# Patient Record
Sex: FEMALE | Race: WHITE | NOT HISPANIC OR LATINO | Employment: FULL TIME | URBAN - METROPOLITAN AREA
[De-identification: names, ages, dates, MRNs, and addresses within clinical notes are randomized per-mention and may not be internally consistent; named-entity substitution may affect disease eponyms.]

---

## 2018-05-19 ENCOUNTER — HOSPITAL ENCOUNTER (EMERGENCY)
Facility: HOSPITAL | Age: 48
Discharge: HOME | End: 2018-05-19
Attending: EMERGENCY MEDICINE | Admitting: EMERGENCY MEDICINE

## 2018-05-19 ENCOUNTER — APPOINTMENT (OUTPATIENT)
Dept: GENERAL RADIOLOGY | Facility: HOSPITAL | Age: 48
End: 2018-05-19

## 2018-05-19 VITALS
HEIGHT: 61 IN | BODY MASS INDEX: 46.26 KG/M2 | TEMPERATURE: 97.7 F | SYSTOLIC BLOOD PRESSURE: 140 MMHG | HEART RATE: 66 BPM | WEIGHT: 245 LBS | RESPIRATION RATE: 18 BRPM | DIASTOLIC BLOOD PRESSURE: 76 MMHG | OXYGEN SATURATION: 98 %

## 2018-05-19 DIAGNOSIS — S93.602A SPRAIN OF LEFT FOOT, INITIAL ENCOUNTER: Primary | ICD-10-CM

## 2018-05-19 PROCEDURE — 99283 EMERGENCY DEPT VISIT LOW MDM: CPT

## 2018-05-19 PROCEDURE — 73630 X-RAY EXAM OF FOOT: CPT

## 2018-05-19 RX ORDER — IRON POLYSACCH/IRON HEME POLYP 28 MG
28 TABLET ORAL DAILY
COMMUNITY
Start: 2018-01-04 | End: 2019-09-17

## 2018-05-19 RX ORDER — FENOFIBRATE 145 MG/1
145 TABLET, COATED ORAL DAILY
COMMUNITY
Start: 2017-12-23 | End: 2019-09-17

## 2018-05-19 RX ORDER — LANOLIN ALCOHOL/MO/W.PET/CERES
1000 CREAM (GRAM) TOPICAL DAILY
Status: ON HOLD | COMMUNITY
Start: 2018-02-04 | End: 2019-11-15

## 2018-05-19 RX ORDER — CETIRIZINE HYDROCHLORIDE 10 MG/1
10 TABLET ORAL DAILY
COMMUNITY

## 2018-05-19 RX ORDER — OMEPRAZOLE 20 MG/1
20 CAPSULE, DELAYED RELEASE ORAL DAILY
COMMUNITY
End: 2019-06-20 | Stop reason: DRUGHIGH

## 2018-05-19 NOTE — ED NOTES
Pt was in Physical therapy for deconditioning. Pt thinks she has plantar fasciitis, but has not been diagnosed. Pt was in physical therapy yesterday after stretching she turned and heard a pop and felt a snap in her left foot. Now she can barely stand on her left foot and it hurts. Pt states it feels like something is crawling up her left foot and leg.      Rafaela Javier RN  05/19/18 [id number]

## 2018-05-19 NOTE — ED PROVIDER NOTES
"EMERGENCY DEPARTMENT ENCOUNTER    Room Number:  48/48  Date seen:  5/19/2018  Time seen: 11:33 AM  PCP: Jodee Wayne MD  Historian: Patient, Family      HPI:  Chief Complaint: Foot Pain   Context: Armin Viramontes is a 48 y.o. female with h/o chronic left foot pain. Pt reports that she is currently in physical therapy for deconditioning. Pt reports that yesterday, while pt was at physical therapy turning her body around to face her physical therapist, pt felt a \"pop\" in her left foot. Since this incident, pt has had pain of the left foot worsening from baseline. Pt states that her pain worsens with movement of the left foot and improves with resting the left foot. Pt states that she has applied ice and elevated the LLE without significant sx relief. Pt denies chest pain, dyspnea, and left calf pain. There are no other complaints at this time.       Location: Left foot   Radiation: None  Severity: Moderate   Duration: Worsening from baseline since yesterday   Onset quality: Abrupt   Associated Symptoms: Pain with ROM of the left foot   Previous treatment(s): Ice/elevating the LLE        PAST MEDICAL HISTORY  Active Ambulatory Problems     Diagnosis Date Noted   • Seasonal allergies    • Hyperlipidemia    • GERD (gastroesophageal reflux disease)      Resolved Ambulatory Problems     Diagnosis Date Noted   • No Resolved Ambulatory Problems     Past Medical History:   Diagnosis Date   • GERD (gastroesophageal reflux disease)    • Hair loss    • HPV in female 2012   • Hyperlipidemia    • Seasonal allergies        PAST SURGICAL HISTORY  Past Surgical History:   Procedure Laterality Date   • ESSURE TUBAL LIGATION     • TONSILLECTOMY         FAMILY HISTORY  Family History   Problem Relation Age of Onset   • Hypertension Mother    • Hyperlipidemia Mother    • Breast cancer Mother 62        DCIS   • Diabetes Father    • Hyperlipidemia Father    • Heart attack Father    • Stroke Father    • Endometriosis Maternal Grandmother "    • Osteoporosis Maternal Grandfather        SOCIAL HISTORY  Social History     Social History   • Marital status: Single     Spouse name: N/A   • Number of children: N/A   • Years of education: N/A     Occupational History   • Director      Social History Main Topics   • Smoking status: Never Smoker   • Smokeless tobacco: Not on file   • Alcohol use Yes      Comment: soc   • Drug use: No   • Sexual activity: Yes     Partners: Male     Birth control/ protection: Surgical     Other Topics Concern   • Not on file     Social History Narrative   • No narrative on file       ALLERGIES  Erythromycin and Vit c-cholecalciferol-joe hip [cholecalciferol-vitamin c]          REVIEW OF SYSTEMS  Review of Systems   Constitutional: Negative.    Eyes: Negative for visual disturbance.   Respiratory: Negative for shortness of breath.    Cardiovascular: Negative for chest pain.   Gastrointestinal: Negative for abdominal pain, nausea and vomiting.   Musculoskeletal: Negative for neck pain.        Left foot pain   Skin: Negative.    Neurological: Negative for syncope, weakness, numbness and headaches.   Psychiatric/Behavioral: Negative.    All other systems reviewed and are negative.            PHYSICAL EXAM  ED Triage Vitals   Temp Heart Rate Resp BP SpO2   05/19/18 1056 05/19/18 1056 05/19/18 1056 05/19/18 1124 05/19/18 1056   97.7 °F (36.5 °C) 66 18 123/67 98 %      Temp src Heart Rate Source Patient Position BP Location FiO2 (%)   05/19/18 1056 05/19/18 1056 05/19/18 1124 05/19/18 1124 --   Tympanic Monitor Sitting Left arm        Physical Exam   Constitutional: She is oriented to person, place, and time and well-developed, well-nourished, and in no distress.   Eyes: EOM are normal.   Neck: Normal range of motion.   Cardiovascular: Normal rate and regular rhythm.    Pulses:       Dorsalis pedis pulses are 2+ on the right side, and 2+ on the left side.        Posterior tibial pulses are 2+ on the right side, and 2+ on the left  side.   Pulmonary/Chest: Effort normal and breath sounds normal. No respiratory distress.   Musculoskeletal: She exhibits tenderness (of the left heel).   No left calf tenderness present. Left Achilles' tendon is nontender.    Neurological: She is alert and oriented to person, place, and time. She has normal sensation and normal strength.   Skin: Skin is warm and dry.   Psychiatric: Affect normal.   Nursing note and vitals reviewed.          RADIOLOGY  XR Foot 3+ View Left     No significant bone or joint abnormality is seen. There is a  very small plantar spur of the calcaneus.           I ordered the above noted radiological studies. Interpreted by radiologist.           PROCEDURES  Procedures            PROGRESS AND CONSULTS  ED Course as of May 19 1329   Sat May 19, 2018   1237 12:37 PM  Patient with foot pain. Achilles nontender and xrays normal.    [SL]      ED Course User Index  [SL] Cordell Whitley MD     11:50 AM:  Left foot X-Ray ordered for further evaluation.     12:30 PM:  Rechecked pt. Informed pt that her left foot X-Ray is negative acute. Pt will be provided with crutches for ambulatory assistance. Pt is requesting Dr. Omalley, orthopedist, for f/u -> pt will be provided with f/u referral information. RTER warnings given. Pt agrees with plan for discharge.         MEDICAL DECISION MAKING      MDM  Number of Diagnoses or Management Options  Sprain of left foot, initial encounter:      Amount and/or Complexity of Data Reviewed  Tests in the radiology section of CPT®: ordered and reviewed (Left foot X-Ray:  No significant bone or joint abnormality is seen. There is a  very small plantar spur of the calcaneus.)    Patient Progress  Patient progress: stable                 DIAGNOSIS  Final diagnoses:   Sprain of left foot, initial encounter               DISPOSITION  Pt discharged.      DISCHARGE    Patient discharged in stable condition.    Reviewed implications of results, diagnosis, meds,  responsibility to follow up, warning signs and symptoms of possible worsening, potential complications and reasons to return to ER.    Patient/Family voiced understanding of above instructions.    Discussed plan for discharge, as there is no emergent indication for admission. Pt/family is agreeable and understands need for follow up and repeat testing. Pt is aware that discharge does not mean that nothing is wrong but it indicates no emergency is present that requires admission and they must continue care with follow-up as given below or physician of their choice.     FOLLOW-UP  Yvonne Omalley MD  [address] [facility] [address]  [place] [id number]  [phone]    Schedule an appointment as soon as possible for a visit in 2 days      Raz Shore MD  [address] [facility] [address]  [facility] [address]  [phone]    Schedule an appointment as soon as possible for a visit in 2 days                  Latest Documented Vital Signs:  As of 1:08 PM  BP- 140/76 HR- 66 Temp- 97.7 °F (36.5 °C) (Tympanic) O2 sat- 98%      --  Documentation assistance provided by ladonna Goldberg for Dr. Angi MD.  Information recorded by the ladonna was done at my direction and has been verified and validated by me.              Kay Goldberg  05/19/18 5204       Cordell Whitley MD  05/19/18 2830

## 2018-11-12 ENCOUNTER — TELEPHONE (OUTPATIENT)
Dept: OBSTETRICS AND GYNECOLOGY | Age: 48
End: 2018-11-12

## 2018-11-12 RX ORDER — FLUCONAZOLE 150 MG/1
150 TABLET ORAL ONCE
Qty: 1 TABLET | Refills: 0 | Status: SHIPPED | OUTPATIENT
Start: 2018-11-12 | End: 2018-11-12

## 2018-11-12 NOTE — TELEPHONE ENCOUNTER
Dr Maldonado pt has tried otc monistat for yeast and is not helping, still exp itch, irritation. Could we send in a Rx to pharm on file, pt states nka. Please advise

## 2019-01-25 ENCOUNTER — PROCEDURE VISIT (OUTPATIENT)
Dept: OBSTETRICS AND GYNECOLOGY | Age: 49
End: 2019-01-25

## 2019-01-25 ENCOUNTER — APPOINTMENT (OUTPATIENT)
Dept: WOMENS IMAGING | Facility: HOSPITAL | Age: 49
End: 2019-01-25

## 2019-01-25 ENCOUNTER — OFFICE VISIT (OUTPATIENT)
Dept: OBSTETRICS AND GYNECOLOGY | Age: 49
End: 2019-01-25

## 2019-01-25 VITALS
SYSTOLIC BLOOD PRESSURE: 124 MMHG | WEIGHT: 256 LBS | HEIGHT: 61 IN | BODY MASS INDEX: 48.33 KG/M2 | DIASTOLIC BLOOD PRESSURE: 76 MMHG

## 2019-01-25 DIAGNOSIS — Z01.419 ENCOUNTER FOR GYNECOLOGICAL EXAMINATION WITHOUT ABNORMAL FINDING: ICD-10-CM

## 2019-01-25 DIAGNOSIS — Z12.4 ROUTINE CERVICAL SMEAR: ICD-10-CM

## 2019-01-25 DIAGNOSIS — Z12.31 VISIT FOR SCREENING MAMMOGRAM: Primary | ICD-10-CM

## 2019-01-25 DIAGNOSIS — Z11.51 SPECIAL SCREENING EXAMINATION FOR HUMAN PAPILLOMAVIRUS (HPV): ICD-10-CM

## 2019-01-25 PROCEDURE — 77067 SCR MAMMO BI INCL CAD: CPT | Performed by: OBSTETRICS & GYNECOLOGY

## 2019-01-25 PROCEDURE — 77067 SCR MAMMO BI INCL CAD: CPT | Performed by: RADIOLOGY

## 2019-01-25 PROCEDURE — 99396 PREV VISIT EST AGE 40-64: CPT | Performed by: OBSTETRICS & GYNECOLOGY

## 2019-01-25 NOTE — PROGRESS NOTES
Subjective     Chief Complaint   Patient presents with   • Gynecologic Exam     AC       History of Present Illness    Armin Viramontes is a 48 y.o.  who presents for annual exam.  The patient has not been here for a few years.  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    She has had a difficult time with trying to exercise.  She has plantar fasciitis and has tried physical therapy.  She also had a workup for shortness of breath with just talking.  Cardiac and pulmonary workup for negative.  She is interested in possible bariatric treatment since her weight prevents her from being able to exercise comfortably and exacerbates the plantar fasciitis    Her menses are regular every 28-30 days, lasting 3 days, dysmenorrhea mild, occurring first 1-2 days of flow   Obstetric History:  OB History      Para Term  AB Living    0 0 0 0 0 0    SAB TAB Ectopic Molar Multiple Live Births    0 0 0   0           Menstrual History:     Patient's last menstrual period was 2019.         Current contraception: essure  History of abnormal Pap smear: yes - HPV  Received Gardasil immunization: no  Perform regular self breast exam: no  Family history of uterine or ovarian cancer: yes - MGM cervical? 50 pt  to ask her mother.  Family History of colon cancer: no  Family history of breast cancer: yes - MOM 61    Mammogram: done today.  Colonoscopy: recommended.  DEXA: not indicated.    Exercise: not active  Calcium/Vitamin D: inadequate intake    The following portions of the patient's history were reviewed and updated as appropriate: allergies, current medications, past family history, past medical history, past social history, past surgical history and problem list.    Review of Systems    Review of Systems   Constitutional: Negative for fatigue.   Respiratory: Negative for shortness of breath.    Gastrointestinal: Negative for abdominal pain.   Genitourinary: Negative for dysuria.   Neurological: Negative for  "headaches.   Psychiatric/Behavioral: Negative for dysphoric mood.         Objective   Physical Exam    /76   Ht 154.9 cm (61\")   Wt 116 kg (256 lb)   LMP 01/07/2019   BMI 48.37 kg/m²     General:   alert, appears stated age and cooperative   Neck: no adenopathy and thyroid normal to palpation   Heart: regular rate and rhythm   Lungs: clear to auscultation bilaterally   Abdomen: soft, non-tender, without masses or organomegaly   Breast: inspection negative, no nipple discharge or bleeding, no masses or nodularity palpable   Vulva: normal, Bartholin's, Urethra, [place]'s normal   Vagina: normal mucosa, normal discharge   Cervix: no cervical motion tenderness and no lesions   Uterus: mobile, non-tender, normal shape and consistency   Adnexa: no mass, fullness, tenderness   Rectal: normal rectal, no masses     Assessment/Plan   Armin was seen today for gynecologic exam.    Diagnoses and all orders for this visit:    BMI 45.0-49.9, adult (CMS/[facility])  -     Ambulatory Referral to Bariatric Surgery    Encounter for gynecological examination without abnormal finding  -     Ambulatory Referral For Screening Colonoscopy  -     IGP, Aptima HPV, Rfx 16 / 18,45    Routine cervical smear  -     IGP, Aptima HPV, Rfx 16 / 18,45    Special screening examination for human papillomavirus (HPV)  -     IGP, Aptima HPV, Rfx 16 / 18,45      I think referral to bariatric surgery is a good idea for significant weight loss.    I will also refer the patient to Dr. Perez.  She has had some issues with diarrhea and will need to scroll screening colonoscopy since she is over the age of 47.  Pap smear was sent today.  Patient has a history of HPV.  Patient will check with her mother as to what type of GYN cancer runs in the family.  We discussed if it is ovarian cancer she would be candidate for genetic testing.  All questions answered.  Breast self exam technique reviewed and patient encouraged to perform self-exam monthly.  Discussed " healthy lifestyle modifications.  Recommended 30 minutes of aerobic exercise five times per week.  Discussed calcium needs to prevent osteoporosis.

## 2019-01-29 ENCOUNTER — TELEPHONE (OUTPATIENT)
Dept: OBSTETRICS AND GYNECOLOGY | Age: 49
End: 2019-01-29

## 2019-01-29 DIAGNOSIS — R92.2 INCONCLUSIVE MAMMOGRAM: Primary | ICD-10-CM

## 2019-01-29 PROBLEM — Z80.41 FAMILY HISTORY OF OVARIAN CANCER: Status: ACTIVE | Noted: 2019-01-29

## 2019-01-29 LAB
CYTOLOGIST CVX/VAG CYTO: NORMAL
CYTOLOGY CVX/VAG DOC THIN PREP: NORMAL
DX ICD CODE: NORMAL
HIV 1 & 2 AB SER-IMP: NORMAL
HPV I/H RISK 4 DNA CVX QL PROBE+SIG AMP: NEGATIVE
OTHER STN SPEC: NORMAL
PATH REPORT.FINAL DX SPEC: NORMAL
STAT OF ADQ CVX/VAG CYTO-IMP: NORMAL

## 2019-02-05 ENCOUNTER — DOCUMENTATION (OUTPATIENT)
Dept: OBSTETRICS AND GYNECOLOGY | Age: 49
End: 2019-02-05

## 2019-02-05 ENCOUNTER — APPOINTMENT (OUTPATIENT)
Dept: WOMENS IMAGING | Facility: HOSPITAL | Age: 49
End: 2019-02-05

## 2019-02-05 PROCEDURE — 76641 ULTRASOUND BREAST COMPLETE: CPT | Performed by: RADIOLOGY

## 2019-02-08 DIAGNOSIS — R59.9 LYMPH NODE ENLARGEMENT: Primary | ICD-10-CM

## 2019-02-11 ENCOUNTER — APPOINTMENT (OUTPATIENT)
Dept: WOMENS IMAGING | Facility: HOSPITAL | Age: 49
End: 2019-02-11

## 2019-02-11 PROCEDURE — 76942 ECHO GUIDE FOR BIOPSY: CPT | Performed by: RADIOLOGY

## 2019-02-11 PROCEDURE — 38505 NEEDLE BIOPSY LYMPH NODES: CPT | Performed by: RADIOLOGY

## 2019-02-14 DIAGNOSIS — R59.0 LYMPHADENOPATHY, AXILLARY: Primary | ICD-10-CM

## 2019-02-19 ENCOUNTER — TELEPHONE (OUTPATIENT)
Dept: OBSTETRICS AND GYNECOLOGY | Age: 49
End: 2019-02-19

## 2019-02-19 NOTE — TELEPHONE ENCOUNTER
----- Message from Curtis Rai MD sent at 2/19/2019  7:44 AM EST -----  Please make sure the patient knows to schedule a 3 and 6-month recheck [facility]  I have also ordered an oncology consult.  The office should be calling her to set that up.

## 2019-02-20 ENCOUNTER — TELEPHONE (OUTPATIENT)
Dept: OBSTETRICS AND GYNECOLOGY | Age: 49
End: 2019-02-20

## 2019-02-27 ENCOUNTER — TELEPHONE (OUTPATIENT)
Dept: OBSTETRICS AND GYNECOLOGY | Age: 49
End: 2019-02-27

## 2019-02-28 ENCOUNTER — APPOINTMENT (OUTPATIENT)
Dept: LAB | Facility: HOSPITAL | Age: 49
End: 2019-02-28

## 2019-02-28 ENCOUNTER — APPOINTMENT (OUTPATIENT)
Dept: ONCOLOGY | Facility: CLINIC | Age: 49
End: 2019-02-28

## 2019-03-18 RX ORDER — FLUCONAZOLE 150 MG/1
150 TABLET ORAL DAILY
Qty: 1 TABLET | Refills: 0 | Status: SHIPPED | OUTPATIENT
Start: 2019-03-18 | End: 2019-03-22 | Stop reason: SDUPTHER

## 2019-03-18 NOTE — TELEPHONE ENCOUNTER
Dr Rai pt requesting a prescription for yeast be sent to her verified pharmacy on file as pt just finished an antibiotic for both a toe fungus & a upper respiratory infection. Pt reports vaginal itching, burning & irritation.

## 2019-03-22 RX ORDER — FLUCONAZOLE 150 MG/1
150 TABLET ORAL DAILY
Qty: 1 TABLET | Refills: 0 | Status: SHIPPED | OUTPATIENT
Start: 2019-03-22 | End: 2019-10-31

## 2019-03-22 NOTE — TELEPHONE ENCOUNTER
Dr Maldonado pt was given 1 Diflucan 2-3 days ago and had not noticed much improvement, still exp itch, irritation but has been on abx. Pt requesting we send in 1 more Diflucan to her pharm on file, allergic to erythromycin. Please advise

## 2019-05-16 ENCOUNTER — TELEPHONE (OUTPATIENT)
Dept: OBSTETRICS AND GYNECOLOGY | Age: 49
End: 2019-05-16

## 2019-05-16 NOTE — TELEPHONE ENCOUNTER
Dr Rai pt needs a second letter stating that she tried to lose weight on her own. Pt states the Bariatric center needs two letters. Please fax to the [facility], [facility] [phone]. Please Advise

## 2019-05-16 NOTE — TELEPHONE ENCOUNTER
Please produce letter that states the patient has been trying to lose weight on her own and I will sign.

## 2019-05-29 ENCOUNTER — TELEPHONE (OUTPATIENT)
Dept: OBSTETRICS AND GYNECOLOGY | Age: 49
End: 2019-05-29

## 2019-06-07 ENCOUNTER — APPOINTMENT (OUTPATIENT)
Dept: ONCOLOGY | Facility: CLINIC | Age: 49
End: 2019-06-07

## 2019-06-10 ENCOUNTER — TELEPHONE (OUTPATIENT)
Dept: OBSTETRICS AND GYNECOLOGY | Age: 49
End: 2019-06-10

## 2019-06-10 NOTE — TELEPHONE ENCOUNTER
Dr Rai pt states Bariatric surgery needs another note on file. Pt states 01/25/2019 is the first note. Pt just needs another note stating that Dr Rai and pt talked about her weight, that's not from 01/2019. Please Advise

## 2019-06-18 ENCOUNTER — APPOINTMENT (OUTPATIENT)
Dept: WOMENS IMAGING | Facility: HOSPITAL | Age: 49
End: 2019-06-18

## 2019-06-18 PROCEDURE — 76641 ULTRASOUND BREAST COMPLETE: CPT | Performed by: RADIOLOGY

## 2019-06-19 NOTE — PROGRESS NOTES
Subjective     REASON FOR CONSULTATION: Lymphadenopathy, anemia  Provide an opinion on any further workup or treatment                             REQUESTING PHYSICIAN: Jodee Wayne MD    RECORDS OBTAINED:  Records of the patients history including those obtained from the referring provider were reviewed and summarized in detail.    HISTORY OF PRESENT ILLNESS:  The patient is a 49 y.o. year old female who is here for an opinion about the above issue.    History of Present Illness ;  The patient is a 49-year-old female followed with a history of hyperlipidemia, GE reflux and seasonal allergies.  She also follows with her gynecologist routinely and been seen January 25 with concerns about her weight and a referral to bariatric surgery was pursued as well as screening colonoscopy.  She has a history of HPV and evidently a family history of ovarian malignancy.    The patient had undergone routine mammography January 28 revealing scattered areas of fibroglandular density and new large action lymph nodes in both axillae right greater than left.  Further ultrasound was performed February 7 demonstrating abnormal axillary lymph nodes correlating with mammographic findings both axillae.  Biopsy was performed February 18, 2019- Pathology reveals benign lymphoid tissue.  Flow cytometry also revealed no significant lymphoid immunophenotypic abnormalities with a histologic features and findings of flow cytometry and immunohistochemistry consistent with benign tissue.Additional testing in February include H&H of 10.4 and 33.5 with white count 13,800, platelet count 3 74,000 with a normal differential.    We are asked to see the patient for her anemia as well as her lymphadenopathy.  She provides a history that includes a number of symptoms including night sweats, excessive fatigue, periodic rash, shortness of breath with modest activity, periodic ankle swelling, frequency of urination and a general reduction of stamina when  usually she is quite active and energetic.    Past Medical History:   Diagnosis Date   • Anemia    • GERD (gastroesophageal reflux disease)    • Hair loss    • History of obesity    • HPV in female 2012   • Hyperlipidemia    • Lymphadenopathy 02/2019    Right axilla   • JAZMYN (obstructive sleep apnea)    • Patient exposure to body fluids    • Seasonal allergies         Past Surgical History:   Procedure Laterality Date   • ESSURE TUBAL LIGATION     • TONSILLECTOMY          Current Outpatient Medications on File Prior to Visit   Medication Sig Dispense Refill   • Biotin 10 MG tablet Take  by mouth.     • CBD (cannabidiol) oral oil Take  by mouth.     • cetirizine (zyrTEC) 10 MG tablet Take 10 mg by mouth Daily.     • Cholecalciferol (VITAMIN D3) 5000 units capsule capsule Take 5,000 Units by mouth Daily.     • fenofibrate (TRICOR) 145 MG tablet Take 145 mg by mouth Daily.     • fluconazole (DIFLUCAN) 150 MG tablet Take 1 tablet by mouth Daily. 1 tablet 0   • Multiple Vitamins-Minerals (MULTIVITAMIN ADULT PO) Take  by mouth.     • omeprazole (priLOSEC) 40 MG capsule      • Polysacch Fe Cmp-Fe Heme Poly (FEOSOL BIFERA) 28 MG tablet Take 28 mg by mouth Daily.     • Probiotic Product (PROBIOTIC-10 PO) Take  by mouth.     • terbinafine (lamiSIL) 250 MG tablet Take 250 mg by mouth Daily.     • TURMERIC PO Take  by mouth.     • vitamin B-12 (CYANOCOBALAMIN) 1000 MCG tablet Take 1,000 mcg by mouth Daily.     • [DISCONTINUED] omeprazole (priLOSEC) 20 MG capsule Take 20 mg by mouth Daily.       No current facility-administered medications on file prior to visit.         ALLERGIES:    Allergies   Allergen Reactions   • Erythromycin    • Vit C-Cholecalciferol-Rochelle Hip [Cholecalciferol-Vitamin C]         Social History     Socioeconomic History   • Marital status: Single     Spouse name: Not on file   • Number of children: Not on file   • Years of education: Not on file   • Highest education level: Not on file   Occupational  "History   • Occupation: Director     Employer: [employer]   Tobacco Use   • Smoking status: Never Smoker   • Smokeless tobacco: Never Used   Substance and Sexual Activity   • Alcohol use: Yes     Frequency: Monthly or less     Comment: social   • Drug use: No   • Sexual activity: Not Currently     Partners: Male     Birth control/protection: Surgical        Family History   Problem Relation Age of Onset   • Hypertension Mother    • Hyperlipidemia Mother    • Breast cancer Mother 62        DCIS   • Thyroid disease Mother    • Diabetes Father    • Hyperlipidemia Father    • Heart attack Father    • Stroke Father    • Heart disease Father         CHF   • Obesity Father    • Endometriosis Maternal Grandmother    • Ovarian cancer Maternal Grandmother 49        gyn ? ovarian   • Osteoporosis Maternal Grandfather         Review of Systems   Constitutional: Positive for activity change, chills, diaphoresis, fatigue and unexpected weight change.   Respiratory: Positive for chest tightness and shortness of breath.    Cardiovascular: Positive for palpitations and leg swelling.   Musculoskeletal: Positive for arthralgias.   Neurological: Positive for dizziness.        Objective     Vitals:    06/20/19 1320   BP: 164/92   Pulse: 69   Resp: 18   Temp: 97.6 °F (36.4 °C)   TempSrc: Oral   SpO2: 99%   Weight: 120 kg (263 lb 9.6 oz)   Height: 154.9 cm (60.98\")  Comment: new patient height   PainSc: 0-No pain     Current Status 6/20/2019   ECOG score 0       Physical Exam   Constitutional: She is oriented to person, place, and time. She appears well-developed and well-nourished.   Morbidly obese  female   HENT:   Head: Normocephalic and atraumatic.   Nose: Nose normal.   Eyes: EOM are normal. Pupils are equal, round, and reactive to light.   Neck: Normal range of motion. Neck supple.   No lymphadenopathy noted in cervical, infraclavicular, supraclavicular, axillary or groin regions.   Cardiovascular: Normal rate, regular " rhythm, normal heart sounds and intact distal pulses.   Pulmonary/Chest: Effort normal and breath sounds normal.   Abdominal: Soft. Bowel sounds are normal.   Musculoskeletal: Normal range of motion.   Lymphadenopathy:     She has no cervical adenopathy.   Neurological: She is alert and oriented to person, place, and time.   Skin: Skin is warm and dry.   Psychiatric: She has a normal mood and affect. Her behavior is normal. Thought content normal.         RECENT LABS:  Hematology WBC   Date Value Ref Range Status   06/20/2019 12.24 (H) 3.40 - 10.80 10*3/mm3 Final     RBC   Date Value Ref Range Status   06/20/2019 4.56 3.77 - 5.28 10*6/mm3 Final     Hemoglobin   Date Value Ref Range Status   06/20/2019 11.2 (L) 12.0 - 15.9 g/dL Final     Hematocrit   Date Value Ref Range Status   06/20/2019 35.6 34.0 - 46.6 % Final     Platelets   Date Value Ref Range Status   06/20/2019 187 140 - 450 10*3/mm3 Final          Assessment/Plan        49-year-old female with a history of hyperlipidemia, GE reflux, seasonal allergies and morbid obesity recently accessing potential bariatric surgery.  She had undergone routine mammography in late January showing new large lymphadenopathy in both axillae, confirmed on ultrasound and eventual needle biopsy February 18, 2019 showing benign lymphoid tissue.  Approximately at the same point the patient was developing significant anemia with February 18 assessment including H&H of 10.4 and 33.5 MCV not 77.2, MCH of 24.0, normal platelet count normal platelet count, and mild leukocytosis with normal differential.  The patient states that she was placed on oral iron but does not take it routinely and, indeed, her hemoglobin hematocrit are mildly improved.  Her symptoms however described as above are significant with excessive fatigue upon minimal activity, night sweats, temperature intolerance and shortness of breath again with minimal activity.     We have discussed an assessment to include not  only her degree of anemia but also asked whether it is potentially associated with further lymphadenopathy at this point.  Finally she also has symptoms that could also include perimenopause.  As a result plan additional laboratory studies with uric acid, sed rate, LDH, LH, FSH, estradiol, MICHELLE, PE and free light chains, iron profile, ferritin, flow cytometry peripheral blood and CT of chest abdomen pelvis.  The studies to be done next several weeks the patient seen back approximately 3 to 4 weeks from now reviewing her status once again.  She may need a node removal rather than a repeat needle biopsy considering diagnostic yield.  She is aware of this is a possibility.  Thanks for allowing us to see this patient consultation please let me know if any question concerning this case.

## 2019-06-20 ENCOUNTER — CONSULT (OUTPATIENT)
Dept: ONCOLOGY | Facility: CLINIC | Age: 49
End: 2019-06-20

## 2019-06-20 ENCOUNTER — TELEPHONE (OUTPATIENT)
Dept: ONCOLOGY | Facility: HOSPITAL | Age: 49
End: 2019-06-20

## 2019-06-20 ENCOUNTER — LAB (OUTPATIENT)
Dept: LAB | Facility: HOSPITAL | Age: 49
End: 2019-06-20

## 2019-06-20 VITALS
RESPIRATION RATE: 18 BRPM | HEIGHT: 61 IN | BODY MASS INDEX: 49.77 KG/M2 | WEIGHT: 263.6 LBS | TEMPERATURE: 97.6 F | HEART RATE: 69 BPM | OXYGEN SATURATION: 99 % | SYSTOLIC BLOOD PRESSURE: 164 MMHG | DIASTOLIC BLOOD PRESSURE: 92 MMHG

## 2019-06-20 DIAGNOSIS — R59.1 LYMPHADENOPATHY: Primary | ICD-10-CM

## 2019-06-20 DIAGNOSIS — D64.9 ANEMIA, UNSPECIFIED TYPE: ICD-10-CM

## 2019-06-20 DIAGNOSIS — R59.1 LYMPHADENOPATHY: ICD-10-CM

## 2019-06-20 DIAGNOSIS — R59.0 AXILLARY LYMPHADENOPATHY: Primary | ICD-10-CM

## 2019-06-20 LAB
BASOPHILS # BLD AUTO: 0.08 10*3/MM3 (ref 0–0.2)
BASOPHILS NFR BLD AUTO: 0.7 % (ref 0–1.5)
DEPRECATED RDW RBC AUTO: 45.3 FL (ref 37–54)
EOSINOPHIL # BLD AUTO: 0.61 10*3/MM3 (ref 0–0.4)
EOSINOPHIL NFR BLD AUTO: 5 % (ref 0.3–6.2)
ERYTHROCYTE [DISTWIDTH] IN BLOOD BY AUTOMATED COUNT: 15.9 % (ref 12.3–15.4)
ERYTHROCYTE [SEDIMENTATION RATE] IN BLOOD: 45 MM/HR (ref 0–20)
ESTRADIOL SERPL HS-MCNC: 41.2 PG/ML
FERRITIN SERPL-MCNC: 40.9 NG/ML (ref 11–207)
FSH SERPL-ACNC: 12.4 MIU/ML
HCT VFR BLD AUTO: 35.6 % (ref 34–46.6)
HGB BLD-MCNC: 11.2 G/DL (ref 12–15.9)
IMM GRANULOCYTES # BLD AUTO: 0.14 10*3/MM3 (ref 0–0.05)
IMM GRANULOCYTES NFR BLD AUTO: 1.1 % (ref 0–0.5)
IRON 24H UR-MRATE: 27 MCG/DL (ref 37–145)
IRON SATN MFR SERPL: 8 % (ref 14–48)
LDH SERPL-CCNC: 180 U/L (ref 99–259)
LH SERPL-ACNC: 9.27 MIU/ML
LYMPHOCYTES # BLD AUTO: 3.61 10*3/MM3 (ref 0.7–3.1)
LYMPHOCYTES NFR BLD AUTO: 29.5 % (ref 19.6–45.3)
MCH RBC QN AUTO: 24.6 PG (ref 26.6–33)
MCHC RBC AUTO-ENTMCNC: 31.5 G/DL (ref 31.5–35.7)
MCV RBC AUTO: 78.1 FL (ref 79–97)
MONOCYTES # BLD AUTO: 0.52 10*3/MM3 (ref 0.1–0.9)
MONOCYTES NFR BLD AUTO: 4.2 % (ref 5–12)
NEUTROPHILS # BLD AUTO: 7.28 10*3/MM3 (ref 1.7–7)
NEUTROPHILS NFR BLD AUTO: 59.5 % (ref 42.7–76)
NRBC BLD AUTO-RTO: 0.2 /100 WBC (ref 0–0.2)
PLATELET # BLD AUTO: 187 10*3/MM3 (ref 140–450)
PMV BLD AUTO: 10.4 FL (ref 6–12)
RBC # BLD AUTO: 4.56 10*6/MM3 (ref 3.77–5.28)
TIBC SERPL-MCNC: 340 MCG/DL (ref 249–505)
TRANSFERRIN SERPL-MCNC: 243 MG/DL (ref 200–360)
URATE SERPL-MCNC: 6.5 MG/DL (ref 2.8–7.4)
WBC NRBC COR # BLD: 12.24 10*3/MM3 (ref 3.4–10.8)

## 2019-06-20 PROCEDURE — 85025 COMPLETE CBC W/AUTO DIFF WBC: CPT | Performed by: INTERNAL MEDICINE

## 2019-06-20 PROCEDURE — 99244 OFF/OP CNSLTJ NEW/EST MOD 40: CPT | Performed by: INTERNAL MEDICINE

## 2019-06-20 PROCEDURE — 83615 LACTATE (LD) (LDH) ENZYME: CPT | Performed by: INTERNAL MEDICINE

## 2019-06-20 PROCEDURE — 36415 COLL VENOUS BLD VENIPUNCTURE: CPT | Performed by: INTERNAL MEDICINE

## 2019-06-20 PROCEDURE — 83001 ASSAY OF GONADOTROPIN (FSH): CPT | Performed by: INTERNAL MEDICINE

## 2019-06-20 PROCEDURE — 83540 ASSAY OF IRON: CPT | Performed by: INTERNAL MEDICINE

## 2019-06-20 PROCEDURE — 82728 ASSAY OF FERRITIN: CPT | Performed by: INTERNAL MEDICINE

## 2019-06-20 PROCEDURE — 83002 ASSAY OF GONADOTROPIN (LH): CPT | Performed by: INTERNAL MEDICINE

## 2019-06-20 PROCEDURE — 84550 ASSAY OF BLOOD/URIC ACID: CPT | Performed by: INTERNAL MEDICINE

## 2019-06-20 PROCEDURE — 84466 ASSAY OF TRANSFERRIN: CPT | Performed by: INTERNAL MEDICINE

## 2019-06-20 PROCEDURE — 85652 RBC SED RATE AUTOMATED: CPT | Performed by: INTERNAL MEDICINE

## 2019-06-20 PROCEDURE — 82670 ASSAY OF TOTAL ESTRADIOL: CPT | Performed by: INTERNAL MEDICINE

## 2019-06-20 RX ORDER — TERBINAFINE HYDROCHLORIDE 250 MG/1
250 TABLET ORAL DAILY
COMMUNITY
End: 2019-07-11

## 2019-06-20 RX ORDER — OMEPRAZOLE 40 MG/1
40 CAPSULE, DELAYED RELEASE ORAL DAILY
COMMUNITY
Start: 2019-04-14 | End: 2020-09-24 | Stop reason: SDUPTHER

## 2019-06-20 NOTE — TELEPHONE ENCOUNTER
----- Message from Sahara Bowman sent at 6/20/2019  3:56 PM EDT -----  [phone]   Has another question for Dr. Perera she forgot to ask him about the pulmonologist appt. She already has.    Attempted to call pt. No answer. Left     Called and spoke with pt. She states she meant to ask Dr. Perera if she should f/u with pulm, or wait until testing comes back that he ordered. Send Trever a message.

## 2019-06-21 LAB
ALBUMIN SERPL-MCNC: 3.4 G/DL (ref 2.9–4.4)
ALBUMIN/GLOB SERPL: 1 {RATIO} (ref 0.7–1.7)
ALPHA1 GLOB FLD ELPH-MCNC: 0.3 G/DL (ref 0–0.4)
ALPHA2 GLOB SERPL ELPH-MCNC: 1.1 G/DL (ref 0.4–1)
B-GLOBULIN SERPL ELPH-MCNC: 1.3 G/DL (ref 0.7–1.3)
GAMMA GLOB SERPL ELPH-MCNC: 1 G/DL (ref 0.4–1.8)
GLOBULIN SER CALC-MCNC: 3.6 G/DL (ref 2.2–3.9)
IGA SERPL-MCNC: 168 MG/DL (ref 87–352)
IGG SERPL-MCNC: 932 MG/DL (ref 700–1600)
IGM SERPL-MCNC: 70 MG/DL (ref 26–217)
INTERPRETATION SERPL IEP-IMP: ABNORMAL
KAPPA LC SERPL-MCNC: 15.9 MG/L (ref 3.3–19.4)
KAPPA LC/LAMBDA SER: 1.14 {RATIO} (ref 0.26–1.65)
LAMBDA LC FREE SERPL-MCNC: 14 MG/L (ref 5.7–26.3)
Lab: ABNORMAL
M-SPIKE: ABNORMAL G/DL
PROT SERPL-MCNC: 7 G/DL (ref 6–8.5)
REF LAB TEST METHOD: NORMAL

## 2019-07-01 ENCOUNTER — TELEPHONE (OUTPATIENT)
Dept: ONCOLOGY | Facility: HOSPITAL | Age: 49
End: 2019-07-01

## 2019-07-01 NOTE — TELEPHONE ENCOUNTER
This message received after her pulm appt.     ----- Message from Cristina Pollock RN sent at 7/1/2019  7:58 AM EDT -----      ----- Message -----  From: Raz Perera MD  Sent: 6/23/2019   9:26 PM  To: Cristina Pollock RN    She should proceed with the appointment with the pulmonologist.ThanksYOLANDA  ----- Message -----  From: Cristina Pollock RN  Sent: 6/20/2019   4:13 PM  To: Raz Perera MD    Pt forgot to ask you, she has an appt with pulmonologist on 6/25, should she continue with that appt or wait until she has more answers from the testing you are doing?    Thanks Cristina

## 2019-07-03 ENCOUNTER — TELEPHONE (OUTPATIENT)
Dept: OBSTETRICS AND GYNECOLOGY | Age: 49
End: 2019-07-03

## 2019-07-03 NOTE — TELEPHONE ENCOUNTER
----- Message from Curtis Rai MD sent at 7/3/2019  9:45 AM EDT -----  Please notify that bilateral breast ultrasound looks good.  The lymph nodes are stable in size.  There are more prominent lymph nodes on the right.  Recommendation is for mammogram in 6 months with bilateral repeat ultrasound of the breast and axilla.

## 2019-07-05 ENCOUNTER — HOSPITAL ENCOUNTER (OUTPATIENT)
Dept: PET IMAGING | Facility: HOSPITAL | Age: 49
Discharge: HOME | End: 2019-07-05
Admitting: INTERNAL MEDICINE

## 2019-07-05 DIAGNOSIS — R59.1 LYMPHADENOPATHY: ICD-10-CM

## 2019-07-05 LAB — CREAT BLDA-MCNC: 0.7 MG/DL (ref 0.6–1.3)

## 2019-07-05 PROCEDURE — 0 DIATRIZOATE MEGLUMINE & SODIUM PER 1 ML: Performed by: INTERNAL MEDICINE

## 2019-07-05 PROCEDURE — 71260 CT THORAX DX C+: CPT

## 2019-07-05 PROCEDURE — 25010000002 IOPAMIDOL 61 % SOLUTION: Performed by: INTERNAL MEDICINE

## 2019-07-05 PROCEDURE — 82565 ASSAY OF CREATININE: CPT

## 2019-07-05 PROCEDURE — 74177 CT ABD & PELVIS W/CONTRAST: CPT

## 2019-07-05 RX ADMIN — IOPAMIDOL 85 ML: 612 INJECTION, SOLUTION INTRAVENOUS at 14:33

## 2019-07-05 RX ADMIN — DIATRIZOATE MEGLUMINE AND DIATRIZOATE SODIUM 30 ML: 660; 100 LIQUID ORAL; RECTAL at 13:45

## 2019-07-05 NOTE — PROGRESS NOTES
Subjective No change physically though the patient indicates she is undergoing bariatric assessment for possible gastric surgery, also describes recent follow-up with women's [facility] and unchanged actually adenopathy on ultrasound.    REASON FOR CONSULTATION: Lymphadenopathy, anemia          REQUESTING PHYSICIAN: Raz Floyd MD, Curtis Rai MD    History of Present Illness ;  The patient is a 49-year-old female followed with a history of hyperlipidemia, GE reflux and seasonal allergies.  She also follows with her gynecologist routinely and been seen January 25 with concerns about her weight and a referral to bariatric surgery was pursued as well as screening colonoscopy.  She has a history of HPV and evidently a family history of ovarian malignancy.    The patient had undergone routine mammography January 28 revealing scattered areas of fibroglandular density and new large action lymph nodes in both axillae right greater than left.  Further ultrasound was performed February 7 demonstrating abnormal axillary lymph nodes correlating with mammographic findings both axillae.  Biopsy was performed February 18, 2019- Pathology reveals benign lymphoid tissue.  Flow cytometry also revealed no significant lymphoid immunophenotypic abnormalities with a histologic features and findings of flow cytometry and immunohistochemistry consistent with benign tissue.Additional testing in February include H&H of 10.4 and 33.5 with white count 13,800, platelet count 3 74,000 with a normal differential.    We are asked to see the patient for her anemia as well as her lymphadenopathy.  She provides a history that includes a number of symptoms including night sweats, excessive fatigue, periodic rash, shortness of breath with modest activity, periodic ankle swelling, frequency of urination and a general reduction of stamina when usually she is quite active and energetic.     The patient subsequent studies included a normal ferritin  though evidence of reduced iron saturation, normal LDH, electrophoresis without evidence of monoclonality, normal uric acid, mildly elevated sed rate to 45, FSH of 12.4, LH of 9.27,Estradiol of 41.2, normal LDH, negative evidence for flow cytometry for monoclonal population and CT studies of the chest and abdomen revealing shotty axillary nodes as well as to additionally enlarged right axillary nodes and a borderline enlarged left axillary node.  The largest left axillary is 2.0 x 0.9.  There is no mediastinal or hilar adenopathy or abdominal findings.  As the patient's reexamined and interviewed July 11 she feels about the same still with a degree of fatigue.  She had been reevaluated by women's [facility] with repeat axillary ultrasound showing no change for the findings and we have discussed the above findings which again support bilateral axillary adenopathy but no other findings.  She is additionally iron deficient and intolerant to oral iron with nausea and constipation.  She indicates she is actually been iron deficient for many years attempting oral iron in her teens.  We have also discussed potential endoscopy and she indicates that she is going to be seen bariatric surgery in the near future to manage her weight.    Past Medical History:   Diagnosis Date   • Anemia    • Arthritis    • GERD (gastroesophageal reflux disease)    • Hair loss    • History of obesity    • HPV in female 2012   • Hyperlipidemia    • Infectious mononucleosis    • Lymphadenopathy 02/2019    Right axilla   • JAZMYN (obstructive sleep apnea)    • Patient exposure to body fluids    • Seasonal allergies         Past Surgical History:   Procedure Laterality Date   • ESSURE TUBAL LIGATION     • TONSILLECTOMY          Current Outpatient Medications on File Prior to Visit   Medication Sig Dispense Refill   • Biotin 10 MG tablet Take  by mouth.     • CBD (cannabidiol) oral oil Take  by mouth.     • cetirizine (zyrTEC) 10 MG tablet Take 10 mg by  mouth Daily.     • Cholecalciferol (VITAMIN D3) 5000 units capsule capsule Take 5,000 Units by mouth Daily.     • fenofibrate (TRICOR) 145 MG tablet Take 145 mg by mouth Daily.     • fluconazole (DIFLUCAN) 150 MG tablet Take 1 tablet by mouth Daily. 1 tablet 0   • Multiple Vitamins-Minerals (MULTIVITAMIN ADULT PO) Take  by mouth.     • omeprazole (priLOSEC) 40 MG capsule      • Polysacch Fe Cmp-Fe Heme Poly (FEOSOL BIFERA) 28 MG tablet Take 28 mg by mouth Daily.     • Probiotic Product (PROBIOTIC-10 PO) Take  by mouth.     • TURMERIC PO Take  by mouth.     • vitamin B-12 (CYANOCOBALAMIN) 1000 MCG tablet Take 1,000 mcg by mouth Daily.     • [DISCONTINUED] terbinafine (lamiSIL) 250 MG tablet Take 250 mg by mouth Daily.       No current facility-administered medications on file prior to visit.         ALLERGIES:    Allergies   Allergen Reactions   • Erythromycin    • Vit C-Cholecalciferol-Rochelle Hip [Cholecalciferol-Vitamin C]         Social History     Socioeconomic History   • Marital status: Single     Spouse name: Not on file   • Number of children: Not on file   • Years of education: College   • Highest education level: Not on file   Occupational History   • Occupation: Director     Employer: [employer]   Tobacco Use   • Smoking status: Never Smoker   • Smokeless tobacco: Never Used   Substance and Sexual Activity   • Alcohol use: Yes     Frequency: Monthly or less     Comment: social   • Drug use: No   • Sexual activity: Not Currently     Partners: Male     Birth control/protection: Surgical        Family History   Problem Relation Age of Onset   • Hypertension Mother    • Hyperlipidemia Mother    • Breast cancer Mother 62        DCIS   • Thyroid disease Mother    • Diabetes Father    • Hyperlipidemia Father    • Heart attack Father    • Stroke Father    • Heart disease Father         CHF   • Obesity Father    • Endometriosis Maternal Grandmother    • Ovarian cancer Maternal Grandmother 49        gyn ? ovarian  "  • Osteoporosis Maternal Grandfather         Review of Systems   Constitutional: Positive for activity change, chills, diaphoresis and unexpected weight change. Negative for fatigue.   Respiratory: Positive for shortness of breath. Negative for chest tightness.    Cardiovascular: Positive for palpitations and leg swelling.   Gastrointestinal: Positive for nausea. Negative for abdominal pain, constipation, diarrhea and vomiting.   Musculoskeletal: Positive for arthralgias.   Neurological: Negative for dizziness and weakness.        Objective     Vitals:    07/11/19 0806   BP: 159/82   Pulse: 77   Resp: 18   Temp: 97.6 °F (36.4 °C)   TempSrc: Oral   SpO2: 99%   Weight: 119 kg (261 lb 8 oz)   Height: 154.9 cm (60.98\")   PainSc: 0-No pain     Current Status 7/11/2019   ECOG score 0       Physical Exam   Constitutional: She is oriented to person, place, and time. She appears well-developed and well-nourished.   Morbidly obese  female   HENT:   Head: Normocephalic and atraumatic.   Nose: Nose normal.   Eyes: EOM are normal. Pupils are equal, round, and reactive to light.   Neck: Normal range of motion. Neck supple.   No lymphadenopathy noted in cervical, infraclavicular, supraclavicular, axillary or groin regions.   Cardiovascular: Normal rate, regular rhythm, normal heart sounds and intact distal pulses.   Pulmonary/Chest: Effort normal and breath sounds normal.   Abdominal: Soft. Bowel sounds are normal.   Musculoskeletal: Normal range of motion.   Lymphadenopathy:     She has no cervical adenopathy.   Neurological: She is alert and oriented to person, place, and time.   Skin: Skin is warm and dry.   Psychiatric: She has a normal mood and affect. Her behavior is normal. Thought content normal.         RECENT LABS:  Hematology WBC   Date Value Ref Range Status   07/11/2019 12.92 (H) 3.40 - 10.80 10*3/mm3 Final     RBC   Date Value Ref Range Status   07/11/2019 4.48 3.77 - 5.28 10*6/mm3 Final     Hemoglobin "   Date Value Ref Range Status   07/11/2019 11.0 (L) 12.0 - 15.9 g/dL Final     Hematocrit   Date Value Ref Range Status   07/11/2019 35.0 34.0 - 46.6 % Final     Platelets   Date Value Ref Range Status   07/11/2019 317 140 - 450 10*3/mm3 Final        CT CHEST, ABDOMEN, AND PELVIS WITH IV CONTRAST 7/5/19    FINDINGS:  CHEST: In addition to shotty nodes at both axilla, there are a few  mildly enlarged nodes. Two of the largest right axillary nodes both  measure 1.7 x 1.3 cm. The largest left axillary node is best seen on the  coronal reconstruction series measuring 2.0 x 0.9 cm. There is no  mediastinal or hilar lymphadenopathy. An AP window node measures 1.4 x  0.6 cm. There are no pulmonary airspace opacities and there are no  pleural or pericardial effusions.     ABDOMEN/PELVIS: Splenic size is normal and there is no lymphadenopathy.  There are fairly typical shotty nodes at the terrell hepatis and  mesentery. There is mild fatty infiltration of the liver. The  gallbladder appears unremarkable and there is no biliary dilatation. The  pancreas, adrenals, and kidneys appear unremarkable. No acute bowel  abnormality is seen. The appendix appears normal. There is very mild  sigmoid diverticulosis and no evidence for diverticulitis. The uterus  and adnexa appear unremarkable other than metallic wires within the  fallopian tubes.     IMPRESSION:  1. In addition to shotty nodes at the axilla, there are 2 enlarged right  axillary nodes and there is a borderline enlarged left axillary node.  There is no mediastinal or hilar lymphadenopathy.  2. Splenic size is normal and there is no lymphadenopathy within the  abdomen or pelvis.  3. Very mild sigmoid diverticulosis without evidence for diverticulitis.      Assessment/Plan        49-year-old female with a history of hyperlipidemia, GE reflux, seasonal allergies and morbid obesity recently accessing potential bariatric surgery.  She had undergone routine mammography in late  January showing new large lymphadenopathy in both axillae, confirmed on ultrasound and eventual needle biopsy February 18, 2019 showing benign lymphoid tissue.  Approximately at the same point the patient was developing significant anemia with February 18 assessment including H&H of 10.4 and 33.5 MCV not 77.2, MCH of 24.0, normal platelet count normal platelet count, and mild leukocytosis with normal differential.  The patient states that she was placed on oral iron but does not take it routinely and, indeed, her hemoglobin hematocrit are mildly improved.  Her symptoms however described as above are significant with excessive fatigue upon minimal activity, night sweats, temperature intolerance and shortness of breath again with minimal activity.     We have discussed an assessment to include not only her degree of anemia but also asked whether it is potentially associated with further lymphadenopathy at this point.  Finally she also has symptoms that could also include perimenopause.  As result we proceeded with studies finding her premenopausal and with bilateral axillary adenopathy including 2 enlarged right axillary nodes and a borderline enlarged left axillary node but no other significant findings except for mild sigmoid diverticulosis.   As the patient is reviewed July 11 her iron deficiency is significant enough to suggest additional iron though she is intolerant to oral therapy.  We therefore discussed an IV iron infusion in the next several weeks as she also pursues a bariatric assessment for weight management.  We would additionally follow bilateral axillary ultrasound perhaps in the next 6 months and potential endoscopy.    Plan:  *Return in the next 1 to 2 weeks for IV Injectafer  *Return 4 weeks later for repeat laboratory studies including iron status  *Follow-up with primary care as already scheduled as she approaches bariatric assessment and possible gastric surgery with Dr. Stafford  *Return 6 weeks  for repeat assessment and possible further IV iron as needed, discussions that point for endoscopy and follow-up bilateral axillary ultrasound.  At present removal of action lymph nodes does not appear necessary though if they enlarge further we will proceed to do so.

## 2019-07-11 ENCOUNTER — LAB (OUTPATIENT)
Dept: LAB | Facility: HOSPITAL | Age: 49
End: 2019-07-11

## 2019-07-11 ENCOUNTER — OFFICE VISIT (OUTPATIENT)
Dept: ONCOLOGY | Facility: CLINIC | Age: 49
End: 2019-07-11

## 2019-07-11 VITALS
HEIGHT: 61 IN | SYSTOLIC BLOOD PRESSURE: 159 MMHG | DIASTOLIC BLOOD PRESSURE: 82 MMHG | TEMPERATURE: 97.6 F | OXYGEN SATURATION: 99 % | WEIGHT: 261.5 LBS | HEART RATE: 77 BPM | BODY MASS INDEX: 49.37 KG/M2 | RESPIRATION RATE: 18 BRPM

## 2019-07-11 DIAGNOSIS — R59.0 AXILLARY LYMPHADENOPATHY: Primary | ICD-10-CM

## 2019-07-11 DIAGNOSIS — R59.1 LYMPHADENOPATHY: ICD-10-CM

## 2019-07-11 DIAGNOSIS — D50.0 IRON DEFICIENCY ANEMIA DUE TO CHRONIC BLOOD LOSS: Primary | ICD-10-CM

## 2019-07-11 PROBLEM — D64.9 ANEMIA: Status: RESOLVED | Noted: 2019-06-20 | Resolved: 2019-07-11

## 2019-07-11 LAB
BASOPHILS # BLD AUTO: 0.06 10*3/MM3 (ref 0–0.2)
BASOPHILS NFR BLD AUTO: 0.5 % (ref 0–1.5)
DEPRECATED RDW RBC AUTO: 45.1 FL (ref 37–54)
EOSINOPHIL # BLD AUTO: 0.61 10*3/MM3 (ref 0–0.4)
EOSINOPHIL NFR BLD AUTO: 4.7 % (ref 0.3–6.2)
ERYTHROCYTE [DISTWIDTH] IN BLOOD BY AUTOMATED COUNT: 15.9 % (ref 12.3–15.4)
HCT VFR BLD AUTO: 35 % (ref 34–46.6)
HGB BLD-MCNC: 11 G/DL (ref 12–15.9)
IMM GRANULOCYTES # BLD AUTO: 0.16 10*3/MM3 (ref 0–0.05)
IMM GRANULOCYTES NFR BLD AUTO: 1.2 % (ref 0–0.5)
LYMPHOCYTES # BLD AUTO: 4.04 10*3/MM3 (ref 0.7–3.1)
LYMPHOCYTES NFR BLD AUTO: 31.3 % (ref 19.6–45.3)
MCH RBC QN AUTO: 24.6 PG (ref 26.6–33)
MCHC RBC AUTO-ENTMCNC: 31.4 G/DL (ref 31.5–35.7)
MCV RBC AUTO: 78.1 FL (ref 79–97)
MONOCYTES # BLD AUTO: 0.64 10*3/MM3 (ref 0.1–0.9)
MONOCYTES NFR BLD AUTO: 5 % (ref 5–12)
NEUTROPHILS # BLD AUTO: 7.41 10*3/MM3 (ref 1.7–7)
NEUTROPHILS NFR BLD AUTO: 57.3 % (ref 42.7–76)
NRBC BLD AUTO-RTO: 0 /100 WBC (ref 0–0.2)
PLATELET # BLD AUTO: 317 10*3/MM3 (ref 140–450)
PMV BLD AUTO: 10.1 FL (ref 6–12)
RBC # BLD AUTO: 4.48 10*6/MM3 (ref 3.77–5.28)
WBC NRBC COR # BLD: 12.92 10*3/MM3 (ref 3.4–10.8)

## 2019-07-11 PROCEDURE — 36415 COLL VENOUS BLD VENIPUNCTURE: CPT | Performed by: INTERNAL MEDICINE

## 2019-07-11 PROCEDURE — 99215 OFFICE O/P EST HI 40 MIN: CPT | Performed by: INTERNAL MEDICINE

## 2019-07-11 PROCEDURE — 85025 COMPLETE CBC W/AUTO DIFF WBC: CPT | Performed by: INTERNAL MEDICINE

## 2019-07-11 RX ORDER — SODIUM CHLORIDE 9 MG/ML
250 INJECTION, SOLUTION INTRAVENOUS ONCE
Status: CANCELLED | OUTPATIENT
Start: 2019-07-19 | End: 2019-07-19

## 2019-07-11 RX ORDER — METHYLPREDNISOLONE SODIUM SUCCINATE 125 MG/2ML
125 INJECTION, POWDER, LYOPHILIZED, FOR SOLUTION INTRAMUSCULAR; INTRAVENOUS AS NEEDED
Status: CANCELLED | OUTPATIENT
Start: 2019-07-19

## 2019-07-11 RX ORDER — DIPHENHYDRAMINE HYDROCHLORIDE 50 MG/ML
50 INJECTION INTRAMUSCULAR; INTRAVENOUS AS NEEDED
Status: CANCELLED | OUTPATIENT
Start: 2019-07-19

## 2019-07-18 ENCOUNTER — TELEPHONE (OUTPATIENT)
Dept: OBSTETRICS AND GYNECOLOGY | Age: 49
End: 2019-07-18

## 2019-07-18 NOTE — TELEPHONE ENCOUNTER
Dr Rai pt is calling to see if another progress note can be sent to the Bariatric center. Pt states the last one sent was a duplicate. Pt asked if the appt visit before 01/29/2019 could be sent to the same fax number she provided to Opal and to shyla Haywood in Bariatrics. Please Advise

## 2019-07-19 ENCOUNTER — INFUSION (OUTPATIENT)
Dept: ONCOLOGY | Facility: HOSPITAL | Age: 49
End: 2019-07-19

## 2019-07-19 ENCOUNTER — DOCUMENTATION (OUTPATIENT)
Dept: ONCOLOGY | Facility: CLINIC | Age: 49
End: 2019-07-19

## 2019-07-19 VITALS
SYSTOLIC BLOOD PRESSURE: 123 MMHG | HEART RATE: 77 BPM | WEIGHT: 260 LBS | RESPIRATION RATE: 18 BRPM | BODY MASS INDEX: 49.15 KG/M2 | DIASTOLIC BLOOD PRESSURE: 73 MMHG

## 2019-07-19 DIAGNOSIS — D50.0 IRON DEFICIENCY ANEMIA DUE TO CHRONIC BLOOD LOSS: Primary | ICD-10-CM

## 2019-07-19 PROCEDURE — 96374 THER/PROPH/DIAG INJ IV PUSH: CPT | Performed by: INTERNAL MEDICINE

## 2019-07-19 PROCEDURE — 25010000002 FERRIC CARBOXYMALTOSE 750 MG/15ML SOLUTION 15 ML VIAL: Performed by: INTERNAL MEDICINE

## 2019-07-19 RX ORDER — SODIUM CHLORIDE 9 MG/ML
250 INJECTION, SOLUTION INTRAVENOUS ONCE
Status: COMPLETED | OUTPATIENT
Start: 2019-07-19 | End: 2019-07-19

## 2019-07-19 RX ORDER — DIPHENHYDRAMINE HYDROCHLORIDE 50 MG/ML
50 INJECTION INTRAMUSCULAR; INTRAVENOUS AS NEEDED
OUTPATIENT
Start: 2019-07-19

## 2019-07-19 RX ORDER — METHYLPREDNISOLONE SODIUM SUCCINATE 125 MG/2ML
125 INJECTION, POWDER, LYOPHILIZED, FOR SOLUTION INTRAMUSCULAR; INTRAVENOUS AS NEEDED
OUTPATIENT
Start: 2019-07-19

## 2019-07-19 RX ORDER — SODIUM CHLORIDE 9 MG/ML
250 INJECTION, SOLUTION INTRAVENOUS ONCE
OUTPATIENT
Start: 2019-07-19 | End: 2019-07-19

## 2019-07-19 RX ADMIN — FERRIC CARBOXYMALTOSE INJECTION 750 MG: 50 INJECTION, SOLUTION INTRAVENOUS at 15:58

## 2019-07-19 RX ADMIN — SODIUM CHLORIDE 250 ML: 9 INJECTION, SOLUTION INTRAVENOUS at 15:58

## 2019-07-19 NOTE — PROGRESS NOTES
I was notified yesterday by Glenny that pt is on the schedule for 7/19/19 for Injectafer. Pt has a deductible that needs to be met and Glenny was asking about the copay card for her. The copay card will cover up to $500 per injection. Pt will have some responsibility after the card pays and Glenny explained this to her. Pt in the office today and I have obtained her signature to enroll her. I have enrolled her into the program and all information was sent to Glenny Yousif for processing.

## 2019-08-16 ENCOUNTER — LAB (OUTPATIENT)
Dept: LAB | Facility: HOSPITAL | Age: 49
End: 2019-08-16

## 2019-08-16 DIAGNOSIS — D50.0 IRON DEFICIENCY ANEMIA DUE TO CHRONIC BLOOD LOSS: ICD-10-CM

## 2019-08-16 LAB
BASOPHILS # BLD AUTO: 0.06 10*3/MM3 (ref 0–0.2)
BASOPHILS NFR BLD AUTO: 0.5 % (ref 0–1.5)
DEPRECATED RDW RBC AUTO: 54.2 FL (ref 37–54)
EOSINOPHIL # BLD AUTO: 0.43 10*3/MM3 (ref 0–0.4)
EOSINOPHIL NFR BLD AUTO: 3.4 % (ref 0.3–6.2)
ERYTHROCYTE [DISTWIDTH] IN BLOOD BY AUTOMATED COUNT: 17.3 % (ref 12.3–15.4)
FERRITIN SERPL-MCNC: 352 NG/ML (ref 11–207)
HCT VFR BLD AUTO: 38.7 % (ref 34–46.6)
HGB BLD-MCNC: 11.7 G/DL (ref 12–15.9)
IMM GRANULOCYTES # BLD AUTO: 0.06 10*3/MM3 (ref 0–0.05)
IMM GRANULOCYTES NFR BLD AUTO: 0.5 % (ref 0–0.5)
IRON 24H UR-MRATE: 39 MCG/DL (ref 37–145)
IRON SATN MFR SERPL: 15 % (ref 14–48)
LYMPHOCYTES # BLD AUTO: 3.12 10*3/MM3 (ref 0.7–3.1)
LYMPHOCYTES NFR BLD AUTO: 24.6 % (ref 19.6–45.3)
MCH RBC QN AUTO: 25.9 PG (ref 26.6–33)
MCHC RBC AUTO-ENTMCNC: 30.2 G/DL (ref 31.5–35.7)
MCV RBC AUTO: 85.6 FL (ref 79–97)
MONOCYTES # BLD AUTO: 0.64 10*3/MM3 (ref 0.1–0.9)
MONOCYTES NFR BLD AUTO: 5 % (ref 5–12)
NEUTROPHILS # BLD AUTO: 8.37 10*3/MM3 (ref 1.7–7)
NEUTROPHILS NFR BLD AUTO: 66 % (ref 42.7–76)
NRBC BLD AUTO-RTO: 0 /100 WBC (ref 0–0.2)
PLATELET # BLD AUTO: 325 10*3/MM3 (ref 140–450)
PMV BLD AUTO: 9.3 FL (ref 6–12)
RBC # BLD AUTO: 4.52 10*6/MM3 (ref 3.77–5.28)
TIBC SERPL-MCNC: 269 MCG/DL (ref 249–505)
TRANSFERRIN SERPL-MCNC: 192 MG/DL (ref 200–360)
WBC NRBC COR # BLD: 12.68 10*3/MM3 (ref 3.4–10.8)

## 2019-08-16 PROCEDURE — 85025 COMPLETE CBC W/AUTO DIFF WBC: CPT

## 2019-08-16 PROCEDURE — 84466 ASSAY OF TRANSFERRIN: CPT

## 2019-08-16 PROCEDURE — 36415 COLL VENOUS BLD VENIPUNCTURE: CPT

## 2019-08-16 PROCEDURE — 83540 ASSAY OF IRON: CPT

## 2019-08-16 PROCEDURE — 82728 ASSAY OF FERRITIN: CPT

## 2019-08-22 ENCOUNTER — APPOINTMENT (OUTPATIENT)
Dept: ONCOLOGY | Facility: CLINIC | Age: 49
End: 2019-08-22

## 2019-08-22 ENCOUNTER — APPOINTMENT (OUTPATIENT)
Dept: LAB | Facility: HOSPITAL | Age: 49
End: 2019-08-22

## 2019-09-13 DIAGNOSIS — D50.0 IRON DEFICIENCY ANEMIA DUE TO CHRONIC BLOOD LOSS: Primary | ICD-10-CM

## 2019-09-16 DIAGNOSIS — G47.33 OSA (OBSTRUCTIVE SLEEP APNEA): ICD-10-CM

## 2019-09-16 DIAGNOSIS — K21.9 GASTROESOPHAGEAL REFLUX DISEASE, ESOPHAGITIS PRESENCE NOT SPECIFIED: ICD-10-CM

## 2019-09-16 DIAGNOSIS — M25.50 MULTIPLE JOINT PAIN: ICD-10-CM

## 2019-09-16 DIAGNOSIS — R73.03 PREDIABETES: ICD-10-CM

## 2019-09-16 DIAGNOSIS — E78.5 HYPERLIPIDEMIA, UNSPECIFIED HYPERLIPIDEMIA TYPE: ICD-10-CM

## 2019-09-16 PROBLEM — R53.82 CHRONIC FATIGUE: Status: ACTIVE | Noted: 2019-09-16

## 2019-09-16 PROBLEM — G47.00 INSOMNIA: Status: ACTIVE | Noted: 2019-09-16

## 2019-09-17 ENCOUNTER — HOSPITAL ENCOUNTER (OUTPATIENT)
Dept: GENERAL RADIOLOGY | Facility: HOSPITAL | Age: 49
Discharge: HOME | End: 2019-09-17
Admitting: NURSE PRACTITIONER

## 2019-09-17 ENCOUNTER — LAB (OUTPATIENT)
Dept: LAB | Facility: HOSPITAL | Age: 49
End: 2019-09-17

## 2019-09-17 ENCOUNTER — CONSULT (OUTPATIENT)
Dept: BARIATRICS/WEIGHT MGMT | Facility: CLINIC | Age: 49
End: 2019-09-17

## 2019-09-17 VITALS
WEIGHT: 261 LBS | HEIGHT: 61 IN | TEMPERATURE: 97.6 F | BODY MASS INDEX: 49.28 KG/M2 | SYSTOLIC BLOOD PRESSURE: 121 MMHG | RESPIRATION RATE: 18 BRPM | DIASTOLIC BLOOD PRESSURE: 80 MMHG | HEART RATE: 70 BPM

## 2019-09-17 DIAGNOSIS — G47.33 OSA (OBSTRUCTIVE SLEEP APNEA): ICD-10-CM

## 2019-09-17 DIAGNOSIS — K21.9 GASTROESOPHAGEAL REFLUX DISEASE, ESOPHAGITIS PRESENCE NOT SPECIFIED: ICD-10-CM

## 2019-09-17 DIAGNOSIS — M25.50 MULTIPLE JOINT PAIN: ICD-10-CM

## 2019-09-17 DIAGNOSIS — R73.03 PREDIABETES: ICD-10-CM

## 2019-09-17 DIAGNOSIS — R53.82 CHRONIC FATIGUE: ICD-10-CM

## 2019-09-17 DIAGNOSIS — E78.5 HYPERLIPIDEMIA, UNSPECIFIED HYPERLIPIDEMIA TYPE: ICD-10-CM

## 2019-09-17 DIAGNOSIS — D50.0 IRON DEFICIENCY ANEMIA DUE TO CHRONIC BLOOD LOSS: ICD-10-CM

## 2019-09-17 LAB
ALBUMIN SERPL-MCNC: 4.1 G/DL (ref 3.5–5.2)
ALBUMIN/GLOB SERPL: 1.3 G/DL
ALP SERPL-CCNC: 93 U/L (ref 39–117)
ALT SERPL W P-5'-P-CCNC: 10 U/L (ref 1–33)
ANION GAP SERPL CALCULATED.3IONS-SCNC: 12.3 MMOL/L (ref 5–15)
AST SERPL-CCNC: 10 U/L (ref 1–32)
BASOPHILS # BLD AUTO: 0.06 10*3/MM3 (ref 0–0.2)
BASOPHILS NFR BLD AUTO: 0.6 % (ref 0–1.5)
BILIRUB SERPL-MCNC: <0.2 MG/DL (ref 0.2–1.2)
BUN BLD-MCNC: 12 MG/DL (ref 6–20)
BUN/CREAT SERPL: 16.9 (ref 7–25)
CALCIUM SPEC-SCNC: 9.3 MG/DL (ref 8.6–10.5)
CHLORIDE SERPL-SCNC: 99 MMOL/L (ref 98–107)
CHOLEST SERPL-MCNC: 211 MG/DL (ref 0–200)
CO2 SERPL-SCNC: 26.7 MMOL/L (ref 22–29)
CREAT BLD-MCNC: 0.71 MG/DL (ref 0.57–1)
DEPRECATED RDW RBC AUTO: 55.6 FL (ref 37–54)
EOSINOPHIL # BLD AUTO: 0.42 10*3/MM3 (ref 0–0.4)
EOSINOPHIL NFR BLD AUTO: 4 % (ref 0.3–6.2)
ERYTHROCYTE [DISTWIDTH] IN BLOOD BY AUTOMATED COUNT: 17.2 % (ref 12.3–15.4)
FERRITIN SERPL-MCNC: 228 NG/ML (ref 13–150)
GFR SERPL CREATININE-BSD FRML MDRD: 88 ML/MIN/1.73
GLOBULIN UR ELPH-MCNC: 3.1 GM/DL
GLUCOSE BLD-MCNC: 101 MG/DL (ref 65–99)
HBA1C MFR BLD: 5.5 % (ref 4.8–5.6)
HCT VFR BLD AUTO: 39.6 % (ref 34–46.6)
HDLC SERPL-MCNC: 38 MG/DL (ref 40–60)
HGB BLD-MCNC: 11.9 G/DL (ref 12–15.9)
IMM GRANULOCYTES # BLD AUTO: 0.04 10*3/MM3 (ref 0–0.05)
IMM GRANULOCYTES NFR BLD AUTO: 0.4 % (ref 0–0.5)
IRON 24H UR-MRATE: 44 MCG/DL (ref 37–145)
IRON SATN MFR SERPL: 15 % (ref 20–50)
LDLC SERPL CALC-MCNC: 145 MG/DL (ref 0–100)
LDLC/HDLC SERPL: 3.81 {RATIO}
LYMPHOCYTES # BLD AUTO: 3.07 10*3/MM3 (ref 0.7–3.1)
LYMPHOCYTES NFR BLD AUTO: 29.4 % (ref 19.6–45.3)
MCH RBC QN AUTO: 26.2 PG (ref 26.6–33)
MCHC RBC AUTO-ENTMCNC: 30.1 G/DL (ref 31.5–35.7)
MCV RBC AUTO: 87.2 FL (ref 79–97)
MONOCYTES # BLD AUTO: 0.67 10*3/MM3 (ref 0.1–0.9)
MONOCYTES NFR BLD AUTO: 6.4 % (ref 5–12)
NEUTROPHILS # BLD AUTO: 6.17 10*3/MM3 (ref 1.7–7)
NEUTROPHILS NFR BLD AUTO: 59.2 % (ref 42.7–76)
NRBC BLD AUTO-RTO: 0 /100 WBC (ref 0–0.2)
PLATELET # BLD AUTO: 343 10*3/MM3 (ref 140–450)
PMV BLD AUTO: 10.1 FL (ref 6–12)
POTASSIUM BLD-SCNC: 4 MMOL/L (ref 3.5–5.2)
PROT SERPL-MCNC: 7.2 G/DL (ref 6–8.5)
RBC # BLD AUTO: 4.54 10*6/MM3 (ref 3.77–5.28)
SODIUM BLD-SCNC: 138 MMOL/L (ref 136–145)
TIBC SERPL-MCNC: 285 MCG/DL (ref 298–536)
TRANSFERRIN SERPL-MCNC: 191 MG/DL (ref 200–360)
TRIGL SERPL-MCNC: 142 MG/DL (ref 0–150)
TSH SERPL DL<=0.05 MIU/L-ACNC: 4.3 UIU/ML (ref 0.27–4.2)
VLDLC SERPL-MCNC: 28.4 MG/DL (ref 5–40)
WBC NRBC COR # BLD: 10.43 10*3/MM3 (ref 3.4–10.8)

## 2019-09-17 PROCEDURE — 36415 COLL VENOUS BLD VENIPUNCTURE: CPT

## 2019-09-17 PROCEDURE — 84443 ASSAY THYROID STIM HORMONE: CPT

## 2019-09-17 PROCEDURE — 83540 ASSAY OF IRON: CPT

## 2019-09-17 PROCEDURE — 83036 HEMOGLOBIN GLYCOSYLATED A1C: CPT

## 2019-09-17 PROCEDURE — 80053 COMPREHEN METABOLIC PANEL: CPT

## 2019-09-17 PROCEDURE — 85025 COMPLETE CBC W/AUTO DIFF WBC: CPT

## 2019-09-17 PROCEDURE — 80061 LIPID PANEL: CPT

## 2019-09-17 PROCEDURE — 71046 X-RAY EXAM CHEST 2 VIEWS: CPT

## 2019-09-17 PROCEDURE — 82728 ASSAY OF FERRITIN: CPT

## 2019-09-17 PROCEDURE — 99205 OFFICE O/P NEW HI 60 MIN: CPT | Performed by: NURSE PRACTITIONER

## 2019-09-17 PROCEDURE — 84466 ASSAY OF TRANSFERRIN: CPT

## 2019-09-17 PROCEDURE — 94690 O2 UPTK REST INDIRECT: CPT | Performed by: NURSE PRACTITIONER

## 2019-09-17 NOTE — PROGRESS NOTES
[facility]  [address] [address]  [phone]  [address] [id number] [phone]  Dept: [phone]  9/17/2019      Armin Viramontes.  [id number]  [id number]  1970  female      Chief Complaint of weight gain; unable to maintain weight loss    History of Present Illness:   Armin is a 49 y.o. female who presents today for evaluation, education and consultation regarding weight loss surgery. The patient is interested in the sleeve gastrectomy.      Diet History:Armin has been overweight for at least 25 years, has been 35 pounds or more overweight for at least 25 years, has been 100 pounds or more overweight for 10 or more years and started dieting at age 23.  The most weight Armin lost was 35 pounds on exercise and reduce calorie diet and maintained the weight loss for 2 years. Armin describes her eating habits as snacking without portion control, drinking coffee and soda, eating foods with a low nutrient density or out of convenience. Armin Viramontes has tried Weight Watchers, Fasting, reduced calorie and exercising among others with success of losing up to 35 pounds, but in each instance regained the weight.      See dietician documentation for complete history.    Bariatric Surgery Evaluation: The patient is being seen for an initial visit for bariatric surgery evaluation.     Bariatric Co-morbidities:  sleep apnea, dyslipidemia, back pain, knee pain, GERD and prediabetes    Patient Active Problem List   Diagnosis   • Seasonal allergies   • Hyperlipidemia   • GERD (gastroesophageal reflux disease)   • BMI 45.0-49.9, adult (CMS/[facility])   • HPV in female   • Family history of ovarian cancer   • Lymphadenopathy   • Iron deficiency anemia due to chronic blood loss   • Insomnia   • Chronic fatigue   • JAZMYN (obstructive sleep apnea)   • Multiple joint pain   • Prediabetes       Past Medical History:   Diagnosis  Date   • Anemia    • Arthritis    • GERD (gastroesophageal reflux disease)    • Hair loss    • History of obesity    • HPV in female 2012   • Hyperlipidemia    • Infectious mononucleosis    • Lymphadenopathy 02/2019    Right axilla   • JAZMYN (obstructive sleep apnea)    • Patient exposure to body fluids    • Seasonal allergies        Past Surgical History:   Procedure Laterality Date   • ESSURE TUBAL LIGATION     • TONSILLECTOMY         Allergies   Allergen Reactions   • Erythromycin    • Onion Photosensitivity   • Vit C-Cholecalciferol-Rochelle Hip [Cholecalciferol-Vitamin C]          Current Outpatient Medications:   •  CBD (cannabidiol) oral oil, Take  by mouth., Disp: , Rfl:   •  cetirizine (zyrTEC) 10 MG tablet, Take 10 mg by mouth Daily., Disp: , Rfl:   •  Cholecalciferol (VITAMIN D3) 5000 units capsule capsule, Take 5,000 Units by mouth Daily., Disp: , Rfl:   •  fluconazole (DIFLUCAN) 150 MG tablet, Take 1 tablet by mouth Daily., Disp: 1 tablet, Rfl: 0  •  Multiple Vitamins-Minerals (MULTIVITAMIN ADULT PO), Take  by mouth., Disp: , Rfl:   •  omeprazole (priLOSEC) 40 MG capsule, , Disp: , Rfl:   •  Probiotic Product (PROBIOTIC-10 PO), Take  by mouth., Disp: , Rfl:   •  TURMERIC PO, Take  by mouth., Disp: , Rfl:   •  vitamin B-12 (CYANOCOBALAMIN) 1000 MCG tablet, Take 1,000 mcg by mouth Daily., Disp: , Rfl:     Social History     Socioeconomic History   • Marital status: Single     Spouse name: Not on file   • Number of children: Not on file   • Years of education: College   • Highest education level: Not on file   Occupational History   • Occupation: Director     Employer: [employer]   Tobacco Use   • Smoking status: Never Smoker   • Smokeless tobacco: Never Used   Substance and Sexual Activity   • Alcohol use: Yes     Frequency: Monthly or less     Comment: social   • Drug use: No   • Sexual activity: Defer     Partners: Male     Birth control/protection: Surgical       Family History   Problem Relation Age of  Onset   • Hypertension Mother    • Hyperlipidemia Mother    • Breast cancer Mother 62        DCIS   • Thyroid disease Mother    • Diabetes Father    • Hyperlipidemia Father    • Heart attack Father    • Stroke Father    • Heart disease Father         CHF   • Obesity Father    • Endometriosis Maternal Grandmother    • Ovarian cancer Maternal Grandmother 49        gyn ? ovarian   • Osteoporosis Maternal Grandfather          Review of Systems:  Review of Systems   Constitutional: Positive for fatigue.   HENT: Negative.    Respiratory: Negative.    Cardiovascular: Negative.    Gastrointestinal: Negative.    Endocrine: Negative.    Genitourinary: Negative.    Musculoskeletal: Negative for back pain.   Skin: Negative.    Neurological: Negative.    Psychiatric/Behavioral: Negative.        Physical Exam:  Vital Signs:  Weight: 118 kg (261 lb)   Body mass index is 49.28 kg/m².  Temp: 97.6 °F (36.4 °C)   Heart Rate: 70   BP: 121/80     Physical Exam   Constitutional: She is oriented to person, place, and time. She appears well-developed and well-nourished.   HENT:   Head: Normocephalic and atraumatic.   Neck: Normal range of motion.   Cardiovascular: Normal rate, regular rhythm and normal heart sounds.   Pulmonary/Chest: Effort normal and breath sounds normal. No respiratory distress. She has no wheezes.   Abdominal: Soft. Bowel sounds are normal. She exhibits no distension. There is no tenderness.   Musculoskeletal: She exhibits no edema or deformity.   Neurological: She is alert and oriented to person, place, and time.   Skin: Skin is warm and dry.   Psychiatric: She has a normal mood and affect. Her behavior is normal.   Nursing note and vitals reviewed.         Assessment:         Armin Viramontes is a 49 y.o. year old female with medically complicated severe obesity. Weight: 118 kg (261 lb), Body mass index is 49.28 kg/m². and weight related problems including sleep apnea, dyslipidemia, back pain, knee pain, GERD and  prediabetes.    I explained in detail the procedures that we are performing.  All of those procedures can be performed laparoscopically but there is a chance to convert to open if any technical challenges or complications do occur.  Bariatric surgery is not cosmetic surgery but rather a tool to help a patient make a life-long commitment lifestyle changes including diet, exercise, behavior changes, and taking supplemental vitamins and minerals.    Due to the patient's BMI and co-morbidities they are at a high risk for surgery and will obtain the following:  The patient has been advised that a letter of medical support and a history and physical must be obtained from her primary care physician. A psychological evaluation will be arranged for this patient. CBC, CMP, FLP, TSH and HgbA1C will be drawn. Armin Viramontes will obtain a pre-operative CXR and EKG. Armin Viramontes will obtain clearance from a cardiologist prior to surgery.     Armin Viramontes will be set up for a pre-operative diagnostic esophagogastroduodenoscopy with biopsy for evaluation. The risks and benefits of the procedure were discussed with the patient in detail and all questions were answered.  Possibility of perforation, bleeding, aspiration, anoxic brain injury, respiratory and/or cardiac arrest and death were discussed.   She received handouts regarding, all questions were answered and informed consent was obtained.     The risks, benefits, alternatives, and potential complications of all of the procedures were explained in detail including, but not limited to death, anesthesia and medication adverse effect/DVT, pulmonary embolism, trocar site/incisional hernia, wound infection, abdominal infection, bleeding, failure to lose weight or gain weight and change in body image, metabolic complications with calcium, thiamine, vitamin B12, folate, iron, and anemia.    The patient was advised to start a high protein, low fat and low carbohydrate diet. The  patient was given individualized information by our dietician along with general group information and handouts.     The patient had the Basal Metabolic Rate test performed in our office today then I reviewed the results with them including changes to help increase metabolism and a recommended daily caloric intake range- see scanned results.     The patient was given information regarding the JOSE DE JESUS educational video. JOS EDE JESUS is an internet based educational video which explains the surgical procedure and answers basic questions regarding the procedure. The patient was provided with instructions and a password to watch the video.    The patient was encouraged to start routine exercise including but not limited to 150 minutes per week. The patient received a resistance band along with a handout of exercises.     The consultation plan was reviewed with the patient.    The patient understands the surgical procedures and the different surgical options that are available.  She understands the lifestyle changes that would be required after surgery and has agreed to participate in a pre-operative and postoperative weight management program.  She also expressed understanding of possible risks, had several questions answered and desires to proceed.    I think she is a good candidate for this surgery, and is interested in a sleeve gastrectomy.    Encounter Diagnoses   Name Primary?   • BMI 45.0-49.9, adult (CMS/[facility]) Yes   • Gastroesophageal reflux disease, esophagitis presence not specified    • JAZMYN (obstructive sleep apnea)    • Hyperlipidemia, unspecified hyperlipidemia type    • Multiple joint pain    • Chronic fatigue    • Prediabetes        Plan:    Patient will have evaluations and follow up with bariatric dieticians and a psychologist before undergoing a multidisciplinary review of her candidacy.  We also discussed the weight loss requirement and rationale, and other program requirements.      Michelle Sommer,  APRN  9/17/2019

## 2019-09-17 NOTE — PROGRESS NOTES
"Bariatric Nutrition Counseling Interview    Patient Name:  Armin Viramontes  YOB: 1970  Age:  49 y.o.  Sex:  female  MRN: [id number]  Date:  09/17/19    Procedure Considering:  Sleeve    Last Documented Height:    Ht Readings from Last 1 Encounters:   09/17/19 155 cm (61.02\")     Last Documented Weight:   Wt Readings from Last 1 Encounters:   09/17/19 118 kg (261 lb)      Body mass index is 49.28 kg/m².    Highest Weight:  260  Goal Weight: 150    History:  Past Medical History:   Diagnosis Date   • Anemia    • Arthritis    • GERD (gastroesophageal reflux disease)    • Hair loss    • History of obesity    • HPV in female 2012   • Hyperlipidemia    • Infectious mononucleosis    • Lymphadenopathy 02/2019    Right axilla   • JAZMYN (obstructive sleep apnea)    • Patient exposure to body fluids    • Seasonal allergies      Past Surgical History:   Procedure Laterality Date   • ESSURE TUBAL LIGATION     • TONSILLECTOMY       Family History   Problem Relation Age of Onset   • Hypertension Mother    • Hyperlipidemia Mother    • Breast cancer Mother 62        DCIS   • Thyroid disease Mother    • Diabetes Father    • Hyperlipidemia Father    • Heart attack Father    • Stroke Father    • Heart disease Father         CHF   • Obesity Father    • Endometriosis Maternal Grandmother    • Ovarian cancer Maternal Grandmother 49        gyn ? ovarian   • Osteoporosis Maternal Grandfather      Social History     Socioeconomic History   • Marital status: Single     Spouse name: Not on file   • Number of children: Not on file   • Years of education: College   • Highest education level: Not on file   Occupational History   • Occupation: Director     Employer: [employer]   Tobacco Use   • Smoking status: Never Smoker   • Smokeless tobacco: Never Used   Substance and Sexual Activity   • Alcohol use: Yes     Frequency: Monthly or less     Comment: social   • Drug use: No   • Sexual activity: Defer     Partners: Male     Birth " control/protection: Surgical     Additional Health Issues to Consider:  hyperhydrosis per patient; vitamin C allergy noted, patient states that it just causes sneezing and that she doesn't think it has been a problem lately, informed her about the vitamin C in supplements and meal replacement shakes and she doesn't think it will be an issue.    Weight History:  overweight since college but plantar fasciitis caused her to decrease activity and gain weight    Previous Weight Loss Efforts:  Weight Watchers, meal planning, exercise, calorie counting  Most Successful Weight Loss Effort:  lost 35lb meal planning and exercise    Eating Habits: Eat out of boredom, Snacking on high calorie foods  Eat three meals on most days?  Yes  Worst eating habit?  Snacking on high calorie foods    How often do you eat fast food? 3-4 times weekly    Do you exercise regularly? (at least 3 times each week)  No - plantar fasciitis    Occupation:  [employer], non-sedentary    Personal Goal After Procedure:  To work out again and potentially run   Personal Support:  friends    Assessment:  Program materials for successful weight loss before/after bariatric surgery were provided, reviewed, and discussed. The significance of taking in at least 70g of protein and 64 ounces of fluid was emphasized. The importance of routine exercise was discussed. Nutrition materials provided included a reduced calorie meal plan, protein sources, snack options, and diet guidelines post-surgery. Discussed personal habits and lifestyle behaviors that may influence diet efforts. She demonstrated a good comprehension of diet requirements and a commitment to work on personal challenges.  Patient was also provided a list of short-term goals to work towards prior to surgery. She appears to be an appropriate candidate for bariatric surgery.    Electronically signed by:  Dorys Whitley RD  09/17/19 2:02 PM

## 2019-09-18 ENCOUNTER — APPOINTMENT (OUTPATIENT)
Dept: LAB | Facility: HOSPITAL | Age: 49
End: 2019-09-18

## 2019-09-18 ENCOUNTER — OFFICE VISIT (OUTPATIENT)
Dept: ONCOLOGY | Facility: CLINIC | Age: 49
End: 2019-09-18

## 2019-09-18 VITALS
HEART RATE: 72 BPM | OXYGEN SATURATION: 98 % | DIASTOLIC BLOOD PRESSURE: 75 MMHG | RESPIRATION RATE: 18 BRPM | WEIGHT: 260.3 LBS | SYSTOLIC BLOOD PRESSURE: 116 MMHG | HEIGHT: 61 IN | TEMPERATURE: 97.5 F | BODY MASS INDEX: 49.14 KG/M2

## 2019-09-18 DIAGNOSIS — D50.0 IRON DEFICIENCY ANEMIA DUE TO CHRONIC BLOOD LOSS: Primary | ICD-10-CM

## 2019-09-18 PROCEDURE — G0463 HOSPITAL OUTPT CLINIC VISIT: HCPCS | Performed by: INTERNAL MEDICINE

## 2019-09-18 PROCEDURE — 99213 OFFICE O/P EST LOW 20 MIN: CPT | Performed by: INTERNAL MEDICINE

## 2019-09-18 RX ORDER — FLUTICASONE PROPIONATE 50 MCG
1 SPRAY, SUSPENSION (ML) NASAL DAILY
Status: ON HOLD | COMMUNITY
Start: 2019-08-21 | End: 2020-08-27

## 2019-09-18 NOTE — PROGRESS NOTES
Subjective Patient feels about the same, quite concerned about her deconditioning, no significant improvement despite iron repletion has not noted any additional adenopathy.    REASON FOR CONSULTATION: Lymphadenopathy, anemia          REQUESTING PHYSICIAN: Raz Floyd MD, Curtis Rai MD    History of Present Illness ;  The patient is a 49-year-old female followed with a history of hyperlipidemia, GE reflux and seasonal allergies.  She also follows with her gynecologist routinely and been seen January 25 with concerns about her weight and a referral to bariatric surgery was pursued as well as screening colonoscopy.  She has a history of HPV and evidently a family history of ovarian malignancy.    The patient had undergone routine mammography January 28 revealing scattered areas of fibroglandular density and new large action lymph nodes in both axillae right greater than left.  Further ultrasound was performed February 7 demonstrating abnormal axillary lymph nodes correlating with mammographic findings both axillae.  Biopsy was performed February 18, 2019- Pathology reveals benign lymphoid tissue.  Flow cytometry also revealed no significant lymphoid immunophenotypic abnormalities with a histologic features and findings of flow cytometry and immunohistochemistry consistent with benign tissue.Additional testing in February include H&H of 10.4 and 33.5 with white count 13,800, platelet count 3 74,000 with a normal differential.    We are asked to see the patient for her anemia as well as her lymphadenopathy.  She provides a history that includes a number of symptoms including night sweats, excessive fatigue, periodic rash, shortness of breath with modest activity, periodic ankle swelling, frequency of urination and a general reduction of stamina when usually she is quite active and energetic.     The patient subsequent studies included a normal ferritin though evidence of reduced iron saturation, normal LDH,  electrophoresis without evidence of monoclonality, normal uric acid, mildly elevated sed rate to 45, FSH of 12.4, LH of 9.27,Estradiol of 41.2, normal LDH, negative evidence for flow cytometry for monoclonal population and CT studies of the chest and abdomen revealing shotty axillary nodes as well as to additionally enlarged right axillary nodes and a borderline enlarged left axillary node.  The largest left axillary is 2.0 x 0.9.  There is no mediastinal or hilar adenopathy or abdominal findings.  As the patient's reexamined and interviewed July 11 she feels about the same still with a degree of fatigue.  She had been reevaluated by women[facility] with repeat axillary ultrasound showing no change for the findings and we have discussed the above findings which again support bilateral axillary adenopathy but no other findings.  She is additionally iron deficient and intolerant to oral iron with nausea and constipation.  She indicates she is actually been iron deficient for many years attempting oral iron in her teens.  We have also discussed potential endoscopy and she indicates that she is going to be seen bariatric surgery in the near future to manage her weight.  Patient is next seen September 18, 2019.  She is been reviewed by bariatric surgery and is proceeding to the processes to be considered for procedures.  She has no additional fever or chills and states that her energy and stamina have not improved substantially despite being on replete assessment September 17 with ferritin of 228, TIBC 285, iron 44 and 15% saturation.  She has not noted any other findings including no additional adenopathy.    Past Medical History:   Diagnosis Date   • Anemia    • Arthritis    • GERD (gastroesophageal reflux disease)    • Hair loss    • History of obesity    • HPV in female 2012   • Hyperlipidemia    • Infectious mononucleosis    • Lymphadenopathy 02/2019    Right axilla   • JAZMYN (obstructive sleep apnea)    • Patient  exposure to body fluids    • Seasonal allergies         Past Surgical History:   Procedure Laterality Date   • ESSURE TUBAL LIGATION     • TONSILLECTOMY          Current Outpatient Medications on File Prior to Visit   Medication Sig Dispense Refill   • CBD (cannabidiol) oral oil Take  by mouth.     • cetirizine (zyrTEC) 10 MG tablet Take 10 mg by mouth Daily.     • Cholecalciferol (VITAMIN D3) 5000 units capsule capsule Take 5,000 Units by mouth Daily.     • fluticasone (FLONASE) 50 MCG/ACT nasal spray      • omeprazole (priLOSEC) 40 MG capsule      • Probiotic Product (PROBIOTIC-10 PO) Take  by mouth.     • TURMERIC PO Take  by mouth.     • Unable to find 1 each 1 (One) Time. Med Name: Blood stasis-herbal supplement. Daily     • vitamin B-12 (CYANOCOBALAMIN) 1000 MCG tablet Take 1,000 mcg by mouth Daily.     • fluconazole (DIFLUCAN) 150 MG tablet Take 1 tablet by mouth Daily. 1 tablet 0   • Multiple Vitamins-Minerals (MULTIVITAMIN ADULT PO) Take  by mouth.       No current facility-administered medications on file prior to visit.         ALLERGIES:    Allergies   Allergen Reactions   • Erythromycin    • Onion Photosensitivity   • Vit C-Cholecalciferol-Rochelle Hip [Cholecalciferol-Vitamin C]         Social History     Socioeconomic History   • Marital status: Single     Spouse name: Not on file   • Number of children: Not on file   • Years of education: College   • Highest education level: Not on file   Occupational History   • Occupation: Director     Employer: [employer]   Tobacco Use   • Smoking status: Never Smoker   • Smokeless tobacco: Never Used   Substance and Sexual Activity   • Alcohol use: Yes     Frequency: Monthly or less     Comment: social   • Drug use: No   • Sexual activity: Defer     Partners: Male     Birth control/protection: Surgical        Family History   Problem Relation Age of Onset   • Hypertension Mother    • Hyperlipidemia Mother    • Breast cancer Mother 62        DCIS   • Thyroid  "disease Mother    • Diabetes Father    • Hyperlipidemia Father    • Heart attack Father    • Stroke Father    • Heart disease Father         CHF   • Obesity Father    • Endometriosis Maternal Grandmother    • Ovarian cancer Maternal Grandmother 49        gyn ? ovarian   • Osteoporosis Maternal Grandfather         Review of Systems   Constitutional: Positive for activity change (09/18/19-Unchanged), chills (09/18/19-Unchanged), diaphoresis (09/18/19-Unchanged) and unexpected weight change (09/18/19-Unchanged). Negative for appetite change, fatigue and fever.   HENT: Negative for hearing loss, sore throat and trouble swallowing.    Respiratory: Positive for shortness of breath (09/18/19-Unchanged). Negative for cough, chest tightness and wheezing.    Cardiovascular: Positive for palpitations (09/18/19-Unchanged) and leg swelling (09/18/19-Unchanged Bilat knee). Negative for chest pain.   Gastrointestinal: Positive for nausea (09/18/19-Improved). Negative for abdominal distention, abdominal pain, constipation, diarrhea and vomiting.   Genitourinary: Negative for dysuria, frequency, hematuria and urgency.   Musculoskeletal: Positive for arthralgias (09/18/19-Unchanged) and neck pain (09/18/19-Glands swollen both sides of neck.). Negative for joint swelling.        No muscle weakness.   Skin: Negative for rash and wound.   Neurological: Negative for dizziness, seizures, syncope, speech difficulty, weakness, numbness and headaches.   Hematological: Negative for adenopathy. Does not bruise/bleed easily.   Psychiatric/Behavioral: Negative for behavioral problems, confusion and suicidal ideas.        Objective     Vitals:    09/18/19 1508   BP: 116/75   Pulse: 72   Resp: 18   Temp: 97.5 °F (36.4 °C)   TempSrc: Oral   SpO2: 98%   Weight: 118 kg (260 lb 4.8 oz)   Height: 155 cm (61.02\")   PainSc:   6   PainLoc: Generalized  Comment: Neck,bilat foot,and lt elbow pain     Current Status 9/18/2019   ECOG score 0       Physical " Exam   Constitutional: She is oriented to person, place, and time. She appears well-developed and well-nourished.   Morbidly obese  female   HENT:   Head: Normocephalic and atraumatic.   Nose: Nose normal.   Eyes: EOM are normal. Pupils are equal, round, and reactive to light.   Neck: Normal range of motion. Neck supple.   No lymphadenopathy noted in cervical, infraclavicular, supraclavicular, axillary or groin regions.   Cardiovascular: Normal rate, regular rhythm, normal heart sounds and intact distal pulses.   Pulmonary/Chest: Effort normal and breath sounds normal.   Abdominal: Soft. Bowel sounds are normal.   Musculoskeletal: Normal range of motion.   Lymphadenopathy:     She has no cervical adenopathy.   Neurological: She is alert and oriented to person, place, and time.   Skin: Skin is warm and dry.   Psychiatric: She has a normal mood and affect. Her behavior is normal. Thought content normal.         RECENT LABS:  Hematology WBC   Date Value Ref Range Status   09/17/2019 10.43 3.40 - 10.80 10*3/mm3 Final     RBC   Date Value Ref Range Status   09/17/2019 4.54 3.77 - 5.28 10*6/mm3 Final     Hemoglobin   Date Value Ref Range Status   09/17/2019 11.9 (L) 12.0 - 15.9 g/dL Final     Hematocrit   Date Value Ref Range Status   09/17/2019 39.6 34.0 - 46.6 % Final     Platelets   Date Value Ref Range Status   09/17/2019 343 140 - 450 10*3/mm3 Final      X-RAY CHEST TWO-VIEW 09/17/19  CONCLUSION: No active disease of the chest.     CT CHEST, ABDOMEN, AND PELVIS WITH IV CONTRAST 7/5/19    FINDINGS:  CHEST: In addition to shotty nodes at both axilla, there are a few  mildly enlarged nodes. Two of the largest right axillary nodes both  measure 1.7 x 1.3 cm. The largest left axillary node is best seen on the  coronal reconstruction series measuring 2.0 x 0.9 cm. There is no  mediastinal or hilar lymphadenopathy. An AP window node measures 1.4 x  0.6 cm. There are no pulmonary airspace opacities and there are  no  pleural or pericardial effusions.     ABDOMEN/PELVIS: Splenic size is normal and there is no lymphadenopathy.  There are fairly typical shotty nodes at the terrell hepatis and  mesentery. There is mild fatty infiltration of the liver. The  gallbladder appears unremarkable and there is no biliary dilatation. The  pancreas, adrenals, and kidneys appear unremarkable. No acute bowel  abnormality is seen. The appendix appears normal. There is very mild  sigmoid diverticulosis and no evidence for diverticulitis. The uterus  and adnexa appear unremarkable other than metallic wires within the  fallopian tubes.     IMPRESSION:  1. In addition to shotty nodes at the axilla, there are 2 enlarged right  axillary nodes and there is a borderline enlarged left axillary node.  There is no mediastinal or hilar lymphadenopathy.  2. Splenic size is normal and there is no lymphadenopathy within the  abdomen or pelvis.  3. Very mild sigmoid diverticulosis without evidence for diverticulitis.      Assessment/Plan        49-year-old female with a history of hyperlipidemia, GE reflux, seasonal allergies and morbid obesity recently accessing potential bariatric surgery.  She had undergone routine mammography in late January showing new large lymphadenopathy in both axillae, confirmed on ultrasound and eventual needle biopsy February 18, 2019 showing benign lymphoid tissue.  Approximately at the same point the patient was developing significant anemia with February 18 assessment including H&H of 10.4 and 33.5 MCV not 77.2, MCH of 24.0, normal platelet count normal platelet count, and mild leukocytosis with normal differential.  The patient states that she was placed on oral iron but does not take it routinely and, indeed, her hemoglobin hematocrit are mildly improved.  Her symptoms however described as above are significant with excessive fatigue upon minimal activity, night sweats, temperature intolerance and shortness of breath again  with minimal activity.     We have discussed an assessment to include not only her degree of anemia but also asked whether it is potentially associated with further lymphadenopathy at this point.  Finally she also has symptoms that could also include perimenopause.  As result we proceeded with studies finding her premenopausal and with bilateral axillary adenopathy including 2 enlarged right axillary nodes and a borderline enlarged left axillary node but no other significant findings except for mild sigmoid diverticulosis.   As the patient is reviewed July 11 her iron deficiency is significant enough to suggest additional iron though she is intolerant to oral therapy.  We therefore discussed an IV iron infusion in the next several weeks as she also pursues a bariatric assessment for weight management.  We would additionally follow bilateral axillary ultrasound perhaps in the next 6 months and potential endoscopy.  Patient seen back she was given IV Injectafer and is reassessed September 18 with a mild improvement in hemoglobin hematocrit, no current evidence of iron deficiency but still with some degree of weakness.  Her exam does not reveal any new abnormalities the follow-up of her adenopathy seems prudent and will be assessed.    Plan:                                                         *Repeat CT of chest-11 weeks  *Follow-up appoint with MD in 12 weeks        \

## 2019-09-25 ENCOUNTER — TELEPHONE (OUTPATIENT)
Dept: BARIATRICS/WEIGHT MGMT | Facility: CLINIC | Age: 49
End: 2019-09-25

## 2019-09-25 NOTE — TELEPHONE ENCOUNTER
VANM for pt to call office regarding lab results      ----- Message from MANI Reddy sent at 9/17/2019  2:32 PM EDT -----  TSH was elevated as well as cholesterol.  Please make patient aware and have her follow-up with her primary care

## 2019-10-03 ENCOUNTER — OFFICE VISIT (OUTPATIENT)
Dept: CARDIOLOGY | Facility: CLINIC | Age: 49
End: 2019-10-03

## 2019-10-03 VITALS
HEART RATE: 76 BPM | HEIGHT: 61 IN | WEIGHT: 259 LBS | BODY MASS INDEX: 48.9 KG/M2 | SYSTOLIC BLOOD PRESSURE: 126 MMHG | DIASTOLIC BLOOD PRESSURE: 81 MMHG

## 2019-10-03 DIAGNOSIS — R06.02 SOB (SHORTNESS OF BREATH): Primary | ICD-10-CM

## 2019-10-03 DIAGNOSIS — E78.5 HYPERLIPIDEMIA, UNSPECIFIED HYPERLIPIDEMIA TYPE: ICD-10-CM

## 2019-10-03 DIAGNOSIS — M79.89 LEG SWELLING: ICD-10-CM

## 2019-10-03 PROCEDURE — 99203 OFFICE O/P NEW LOW 30 MIN: CPT | Performed by: INTERNAL MEDICINE

## 2019-10-03 PROCEDURE — 93000 ELECTROCARDIOGRAM COMPLETE: CPT | Performed by: INTERNAL MEDICINE

## 2019-10-03 NOTE — PROGRESS NOTES
Subjective:        [facility]  Cardiology Consult Note    Patient Identification:  Name: Armin Viramontes  Age: 49 y.o.  Sex: female  :  1970  MRN: [id number]             CC  INCREASING SOB    ANKLE SWELLING    History of Present Illness:   49-year-old female here for the cardiac evaluation as well as establishment of the care as the patient is complaining of shortness of breath    Armin Viramontes has been complaining of the shortness of breath mild-to-moderate in intensity with mild-to-moderate usually relieved with rest associated with anxiety and fatigue      Comorbid cardiac risk factors:     Past Medical History:  Past Medical History:   Diagnosis Date   • Anemia    • Arthritis    • GERD (gastroesophageal reflux disease)    • Hair loss    • History of obesity    • HPV in female    • Hyperlipidemia    • Infectious mononucleosis    • Lymphadenopathy 2019    Right axilla   • JAZMYN (obstructive sleep apnea)    • Patient exposure to body fluids    • Seasonal allergies      Past Surgical History:  Past Surgical History:   Procedure Laterality Date   • ESSURE TUBAL LIGATION     • TONSILLECTOMY        Allergies:  Allergies   Allergen Reactions   • Erythromycin    • Onion Photosensitivity   • Vit C-Cholecalciferol-Rochelle Hip [Cholecalciferol-Vitamin C]      Home Meds:    (Not in a hospital admission)  Current Meds:   [unfilled]  Social History:   Social History     Tobacco Use   • Smoking status: Never Smoker   • Smokeless tobacco: Never Used   Substance Use Topics   • Alcohol use: Yes     Frequency: Monthly or less     Comment: social      Family History:  Family History   Problem Relation Age of Onset   • Hypertension Mother    • Hyperlipidemia Mother    • Breast cancer Mother 62        DCIS   • Thyroid disease Mother    • Diabetes Father    • Hyperlipidemia Father    • Heart attack Father    • Stroke Father    • Heart disease Father         CHF   • Obesity Father    • Endometriosis Maternal  Grandmother    • Ovarian cancer Maternal Grandmother 49        gyn ? ovarian   • Osteoporosis Maternal Grandfather         Review of Systems    Constitutional: No weakness,fatigue, fever, rigors, chills   Eyes: No vision changes, eye pain   ENT/oropharynx: No difficulty swallowing, sore throat, epistaxis, changes in hearing   Cardiovascular: No chest pain, chest tightness, palpitations, paroxysmal nocturnal dyspnea, orthopnea, diaphoresis, dizziness / syncopal episode   Respiratory:  Moderate shortness of breath, dyspnea on exertion, cough, wheezing hemoptysis   Gastrointestinal: No abdominal pain, nausea, vomiting, diarrhea, bloody stools   Genitourinary: No hematuria, dysuria   Neurological: No headache, tremors, numbness,  one-sided weakness    Musculoskeletal: No cramps, myalgias,  joint pain, joint swelling   Integument: No rash, edema           Constitutional:  Heart Rate:  [76] 76  BP: (126)/(81) 126/81    Physical Exam   General:  Appears in no acute distress  Eyes: PERTL,  HEENT:  No JVD. Thyroid not visibly enlarged. No mucosal pallor or cyanosis  Respiratory: Respirations regular and unlabored at rest. BBS with good air entry in all fields. No crackles, rubs or wheezes auscultated  Cardiovascular: S1S2 Regular rate and rhythm. No murmur, rub or gallop auscultated. No carotid bruits. DP/PT pulses    . No pretibial pitting edema  Gastrointestinal: Abdomen soft, flat, non tender. Bowel sounds present. No hepatosplenomegaly. No ascites  Musculoskeletal: PICKETT x4. No abnormal movements  Extremities: No digital clubbing or cyanosis  Skin: Color pink. Skin warm and dry to touch. No rashes  No xanthoma  Neuro: AAO x3 CN II-XII grossly intact            ECG 12 Lead  Date/Time: 10/3/2019 2:28 PM  Performed by: Leandra Yanes MD  Authorized by: Leandra Yanes MD   Comparison: not compared with previous ECG   Previous ECG: no previous ECG available  Rhythm: sinus rhythm  ST Flattening: all    Clinical  impression: abnormal EKG                Cardiographics  ECG:     Telemetry:    Echocardiogram:     Imaging  Chest X-ray:     Lab Review               @[email or web address]@              Assessment:/ Recommendations / Plan:   Patient Active Problem List   Diagnosis   • Seasonal allergies   • Hyperlipidemia   • GERD (gastroesophageal reflux disease)   • BMI 45.0-49.9, adult (CMS/HCC)   • HPV in female   • Family history of ovarian cancer   • Lymphadenopathy   • Iron deficiency anemia due to chronic blood loss   • Insomnia   • Chronic fatigue   • JAZMYN (obstructive sleep apnea)   • Multiple joint pain   • Prediabetes                    ICD-10-CM ICD-9-CM   1. SOB (shortness of breath) R06.02 786.05   2. Leg swelling M79.89 729.81   3. Hyperlipidemia, unspecified hyperlipidemia type E78.5 272.4     1. SOB (shortness of breath)  Considering the patient's symptoms as well as clinical situation and  EKG findings, along with cardiac risk factors, ischemic workup is necessary to rule out ischemic cardiomyopathy, stress induced arrhythmias, and functional capacity for diagnosis as well as prognostic consideration    Procedure, risks and options of cardiac cath explained to pt INCLUDING BUT NOT LIMITED TO MI, STROKE, DEATH, INFECTION HAEMORRHAGE, . Pt understands well and agrees with no further questions.  - Stress Test With Myocardial Perfusion One Day  - Adult Transthoracic Echo Complete W/ Cont if Necessary Per Protocol  - Case Request Cath Lab: Right Heart Cath    2. Leg swelling  Armin Viramontes has been complaining of the leg swelling, appears dependent pedal edema, significantly contributed with a venous insufficiency.    Strong recommendations of elevating the legs as well as using support hoses, along with the control of fluids and salt restriction has been explained in details      3. Hyperlipidemia, unspecified hyperlipidemia type  Continue current medication       WALKING MENDOZA, ECHO, RT HEART CAGTH    Labs/tests ordered for  am      Leandra Yanes MD  10/3/2019, 2:27 PM      EMR Dragon/Transcription:   Dictated utilizing Dragon dictation

## 2019-10-09 PROBLEM — M79.89 LEG SWELLING: Status: ACTIVE | Noted: 2019-10-09

## 2019-10-09 PROBLEM — R06.02 SOB (SHORTNESS OF BREATH): Status: ACTIVE | Noted: 2019-10-09

## 2019-10-14 ENCOUNTER — TELEPHONE (OUTPATIENT)
Dept: CARDIOLOGY | Facility: CLINIC | Age: 49
End: 2019-10-14

## 2019-10-31 ENCOUNTER — HOSPITAL ENCOUNTER (OUTPATIENT)
Dept: CARDIOLOGY | Facility: HOSPITAL | Age: 49
Discharge: HOME | End: 2019-10-31

## 2019-10-31 ENCOUNTER — HOSPITAL ENCOUNTER (OUTPATIENT)
Dept: CARDIOLOGY | Facility: HOSPITAL | Age: 49
Discharge: HOME | End: 2019-10-31
Admitting: INTERNAL MEDICINE

## 2019-10-31 VITALS
HEIGHT: 61 IN | HEART RATE: 74 BPM | SYSTOLIC BLOOD PRESSURE: 114 MMHG | BODY MASS INDEX: 48.79 KG/M2 | DIASTOLIC BLOOD PRESSURE: 68 MMHG | OXYGEN SATURATION: 97 % | WEIGHT: 258.4 LBS

## 2019-10-31 PROBLEM — R07.89 CHEST PAIN, ATYPICAL: Status: ACTIVE | Noted: 2019-10-31

## 2019-10-31 LAB
BH CV STRESS BP STAGE 1: NORMAL
BH CV STRESS BP STAGE 2: NORMAL
BH CV STRESS DURATION MIN STAGE 1: 3
BH CV STRESS DURATION MIN STAGE 2: 3
BH CV STRESS DURATION SEC STAGE 1: 30
BH CV STRESS DURATION SEC STAGE 2: 2
BH CV STRESS GRADE STAGE 1: 10
BH CV STRESS GRADE STAGE 2: 12
BH CV STRESS HR STAGE 1: 133
BH CV STRESS HR STAGE 2: 153
BH CV STRESS METS STAGE 1: 4.6
BH CV STRESS METS STAGE 2: 6.8
BH CV STRESS O2 STAGE 1: 97
BH CV STRESS O2 STAGE 2: 97
BH CV STRESS PROTOCOL 1: NORMAL
BH CV STRESS RECOVERY BP: NORMAL MMHG
BH CV STRESS RECOVERY HR: 86 BPM
BH CV STRESS RECOVERY O2: 99 %
BH CV STRESS SPEED STAGE 1: 1.7
BH CV STRESS SPEED STAGE 2: 2.4
BH CV STRESS STAGE 1: 1
BH CV STRESS STAGE 2: 2
LV EF NUC BP: 60 %
MAXIMAL PREDICTED HEART RATE: 173 BPM
PERCENT MAX PREDICTED HR: 88.44 %
STRESS BASELINE BP: NORMAL MMHG
STRESS BASELINE HR: 83 BPM
STRESS O2 SAT REST: 99 %
STRESS PERCENT HR: 104 %
STRESS POST ESTIMATED WORKLOAD: 6.8 METS
STRESS POST EXERCISE DUR MIN: 6 MIN
STRESS POST EXERCISE DUR SEC: 32 SEC
STRESS POST O2 SAT PEAK: 97 %
STRESS POST PEAK BP: NORMAL MMHG
STRESS POST PEAK HR: 153 BPM
STRESS TARGET HR: 147 BPM

## 2019-10-31 PROCEDURE — 78452 HT MUSCLE IMAGE SPECT MULT: CPT

## 2019-10-31 PROCEDURE — 93306 TTE W/DOPPLER COMPLETE: CPT

## 2019-10-31 PROCEDURE — 93016 CV STRESS TEST SUPVJ ONLY: CPT | Performed by: INTERNAL MEDICINE

## 2019-10-31 PROCEDURE — 93018 CV STRESS TEST I&R ONLY: CPT | Performed by: INTERNAL MEDICINE

## 2019-10-31 PROCEDURE — 0 TECHNETIUM SESTAMIBI: Performed by: INTERNAL MEDICINE

## 2019-10-31 PROCEDURE — 93017 CV STRESS TEST TRACING ONLY: CPT

## 2019-10-31 PROCEDURE — A9500 TC99M SESTAMIBI: HCPCS | Performed by: INTERNAL MEDICINE

## 2019-10-31 PROCEDURE — 78452 HT MUSCLE IMAGE SPECT MULT: CPT | Performed by: INTERNAL MEDICINE

## 2019-10-31 PROCEDURE — 93306 TTE W/DOPPLER COMPLETE: CPT | Performed by: INTERNAL MEDICINE

## 2019-10-31 RX ADMIN — TECHNETIUM TC 99M SESTAMIBI 1 DOSE: 1 INJECTION INTRAVENOUS at 08:26

## 2019-10-31 RX ADMIN — TECHNETIUM TC 99M SESTAMIBI 1 DOSE: 1 INJECTION INTRAVENOUS at 10:51

## 2019-11-01 LAB
BH CV ECHO MEAS - ACS: 1.8 CM
BH CV ECHO MEAS - AO MAX PG (FULL): 6 MMHG
BH CV ECHO MEAS - AO MAX PG: 12.7 MMHG
BH CV ECHO MEAS - AO MEAN PG (FULL): 3 MMHG
BH CV ECHO MEAS - AO MEAN PG: 7 MMHG
BH CV ECHO MEAS - AO ROOT AREA (BSA CORRECTED): 1.4
BH CV ECHO MEAS - AO ROOT AREA: 7.1 CM^2
BH CV ECHO MEAS - AO ROOT DIAM: 3 CM
BH CV ECHO MEAS - AO V2 MAX: 178 CM/SEC
BH CV ECHO MEAS - AO V2 MEAN: 122 CM/SEC
BH CV ECHO MEAS - AO V2 VTI: 36.9 CM
BH CV ECHO MEAS - AVA(I,A): 2.4 CM^2
BH CV ECHO MEAS - AVA(I,D): 2.4 CM^2
BH CV ECHO MEAS - AVA(V,A): 2.3 CM^2
BH CV ECHO MEAS - AVA(V,D): 2.3 CM^2
BH CV ECHO MEAS - BSA(HAYCOCK): 2.3 M^2
BH CV ECHO MEAS - BSA: 2.1 M^2
BH CV ECHO MEAS - BZI_BMI: 48.7 KILOGRAMS/M^2
BH CV ECHO MEAS - BZI_METRIC_HEIGHT: 154.9 CM
BH CV ECHO MEAS - BZI_METRIC_WEIGHT: 117 KG
BH CV ECHO MEAS - EDV(CUBED): 85.2 ML
BH CV ECHO MEAS - EDV(MOD-SP2): 87 ML
BH CV ECHO MEAS - EDV(MOD-SP4): 102 ML
BH CV ECHO MEAS - EDV(TEICH): 87.7 ML
BH CV ECHO MEAS - EF(CUBED): 81.7 %
BH CV ECHO MEAS - EF(MOD-BP): 68 %
BH CV ECHO MEAS - EF(MOD-SP2): 64.4 %
BH CV ECHO MEAS - EF(MOD-SP4): 65.7 %
BH CV ECHO MEAS - EF(TEICH): 74.5 %
BH CV ECHO MEAS - ESV(CUBED): 15.6 ML
BH CV ECHO MEAS - ESV(MOD-SP2): 31 ML
BH CV ECHO MEAS - ESV(MOD-SP4): 35 ML
BH CV ECHO MEAS - ESV(TEICH): 22.3 ML
BH CV ECHO MEAS - FS: 43.2 %
BH CV ECHO MEAS - IVS/LVPW: 0.92
BH CV ECHO MEAS - IVSD: 1.1 CM
BH CV ECHO MEAS - LA DIMENSION: 4.9 CM
BH CV ECHO MEAS - LA/AO: 1.6
BH CV ECHO MEAS - LAT PEAK E' VEL: 12.3 CM/SEC
BH CV ECHO MEAS - LV DIASTOLIC VOL/BSA (35-75): 48.5 ML/M^2
BH CV ECHO MEAS - LV MASS(C)D: 180 GRAMS
BH CV ECHO MEAS - LV MASS(C)DI: 85.5 GRAMS/M^2
BH CV ECHO MEAS - LV MAX PG: 6.7 MMHG
BH CV ECHO MEAS - LV MEAN PG: 4 MMHG
BH CV ECHO MEAS - LV SYSTOLIC VOL/BSA (12-30): 16.6 ML/M^2
BH CV ECHO MEAS - LV V1 MAX: 129 CM/SEC
BH CV ECHO MEAS - LV V1 MEAN: 93.5 CM/SEC
BH CV ECHO MEAS - LV V1 VTI: 28.4 CM
BH CV ECHO MEAS - LVIDD: 4.4 CM
BH CV ECHO MEAS - LVIDS: 2.5 CM
BH CV ECHO MEAS - LVLD AP2: 7 CM
BH CV ECHO MEAS - LVLD AP4: 8.2 CM
BH CV ECHO MEAS - LVLS AP2: 6.3 CM
BH CV ECHO MEAS - LVLS AP4: 6.2 CM
BH CV ECHO MEAS - LVOT AREA (M): 3.1 CM^2
BH CV ECHO MEAS - LVOT AREA: 3.1 CM^2
BH CV ECHO MEAS - LVOT DIAM: 2 CM
BH CV ECHO MEAS - LVPWD: 1.2 CM
BH CV ECHO MEAS - MED PEAK E' VEL: 8.6 CM/SEC
BH CV ECHO MEAS - MV A DUR: 0.19 SEC
BH CV ECHO MEAS - MV A MAX VEL: 96 CM/SEC
BH CV ECHO MEAS - MV DEC SLOPE: 219 CM/SEC^2
BH CV ECHO MEAS - MV DEC TIME: 0.18 SEC
BH CV ECHO MEAS - MV E MAX VEL: 88.3 CM/SEC
BH CV ECHO MEAS - MV E/A: 0.92
BH CV ECHO MEAS - MV MAX PG: 2.5 MMHG
BH CV ECHO MEAS - MV MEAN PG: 1 MMHG
BH CV ECHO MEAS - MV P1/2T MAX VEL: 69.5 CM/SEC
BH CV ECHO MEAS - MV P1/2T: 93 MSEC
BH CV ECHO MEAS - MV V2 MAX: 78.7 CM/SEC
BH CV ECHO MEAS - MV V2 MEAN: 52.8 CM/SEC
BH CV ECHO MEAS - MV V2 VTI: 20 CM
BH CV ECHO MEAS - MVA P1/2T LCG: 3.2 CM^2
BH CV ECHO MEAS - MVA(P1/2T): 2.4 CM^2
BH CV ECHO MEAS - MVA(VTI): 4.5 CM^2
BH CV ECHO MEAS - PA ACC TIME: 0.14 SEC
BH CV ECHO MEAS - PA MAX PG (FULL): 5.2 MMHG
BH CV ECHO MEAS - PA MAX PG: 7.5 MMHG
BH CV ECHO MEAS - PA PR(ACCEL): 16 MMHG
BH CV ECHO MEAS - PA V2 MAX: 137 CM/SEC
BH CV ECHO MEAS - PULM A REVS DUR: 0.15 SEC
BH CV ECHO MEAS - PULM A REVS VEL: 29.3 CM/SEC
BH CV ECHO MEAS - PULM DIAS VEL: 36.1 CM/SEC
BH CV ECHO MEAS - PULM S/D: 1.3
BH CV ECHO MEAS - PULM SYS VEL: 47.8 CM/SEC
BH CV ECHO MEAS - PVA(V,A): 2.7 CM^2
BH CV ECHO MEAS - PVA(V,D): 2.7 CM^2
BH CV ECHO MEAS - QP/QS: 0.87
BH CV ECHO MEAS - RAP SYSTOLE: 3 MMHG
BH CV ECHO MEAS - RV MAX PG: 2.3 MMHG
BH CV ECHO MEAS - RV MEAN PG: 1 MMHG
BH CV ECHO MEAS - RV V1 MAX: 76.1 CM/SEC
BH CV ECHO MEAS - RV V1 MEAN: 51.9 CM/SEC
BH CV ECHO MEAS - RV V1 VTI: 15.9 CM
BH CV ECHO MEAS - RVOT AREA: 4.9 CM^2
BH CV ECHO MEAS - RVOT DIAM: 2.5 CM
BH CV ECHO MEAS - RVSP: 20.6 MMHG
BH CV ECHO MEAS - SI(AO): 123.9 ML/M^2
BH CV ECHO MEAS - SI(CUBED): 33 ML/M^2
BH CV ECHO MEAS - SI(LVOT): 42.4 ML/M^2
BH CV ECHO MEAS - SI(MOD-SP2): 26.6 ML/M^2
BH CV ECHO MEAS - SI(MOD-SP4): 31.8 ML/M^2
BH CV ECHO MEAS - SI(TEICH): 31.1 ML/M^2
BH CV ECHO MEAS - SV(AO): 260.8 ML
BH CV ECHO MEAS - SV(CUBED): 69.6 ML
BH CV ECHO MEAS - SV(LVOT): 89.2 ML
BH CV ECHO MEAS - SV(MOD-SP2): 56 ML
BH CV ECHO MEAS - SV(MOD-SP4): 67 ML
BH CV ECHO MEAS - SV(RVOT): 78 ML
BH CV ECHO MEAS - SV(TEICH): 65.4 ML
BH CV ECHO MEAS - TAPSE (>1.6): 2.4 CM
BH CV ECHO MEAS - TR MAX VEL: 210 CM/SEC
BH CV ECHO MEASUREMENTS AVERAGE E/E' RATIO: 8.45
BH CV XLRA - RV BASE: 3.1 CM
BH CV XLRA - RV LENGTH: 7.3 CM
BH CV XLRA - RV MID: 2.2 CM
BH CV XLRA - TDI S': 12.6 CM/SEC
LEFT ATRIUM VOLUME INDEX: 26 ML/M2
MAXIMAL PREDICTED HEART RATE: 173 BPM
STRESS TARGET HR: 147 BPM

## 2019-11-13 ENCOUNTER — HOSPITAL ENCOUNTER (OUTPATIENT)
Dept: CARDIOLOGY | Facility: HOSPITAL | Age: 49
Discharge: HOME | End: 2019-11-13
Admitting: INTERNAL MEDICINE

## 2019-11-13 DIAGNOSIS — R07.89 CHEST PAIN, ATYPICAL: ICD-10-CM

## 2019-11-13 DIAGNOSIS — Z01.818 PRE-OP TESTING: ICD-10-CM

## 2019-11-13 DIAGNOSIS — Z01.818 PRE-OP TESTING: Primary | ICD-10-CM

## 2019-11-13 LAB
ANION GAP SERPL CALCULATED.3IONS-SCNC: 8.5 MMOL/L (ref 5–15)
APTT PPP: 32.1 SECONDS (ref 22.7–35.4)
BASOPHILS # BLD AUTO: 0.04 10*3/MM3 (ref 0–0.2)
BASOPHILS NFR BLD AUTO: 0.3 % (ref 0–1.5)
BUN BLD-MCNC: 18 MG/DL (ref 6–20)
BUN/CREAT SERPL: 21.4 (ref 7–25)
CALCIUM SPEC-SCNC: 9.6 MG/DL (ref 8.6–10.5)
CHLORIDE SERPL-SCNC: 99 MMOL/L (ref 98–107)
CO2 SERPL-SCNC: 30.5 MMOL/L (ref 22–29)
CREAT BLD-MCNC: 0.84 MG/DL (ref 0.57–1)
DEPRECATED RDW RBC AUTO: 46.5 FL (ref 37–54)
EOSINOPHIL # BLD AUTO: 0.35 10*3/MM3 (ref 0–0.4)
EOSINOPHIL NFR BLD AUTO: 2.8 % (ref 0.3–6.2)
ERYTHROCYTE [DISTWIDTH] IN BLOOD BY AUTOMATED COUNT: 14.7 % (ref 12.3–15.4)
GFR SERPL CREATININE-BSD FRML MDRD: 73 ML/MIN/1.73
GLUCOSE BLD-MCNC: 75 MG/DL (ref 65–99)
HCT VFR BLD AUTO: 41.8 % (ref 34–46.6)
HGB BLD-MCNC: 13.1 G/DL (ref 12–15.9)
IMM GRANULOCYTES # BLD AUTO: 0.06 10*3/MM3 (ref 0–0.05)
IMM GRANULOCYTES NFR BLD AUTO: 0.5 % (ref 0–0.5)
INR PPP: 0.96 (ref 0.9–1.1)
LYMPHOCYTES # BLD AUTO: 2.97 10*3/MM3 (ref 0.7–3.1)
LYMPHOCYTES NFR BLD AUTO: 23.6 % (ref 19.6–45.3)
MCH RBC QN AUTO: 26.8 PG (ref 26.6–33)
MCHC RBC AUTO-ENTMCNC: 31.3 G/DL (ref 31.5–35.7)
MCV RBC AUTO: 85.5 FL (ref 79–97)
MONOCYTES # BLD AUTO: 0.68 10*3/MM3 (ref 0.1–0.9)
MONOCYTES NFR BLD AUTO: 5.4 % (ref 5–12)
NEUTROPHILS # BLD AUTO: 8.47 10*3/MM3 (ref 1.7–7)
NEUTROPHILS NFR BLD AUTO: 67.4 % (ref 42.7–76)
NRBC BLD AUTO-RTO: 0 /100 WBC (ref 0–0.2)
PLATELET # BLD AUTO: 403 10*3/MM3 (ref 140–450)
PMV BLD AUTO: 10.1 FL (ref 6–12)
POTASSIUM BLD-SCNC: 4.6 MMOL/L (ref 3.5–5.2)
PROTHROMBIN TIME: 12.5 SECONDS (ref 11.7–14.2)
RBC # BLD AUTO: 4.89 10*6/MM3 (ref 3.77–5.28)
SODIUM BLD-SCNC: 138 MMOL/L (ref 136–145)
WBC NRBC COR # BLD: 12.57 10*3/MM3 (ref 3.4–10.8)

## 2019-11-13 PROCEDURE — 80048 BASIC METABOLIC PNL TOTAL CA: CPT | Performed by: INTERNAL MEDICINE

## 2019-11-13 PROCEDURE — 85610 PROTHROMBIN TIME: CPT | Performed by: INTERNAL MEDICINE

## 2019-11-13 PROCEDURE — 36415 COLL VENOUS BLD VENIPUNCTURE: CPT | Performed by: INTERNAL MEDICINE

## 2019-11-13 PROCEDURE — 85025 COMPLETE CBC W/AUTO DIFF WBC: CPT | Performed by: INTERNAL MEDICINE

## 2019-11-13 PROCEDURE — 85730 THROMBOPLASTIN TIME PARTIAL: CPT | Performed by: INTERNAL MEDICINE

## 2019-11-15 ENCOUNTER — HOSPITAL ENCOUNTER (OUTPATIENT)
Facility: HOSPITAL | Age: 49
Setting detail: HOSPITAL OUTPATIENT SURGERY
Discharge: HOME | End: 2019-11-15
Attending: INTERNAL MEDICINE | Admitting: INTERNAL MEDICINE

## 2019-11-15 VITALS
TEMPERATURE: 98 F | HEART RATE: 71 BPM | WEIGHT: 253.13 LBS | DIASTOLIC BLOOD PRESSURE: 77 MMHG | RESPIRATION RATE: 18 BRPM | HEIGHT: 61 IN | BODY MASS INDEX: 47.79 KG/M2 | OXYGEN SATURATION: 99 % | SYSTOLIC BLOOD PRESSURE: 133 MMHG

## 2019-11-15 DIAGNOSIS — R07.89 CHEST PAIN, ATYPICAL: ICD-10-CM

## 2019-11-15 LAB
HCT VFR BLDA CALC: 34 % (ref 38–51)
HCT VFR BLDA CALC: 35 % (ref 38–51)
HCT VFR BLDA CALC: 35 % (ref 38–51)
HGB BLDA-MCNC: 11.6 G/DL (ref 12–17)
HGB BLDA-MCNC: 11.9 G/DL (ref 12–17)
HGB BLDA-MCNC: 11.9 G/DL (ref 12–17)
SAO2 % BLDA: 73 % (ref 95–98)
SAO2 % BLDA: 73 % (ref 95–98)
SAO2 % BLDA: 96 % (ref 95–98)

## 2019-11-15 PROCEDURE — 85014 HEMATOCRIT: CPT

## 2019-11-15 PROCEDURE — C1894 INTRO/SHEATH, NON-LASER: HCPCS | Performed by: INTERNAL MEDICINE

## 2019-11-15 PROCEDURE — 93460 R&L HRT ART/VENTRICLE ANGIO: CPT | Performed by: INTERNAL MEDICINE

## 2019-11-15 PROCEDURE — 0 IOPAMIDOL PER 1 ML: Performed by: INTERNAL MEDICINE

## 2019-11-15 PROCEDURE — 25010000002 FENTANYL CITRATE (PF) 100 MCG/2ML SOLUTION: Performed by: INTERNAL MEDICINE

## 2019-11-15 PROCEDURE — C1769 GUIDE WIRE: HCPCS | Performed by: INTERNAL MEDICINE

## 2019-11-15 PROCEDURE — 25010000002 MIDAZOLAM PER 1 MG: Performed by: INTERNAL MEDICINE

## 2019-11-15 PROCEDURE — 85018 HEMOGLOBIN: CPT

## 2019-11-15 PROCEDURE — 25010000002 HEPARIN (PORCINE) PER 1000 UNITS: Performed by: INTERNAL MEDICINE

## 2019-11-15 RX ORDER — SODIUM CHLORIDE 0.9 % (FLUSH) 0.9 %
3 SYRINGE (ML) INJECTION EVERY 12 HOURS SCHEDULED
Status: DISCONTINUED | OUTPATIENT
Start: 2019-11-15 | End: 2019-11-15 | Stop reason: HOSPADM

## 2019-11-15 RX ORDER — MIDAZOLAM HYDROCHLORIDE 1 MG/ML
INJECTION INTRAMUSCULAR; INTRAVENOUS AS NEEDED
Status: DISCONTINUED | OUTPATIENT
Start: 2019-11-15 | End: 2019-11-15 | Stop reason: HOSPADM

## 2019-11-15 RX ORDER — FENTANYL CITRATE 50 UG/ML
INJECTION, SOLUTION INTRAMUSCULAR; INTRAVENOUS AS NEEDED
Status: DISCONTINUED | OUTPATIENT
Start: 2019-11-15 | End: 2019-11-15 | Stop reason: HOSPADM

## 2019-11-15 RX ORDER — LIDOCAINE HYDROCHLORIDE 10 MG/ML
0.1 INJECTION, SOLUTION EPIDURAL; INFILTRATION; INTRACAUDAL; PERINEURAL ONCE AS NEEDED
Status: DISCONTINUED | OUTPATIENT
Start: 2019-11-15 | End: 2019-11-15 | Stop reason: HOSPADM

## 2019-11-15 RX ORDER — LIDOCAINE HYDROCHLORIDE 20 MG/ML
INJECTION, SOLUTION INFILTRATION; PERINEURAL AS NEEDED
Status: DISCONTINUED | OUTPATIENT
Start: 2019-11-15 | End: 2019-11-15 | Stop reason: HOSPADM

## 2019-11-15 RX ORDER — SODIUM CHLORIDE 9 MG/ML
100 INJECTION, SOLUTION INTRAVENOUS CONTINUOUS
Status: DISCONTINUED | OUTPATIENT
Start: 2019-11-15 | End: 2019-11-15 | Stop reason: HOSPADM

## 2019-11-15 RX ORDER — SODIUM CHLORIDE 9 MG/ML
75 INJECTION, SOLUTION INTRAVENOUS CONTINUOUS
Status: DISCONTINUED | OUTPATIENT
Start: 2019-11-15 | End: 2019-11-15 | Stop reason: HOSPADM

## 2019-11-15 RX ORDER — SODIUM CHLORIDE 0.9 % (FLUSH) 0.9 %
10 SYRINGE (ML) INJECTION AS NEEDED
Status: DISCONTINUED | OUTPATIENT
Start: 2019-11-15 | End: 2019-11-15 | Stop reason: HOSPADM

## 2019-11-15 RX ADMIN — SODIUM CHLORIDE 75 ML/HR: 9 INJECTION, SOLUTION INTRAVENOUS at 08:26

## 2019-11-15 NOTE — H&P
[facility]  Cardiology Consult Note     Patient Identification:  Name: Armin Viramontes  Age: 49 y.o.  Sex: female  :  1970  MRN: [id number]              CC  INCREASING SOB     ANKLE SWELLING     History of Present Illness:   49-year-old female here for the cardiac evaluation as well as establishment of the care as the patient is complaining of shortness of breath     Armin Viramontes has been complaining of the shortness of breath mild-to-moderate in intensity with mild-to-moderate usually relieved with rest associated with anxiety and fatigue        Comorbid cardiac risk factors:      Past Medical History:  Medical History        Past Medical History:   Diagnosis Date   • Anemia     • Arthritis     • GERD (gastroesophageal reflux disease)     • Hair loss     • History of obesity     • HPV in female    • Hyperlipidemia     • Infectious mononucleosis     • Lymphadenopathy 2019     Right axilla   • JAZMYN (obstructive sleep apnea)     • Patient exposure to body fluids     • Seasonal allergies           Past Surgical History:  Surgical History   Past Surgical History:   Procedure Laterality Date   • ESSURE TUBAL LIGATION       • TONSILLECTOMY             Allergies:       Allergies   Allergen Reactions   • Erythromycin     • Onion Photosensitivity   • Vit C-Cholecalciferol-Rochelle Hip [Cholecalciferol-Vitamin C]        Home Meds:    Prescriptions Prior to Admission      (Not in a hospital admission)     Current Meds:   [unfilled]  Social History:   Social History            Tobacco Use   • Smoking status: Never Smoker   • Smokeless tobacco: Never Used   Substance Use Topics   • Alcohol use: Yes       Frequency: Monthly or less       Comment: social      Family History:        Family History   Problem Relation Age of Onset   • Hypertension Mother     • Hyperlipidemia Mother     • Breast cancer Mother 62         DCIS   • Thyroid disease Mother     • Diabetes Father     • Hyperlipidemia Father     •  Heart attack Father     • Stroke Father     • Heart disease Father           CHF   • Obesity Father     • Endometriosis Maternal Grandmother     • Ovarian cancer Maternal Grandmother 49         gyn ? ovarian   • Osteoporosis Maternal Grandfather           Review of Systems     Constitutional: No weakness,fatigue, fever, rigors, chills   Eyes: No vision changes, eye pain   ENT/oropharynx: No difficulty swallowing, sore throat, epistaxis, changes in hearing   Cardiovascular: No chest pain, chest tightness, palpitations, paroxysmal nocturnal dyspnea, orthopnea, diaphoresis, dizziness / syncopal episode   Respiratory:  Moderate shortness of breath, dyspnea on exertion, cough, wheezing hemoptysis   Gastrointestinal: No abdominal pain, nausea, vomiting, diarrhea, bloody stools   Genitourinary: No hematuria, dysuria   Neurological: No headache, tremors, numbness,  one-sided weakness    Musculoskeletal: No cramps, myalgias,  joint pain, joint swelling   Integument: No rash, edema               Constitutional:  Heart Rate:  [76] 76  BP: (126)/(81) 126/81     Physical Exam   General:  Appears in no acute distress  Eyes: PERTL,  HEENT:  No JVD. Thyroid not visibly enlarged. No mucosal pallor or cyanosis  Respiratory: Respirations regular and unlabored at rest. BBS with good air entry in all fields. No crackles, rubs or wheezes auscultated  Cardiovascular: S1S2 Regular rate and rhythm. No murmur, rub or gallop auscultated. No carotid bruits. DP/PT pulses    . No pretibial pitting edema  Gastrointestinal: Abdomen soft, flat, non tender. Bowel sounds present. No hepatosplenomegaly. No ascites  Musculoskeletal: PICKETT x4. No abnormal movements  Extremities: No digital clubbing or cyanosis  Skin: Color pink. Skin warm and dry to touch. No rashes  No xanthoma  Neuro: AAO x3 CN II-XII grossly intact                 ECG 12 Lead  Date/Time: 10/3/2019 2:28 PM  Performed by: Leandra Yanes MD  Authorized by: Leandra Yanes,  MD   Comparison: not compared with previous ECG   Previous ECG: no previous ECG available  Rhythm: sinus rhythm  ST Flattening: all    Clinical impression: abnormal EKG                     Cardiographics  ECG:      Telemetry:     Echocardiogram:      Imaging  Chest X-ray:      Lab Review               @[email or web address]@                Assessment:/ Recommendations / Plan:       Patient Active Problem List   Diagnosis   • Seasonal allergies   • Hyperlipidemia   • GERD (gastroesophageal reflux disease)   • BMI 45.0-49.9, adult (CMS/HCC)   • HPV in female   • Family history of ovarian cancer   • Lymphadenopathy   • Iron deficiency anemia due to chronic blood loss   • Insomnia   • Chronic fatigue   • JAZMYN (obstructive sleep apnea)   • Multiple joint pain   • Prediabetes                   Plan           ICD-10-CM ICD-9-CM   1. SOB (shortness of breath) R06.02 786.05   2. Leg swelling M79.89 729.81   3. Hyperlipidemia, unspecified hyperlipidemia type E78.5 272.4      1. SOB (shortness of breath)  Considering the patient's symptoms as well as clinical situation and  EKG findings, along with cardiac risk factors, ischemic workup is necessary to rule out ischemic cardiomyopathy, stress induced arrhythmias, and functional capacity for diagnosis as well as prognostic consideration     Procedure, risks and options of cardiac cath explained to pt INCLUDING BUT NOT LIMITED TO MI, STROKE, DEATH, INFECTION HAEMORRHAGE, . Pt understands well and agrees with no further questions.  - Stress Test With Myocardial Perfusion One Day  - Adult Transthoracic Echo Complete W/ Cont if Necessary Per Protocol  - Case Request Cath Lab: Right Heart Cath     2. Leg swelling  Armin Viramontes has been complaining of the leg swelling, appears dependent pedal edema, significantly contributed with a venous insufficiency.     Strong recommendations of elevating the legs as well as using support hoses, along with the control of fluids and salt restriction has  been explained in details        3. Hyperlipidemia, unspecified hyperlipidemia type  Continue current medication        WALKING MENDOZA, ECHO, RT HEART CAGTH     Labs/tests ordered for am        Leandra Yanes MD  10/3/2019, 2:27 PM        EMR Dragon/Transcription:   Dictated utilizing Dragon dictation                Office Visit on 10/3/2019          Detailed Report

## 2019-11-15 NOTE — DISCHARGE INSTRUCTIONS
[facility]  [address] [place] [address]    Coronary Angiogram (Radial/Ulnar Approach) After Care    Refer to this sheet in the next few weeks. These instructions provide you with information on caring for yourself after your procedure. Your caregiver may also give you more specific instructions. Your treatment has been planned according to current medical practices, but problems sometimes occur. Call your caregiver if you have any problems or questions after your procedure.    Home Care Instructions:  · You may shower the day after the procedure. Remove the bandage (dressing) and gently wash the site with plain soap and water. Gently pat the site dry. You may apply a band aid daily for 2 days if desired.    · Do not apply powder or lotion to the site.  · Do not submerge the affected site in water for 3 to 5 days or until the site is completely healed.   · Do not lift, push or pull anything over 10 pounds for 2 days after your procedure.  · Inspect the site at least twice daily. You may notice some bruising at the site and it may be tender for 1 to 2 weeks.     · Increase your fluid intake for the next 2 days.    · Keep arm elevated for 24 hours. For the remainder of the day, keep your arm in “Pledge of Allegiance” position when up and about.     · You may drive 24 hours after the procedure unless otherwise instructed by your caregiver.  · Do not operate machinery or power tools for 24 hours.  · A responsible adult should be with you for the first 24 hours after you arrive home. Do not make any important legal decisions or sign legal papers for 24 hours.  Do not drink alcohol for 24 hours.    · Metformin or any medications containing Metformin should not be taken for 48 hours after your procedure.      Call Your Doctor if:   · You have unusual pain at the radial/ulnar (wrist) site.  · You have redness, warmth, swelling, or pain at the radial/ulnar (wrist) site.  · You have drainage (other  than a small amount of blood on the dressing).  · You have chills or a fever > 101.  · Your arm becomes pale or dark, cool, tingly, or numb.  · You have heavy bleeding from the site, hold pressure on the site for 20 minutes.  If the bleeding stops, apply a fresh bandage and call your cardiologist.  However, if you continue to have bleeding, call 911.

## 2019-11-15 NOTE — CONSULTS
Staff has reviewed chart and patient does not have a qualifying diagnosis for Phase II Cardiac Rehab at this time.  Staff available if further consultation is needed.

## 2019-11-18 ENCOUNTER — TELEPHONE (OUTPATIENT)
Dept: ONCOLOGY | Facility: CLINIC | Age: 49
End: 2019-11-18

## 2019-11-22 ENCOUNTER — OFFICE VISIT (OUTPATIENT)
Dept: CARDIOLOGY | Facility: CLINIC | Age: 49
End: 2019-11-22

## 2019-11-22 VITALS
HEIGHT: 61 IN | SYSTOLIC BLOOD PRESSURE: 136 MMHG | WEIGHT: 259 LBS | HEART RATE: 69 BPM | DIASTOLIC BLOOD PRESSURE: 79 MMHG | BODY MASS INDEX: 48.9 KG/M2

## 2019-11-22 DIAGNOSIS — R00.2 PALPITATIONS: Primary | ICD-10-CM

## 2019-11-22 DIAGNOSIS — Z98.890 STATUS POST CARDIAC CATHETERIZATION: ICD-10-CM

## 2019-11-22 DIAGNOSIS — E78.5 HYPERLIPIDEMIA, UNSPECIFIED HYPERLIPIDEMIA TYPE: ICD-10-CM

## 2019-11-22 DIAGNOSIS — R06.02 SOB (SHORTNESS OF BREATH): ICD-10-CM

## 2019-11-22 PROCEDURE — 99213 OFFICE O/P EST LOW 20 MIN: CPT | Performed by: NURSE PRACTITIONER

## 2019-11-22 NOTE — PROGRESS NOTES
Subjective:        Armin Viramontes is a 49 y.o. female who here for follow up    Chief Complaint   Patient presents with   • Follow-up     CATH FOLLOW UP       HPI  Armin Viramontes is a 49-year-old female, new to this provider.  She has a history of anemia, arthritis, GERD, hyperlipidemia, and JAZMYN.  She recently was admitted for a cardiac cath due to to chest pain. On 11/15/2019 he had an echo which showed 68%, no evidence of pericardial effusion.  Cardiac cath on 11/15/2019 which showed normal coronaries.    Denies chest pain, syncope and near syncope.  She states she still has some shortness of breath.      The following portions of the patient's history were reviewed and updated as appropriate: allergies, current medications, past family history, past medical history, past social history, past surgical history and problem list.    Past Medical History:   Diagnosis Date   • Anemia    • Arthritis    • GERD (gastroesophageal reflux disease)    • Hair loss    • History of obesity    • HPV in female 2012   • Hyperlipidemia    • Infectious mononucleosis    • Lymphadenopathy 02/2019    Right axilla   • JAZMYN (obstructive sleep apnea)    • Patient exposure to body fluids    • Plantar fasciitis, bilateral    • Seasonal allergies          reports that she has never smoked. She has never used smokeless tobacco. She reports that she drinks alcohol. She reports that she does not use drugs.     Family History   Problem Relation Age of Onset   • Hypertension Mother    • Hyperlipidemia Mother    • Breast cancer Mother 62        DCIS   • Thyroid disease Mother    • Diabetes Father    • Hyperlipidemia Father    • Heart attack Father    • Stroke Father    • Heart disease Father         CHF   • Obesity Father    • Endometriosis Maternal Grandmother    • Ovarian cancer Maternal Grandmother 49        gyn ? ovarian   • Osteoporosis Maternal Grandfather        ROS     Review of Systems  Constitutional: No weight loss, fever, fatigue    gastrointestinal: No nausea, abdominal pain   behavioral/Psych: No insomnia or anxiety  Cardiovascular: Denies chest pain, palpitations.  She states she is still having some shortness of breath.      Objective:           Physical Exam   Constitutional: She is oriented to person, place, and time. She appears well-developed and well-nourished.   HENT:   Head: Normocephalic.   Right Ear: External ear normal.   Left Ear: External ear normal.   Eyes: EOM are normal.   Neck: Normal range of motion. No JVD present.   Cardiovascular: Normal rate, regular rhythm, normal heart sounds and intact distal pulses. Exam reveals no gallop and no friction rub.   No murmur heard.  Pulmonary/Chest: Effort normal and breath sounds normal. No stridor. No respiratory distress. She has no rales.   Abdominal: Soft. Bowel sounds are normal. She exhibits no distension. There is no tenderness. There is no guarding.   Musculoskeletal: Normal range of motion. She exhibits no edema or tenderness.   Neurological: She is alert and oriented to person, place, and time. She has normal reflexes.   Skin: Skin is warm.   Right radial site shows no signs or symptoms of infection, and no hematoma noted.   Psychiatric: She has a normal mood and affect. Judgment normal.   Nursing note and vitals reviewed.      Procedures     Conclusion        · Successful right and left coronary angiogram and LV gram  · Successful right heart catheter  · Normal right-sided pressures  · Normal coronaries  · Normal LV gram        Interpretation Summary        · Moderate risk for ischemic heart disease.  · Findings consistent with an equivocal ECG stress test.  · Left ventricular ejection fraction is normal (Calculated EF = 60%).  · Myocardial perfusion imaging indicates a normal myocardial perfusion study with no evidence of ischemia.  · Impressions are consistent with a low risk study   11/1  Interpretation Summary     · Calculated EF = 68%.  · There is no evidence of  pericardial effusion         Current Outpatient Medications:   •  CBD (cannabidiol) oral oil, Take  by mouth., Disp: , Rfl:   •  cetirizine (zyrTEC) 10 MG tablet, Take 10 mg by mouth Daily., Disp: , Rfl:   •  fluticasone (FLONASE) 50 MCG/ACT nasal spray, 1 spray into the nostril(s) as directed by provider Daily., Disp: , Rfl:   •  Multiple Vitamins-Minerals (MULTIVITAMIN ADULT PO), Take 1 tablet by mouth Daily., Disp: , Rfl:   •  omeprazole (priLOSEC) 40 MG capsule, Take 40 mg by mouth Daily., Disp: , Rfl:   •  Probiotic Product (PROBIOTIC-10 PO), Take  by mouth., Disp: , Rfl:      Assessment:        Patient Active Problem List   Diagnosis   • Seasonal allergies   • Hyperlipidemia   • GERD (gastroesophageal reflux disease)   • BMI 45.0-49.9, adult (CMS/HCC)   • HPV in female   • Family history of ovarian cancer   • Lymphadenopathy   • Iron deficiency anemia due to chronic blood loss   • Insomnia   • Chronic fatigue   • JAZMYN (obstructive sleep apnea)   • Multiple joint pain   • Prediabetes   • SOB (shortness of breath)   • Leg swelling   • Chest pain, atypical               Plan:   1.  S/ p cardiac cath: Cath report and echo as above.  Denies chest pain.  Cardiac cath site no signs and symptoms of infection and no hematoma noted.    2.  Shortness of breath: We will do a Holter monitor.     3.  Palpitations: She states she has palpitations that come and go.  We will do a Holter monitor.    4.  Hyperlipidemia: Her last lipid profile on 9/17/2019 showed , HDL 38, , triglycerides 142.       Risk of the hyperlipidemia, importance of the treatment has been explained. Pros and cons of the statins has been explained. Regular blood workup as well as side effects including the liver failure, myelopathy death has been explained.    No diagnosis found.    There are no diagnoses linked to this encounter.    COUNSELING:    Armin Pedro was given to patient for the following topics: diagnostic results, risk  factor reductions, impressions, risks and benefits of treatment options and importance of treatment compliance .       SMOKING COUNSELING: Denies     She will get a 72 hr holter and follow upw ith Dr. Chambers for results    Sincerely,   MANI Rice  [facility]  11/22/19  12:08 PM

## 2019-12-04 ENCOUNTER — APPOINTMENT (OUTPATIENT)
Dept: LAB | Facility: HOSPITAL | Age: 49
End: 2019-12-04

## 2019-12-05 NOTE — PROGRESS NOTES
Subjective Patient feels about the same, quite concerned about her deconditioning, no significant improvement despite iron repletion has not noted any additional adenopathy.    REASON FOR CONSULTATION: Lymphadenopathy, anemia          REQUESTING PHYSICIAN: Raz Floyd MD, Curtis Rai MD    History of Present Illness ;  The patient is a 49-year-old female followed with a history of hyperlipidemia, GE reflux and seasonal allergies.  She also follows with her gynecologist routinely and been seen January 25 with concerns about her weight and a referral to bariatric surgery was pursued as well as screening colonoscopy.  She has a history of HPV and evidently a family history of ovarian malignancy.    The patient had undergone routine mammography January 28 revealing scattered areas of fibroglandular density and new large action lymph nodes in both axillae right greater than left.  Further ultrasound was performed February 7 demonstrating abnormal axillary lymph nodes correlating with mammographic findings both axillae.  Biopsy was performed February 18, 2019- Pathology reveals benign lymphoid tissue.  Flow cytometry also revealed no significant lymphoid immunophenotypic abnormalities with a histologic features and findings of flow cytometry and immunohistochemistry consistent with benign tissue.Additional testing in February include H&H of 10.4 and 33.5 with white count 13,800, platelet count 3 74,000 with a normal differential.    We are asked to see the patient for her anemia as well as her lymphadenopathy.  She provides a history that includes a number of symptoms including night sweats, excessive fatigue, periodic rash, shortness of breath with modest activity, periodic ankle swelling, frequency of urination and a general reduction of stamina when usually she is quite active and energetic.     The patient subsequent studies included a normal ferritin though evidence of reduced iron saturation, normal LDH,  electrophoresis without evidence of monoclonality, normal uric acid, mildly elevated sed rate to 45, FSH of 12.4, LH of 9.27,Estradiol of 41.2, normal LDH, negative evidence for flow cytometry for monoclonal population and CT studies of the chest and abdomen revealing shotty axillary nodes as well as to additionally enlarged right axillary nodes and a borderline enlarged left axillary node.  The largest left axillary is 2.0 x 0.9.  There is no mediastinal or hilar adenopathy or abdominal findings.  As the patient's reexamined and interviewed July 11 she feels about the same still with a degree of fatigue.  She had been reevaluated by [facility] with repeat axillary ultrasound showing no change for the findings and we have discussed the above findings which again support bilateral axillary adenopathy but no other findings.  She is additionally iron deficient and intolerant to oral iron with nausea and constipation.  She indicates she is actually been iron deficient for many years attempting oral iron in her teens.  We have also discussed potential endoscopy and she indicates that she is going to be seen bariatric surgery in the near future to manage her weight.  Patient is next seen September 18, 2019.  She is been reviewed by bariatric surgery and is proceeding to the processes to be considered for procedures.  She has no additional fever or chills and states that her energy and stamina have not improved substantially despite being on replete assessment September 17 with ferritin of 228, TIBC 285, iron 44 and 15% saturation.  She has not noted any other findings including no additional adenopathy.  We went on to have her undergo additional testing with repeat CT scan of chest performed December 6 demonstrating further reduction in right axial adenopathy as compared to previous.  No additional abnormalities were present except for fatty liver.  As the patient is seen December 11 she has been undergoing  testing as she is preparing for bariatric surgery.  This is certainly encouraged into being almost certain that her ongoing issues are secondary to her weight.  Past Medical History:   Diagnosis Date   • Anemia    • Arthritis    • GERD (gastroesophageal reflux disease)    • Hair loss    • History of obesity    • HPV in female 2012   • Hyperlipidemia    • Infectious mononucleosis    • Lymphadenopathy 02/2019    Right axilla   • JAZMYN (obstructive sleep apnea)    • Patient exposure to body fluids    • Plantar fasciitis, bilateral    • Seasonal allergies         Past Surgical History:   Procedure Laterality Date   • CARDIAC CATHETERIZATION N/A 11/15/2019    Procedure: Right Heart Cath;  Surgeon: Leandra Yanes MD;  [facility];  Service: Cardiology   • CARDIAC CATHETERIZATION N/A 11/15/2019    Procedure: Left Heart Cath;  Surgeon: Leandra Yanes MD;  Location:  [facility];  Service: Cardiology   • CARDIAC CATHETERIZATION N/A 11/15/2019    Procedure: Left ventriculography;  Surgeon: Leandra Yanes MD;  Location:  [facility];  Service: Cardiology   • CARDIAC CATHETERIZATION N/A 11/15/2019    Procedure: Coronary angiography;  Surgeon: Leandra Yanes MD;  Location:  [facility];  Service: Cardiology   • ESSURE TUBAL LIGATION     • LASIK Bilateral    • TONSILLECTOMY          Current Outpatient Medications on File Prior to Visit   Medication Sig Dispense Refill   • CBD (cannabidiol) oral oil Take  by mouth.     • cetirizine (zyrTEC) 10 MG tablet Take 10 mg by mouth Daily.     • fluticasone (FLONASE) 50 MCG/ACT nasal spray 1 spray into the nostril(s) as directed by provider Daily.     • Multiple Vitamins-Minerals (MULTIVITAMIN ADULT PO) Take 1 tablet by mouth Daily.     • omeprazole (priLOSEC) 40 MG capsule Take 40 mg by mouth Daily.     • Probiotic Product (PROBIOTIC-10 PO) Take  by mouth.       No current  facility-administered medications on file prior to visit.         ALLERGIES:    Allergies   Allergen Reactions   • Erythromycin Other (See Comments)     tachycardia   • Onion Photosensitivity   • Stahist [Dexchlorpheniramine-Phenyleph] Other (See Comments)     tachacardia        Social History     Socioeconomic History   • Marital status: Single     Spouse name: Not on file   • Number of children: Not on file   • Years of education: College   • Highest education level: Not on file   Occupational History   • Occupation: Director     Employer: [employer]   Tobacco Use   • Smoking status: Never Smoker   • Smokeless tobacco: Never Used   Substance and Sexual Activity   • Alcohol use: Yes     Frequency: Monthly or less     Comment: social   • Drug use: No   • Sexual activity: Defer     Partners: Male     Birth control/protection: Surgical        Family History   Problem Relation Age of Onset   • Hypertension Mother    • Hyperlipidemia Mother    • Breast cancer Mother 62        DCIS   • Thyroid disease Mother    • Diabetes Father    • Hyperlipidemia Father    • Heart attack Father    • Stroke Father    • Heart disease Father         CHF   • Obesity Father    • Endometriosis Maternal Grandmother    • Ovarian cancer Maternal Grandmother 49        gyn ? ovarian   • Osteoporosis Maternal Grandfather         Review of Systems   Constitutional: Positive for activity change (09/18/19-Unchanged), chills (09/18/19-Unchanged), diaphoresis (09/18/19-Unchanged) and unexpected weight change (09/18/19-Unchanged). Negative for appetite change, fatigue and fever.   HENT: Negative for hearing loss, sore throat and trouble swallowing.    Respiratory: Positive for shortness of breath (09/18/19-Unchanged). Negative for cough, chest tightness and wheezing.    Cardiovascular: Positive for palpitations (09/18/19-Unchanged) and leg swelling (09/18/19-Unchanged Bilat knee). Negative for chest pain.   Gastrointestinal: Positive for nausea  "(09/18/19-Improved). Negative for abdominal distention, abdominal pain, constipation, diarrhea and vomiting.   Genitourinary: Negative for dysuria, frequency, hematuria and urgency.   Musculoskeletal: Positive for arthralgias (09/18/19-Unchanged) and neck pain (09/18/19-Glands swollen both sides of neck.). Negative for joint swelling.        No muscle weakness.   Skin: Negative for rash and wound.   Neurological: Negative for dizziness, seizures, syncope, speech difficulty, weakness, numbness and headaches.   Hematological: Negative for adenopathy. Does not bruise/bleed easily.   Psychiatric/Behavioral: Negative for behavioral problems, confusion and suicidal ideas.        Objective     Vitals:    12/11/19 1325   BP: 126/71   Pulse: 82   Resp: 16   Temp: 98.2 °F (36.8 °C)   TempSrc: Oral   SpO2: 97%   Weight: 118 kg (259 lb 11.2 oz)   Height: 154.9 cm (60.98\")   PainSc: 0-No pain     Current Status 12/11/2019   ECOG score 0       Physical Exam   Constitutional: She is oriented to person, place, and time. She appears well-developed and well-nourished.   Morbidly obese  female   HENT:   Head: Normocephalic and atraumatic.   Nose: Nose normal.   Eyes: EOM are normal. Pupils are equal, round, and reactive to light.   Neck: Normal range of motion. Neck supple.   No lymphadenopathy noted in cervical, infraclavicular, supraclavicular, axillary or groin regions.   Cardiovascular: Normal rate, regular rhythm, normal heart sounds and intact distal pulses.   Pulmonary/Chest: Effort normal and breath sounds normal.   Abdominal: Soft. Bowel sounds are normal.   Musculoskeletal: Normal range of motion.   Lymphadenopathy:     She has no cervical adenopathy.   Neurological: She is alert and oriented to person, place, and time.   Skin: Skin is warm and dry.   Psychiatric: She has a normal mood and affect. Her behavior is normal. Thought content normal.         RECENT LABS:  Hematology WBC   Date Value Ref Range Status "   12/11/2019 13.53 (H) 3.40 - 10.80 10*3/mm3 Final     RBC   Date Value Ref Range Status   12/11/2019 4.37 3.77 - 5.28 10*6/mm3 Final     Hemoglobin   Date Value Ref Range Status   12/11/2019 12.3 12.0 - 15.9 g/dL Final     Hematocrit   Date Value Ref Range Status   12/11/2019 38.1 34.0 - 46.6 % Final     Platelets   Date Value Ref Range Status   12/11/2019 296 140 - 450 10*3/mm3 Final      X-RAY CHEST TWO-VIEW 09/17/19  CONCLUSION: No active disease of the chest.     CT CHEST, ABDOMEN, AND PELVIS WITH IV CONTRAST 7/5/19    FINDINGS:  CHEST: In addition to shotty nodes at both axilla, there are a few  mildly enlarged nodes. Two of the largest right axillary nodes both  measure 1.7 x 1.3 cm. The largest left axillary node is best seen on the  coronal reconstruction series measuring 2.0 x 0.9 cm. There is no  mediastinal or hilar lymphadenopathy. An AP window node measures 1.4 x  0.6 cm. There are no pulmonary airspace opacities and there are no  pleural or pericardial effusions.     ABDOMEN/PELVIS: Splenic size is normal and there is no lymphadenopathy.  There are fairly typical shotty nodes at the terrell hepatis and  mesentery. There is mild fatty infiltration of the liver. The  gallbladder appears unremarkable and there is no biliary dilatation. The  pancreas, adrenals, and kidneys appear unremarkable. No acute bowel  abnormality is seen. The appendix appears normal. There is very mild  sigmoid diverticulosis and no evidence for diverticulitis. The uterus  and adnexa appear unremarkable other than metallic wires within the  fallopian tubes.     IMPRESSION:  1. In addition to shotty nodes at the axilla, there are 2 enlarged right  axillary nodes and there is a borderline enlarged left axillary node.  There is no mediastinal or hilar lymphadenopathy.  2. Splenic size is normal and there is no lymphadenopathy within the  abdomen or pelvis.  3. Very mild sigmoid diverticulosis without evidence for  diverticulitis.          THORACIC CT SCAN WITH CONTRAST    December 6, 2019  HISTORY: Axillary adenopathy f/u; D50.0-Iron deficiency anemia secondary  to blood loss (chronic).     COMPARISON: 07/05/2019.     TECHNIQUE:  Radiation dose reduction techniques were utilized, including  automated exposure control and exposure modulation based on body size.  Axial images of the thorax obtained with IV contrast.      FINDINGS: Lungs clear, no edema or effusion. Borderline cardiomegaly.  Aorta nonaneurysmal. There is no mediastinal or hilar adenopathy by CT  size criteria. Right axillary adenopathy does show improvement. The node  on image 32 now measuring 12 x 9 mm, previously 17 x 13 mm. The node  slightly more inferiorly now measures 13 x 12 mm, previously 17 x 13 mm.     Images of the included upper abdomen show mild fatty liver.     IMPRESSION:  1. Diminishing right axillary adenopathy. No active airspace disease or  abnormal enhancement.  2. Fatty liver.        Assessment/Plan        49-year-old female with a history of hyperlipidemia, GE reflux, seasonal allergies and morbid obesity recently accessing potential bariatric surgery.  She had undergone routine mammography in late January showing new large lymphadenopathy in both axillae, confirmed on ultrasound and eventual needle biopsy February 18, 2019 showing benign lymphoid tissue.  Approximately at the same point the patient was developing significant anemia with February 18 assessment including H&H of 10.4 and 33.5 MCV not 77.2, MCH of 24.0, normal platelet count normal platelet count, and mild leukocytosis with normal differential.  The patient states that she was placed on oral iron but does not take it routinely and, indeed, her hemoglobin hematocrit are mildly improved.  Her symptoms however described as above are significant with excessive fatigue upon minimal activity, night sweats, temperature intolerance and shortness of breath again with minimal  activity.     We have discussed an assessment to include not only her degree of anemia but also asked whether it is potentially associated with further lymphadenopathy at this point.  Finally she also has symptoms that could also include perimenopause.  As result we proceeded with studies finding her premenopausal and with bilateral axillary adenopathy including 2 enlarged right axillary nodes and a borderline enlarged left axillary node but no other significant findings except for mild sigmoid diverticulosis.   As the patient is reviewed July 11 her iron deficiency is significant enough to suggest additional iron though she is intolerant to oral therapy.  We therefore discussed an IV iron infusion in the next several weeks as she also pursues a bariatric assessment for weight management.  We would additionally follow bilateral axillary ultrasound perhaps in the next 6 months and potential endoscopy.  Patient seen back she was given IV Injectafer and is reassessed September 18 with a mild improvement in hemoglobin hematocrit, no current evidence of iron deficiency but still with some degree of weakness.  Her exam does not reveal any new abnormalities the follow-up of her adenopathy seems prudent and will be assessed.  The patient was reassessed by follow-up CT chest December 6, 2019 found to show actual improvement of her axillary adenopathy.  At this point it is felt that she should proceed as planned for bariatric surgery as it can be scheduled.    Plan:            *Plan to follow at 6-month for review      *Gastric sleeve surgery anticipated next available

## 2019-12-06 ENCOUNTER — APPOINTMENT (OUTPATIENT)
Dept: LAB | Facility: HOSPITAL | Age: 49
End: 2019-12-06

## 2019-12-06 ENCOUNTER — TELEPHONE (OUTPATIENT)
Dept: ONCOLOGY | Facility: CLINIC | Age: 49
End: 2019-12-06

## 2019-12-06 ENCOUNTER — HOSPITAL ENCOUNTER (OUTPATIENT)
Dept: PET IMAGING | Facility: HOSPITAL | Age: 49
Discharge: HOME | End: 2019-12-06
Admitting: INTERNAL MEDICINE

## 2019-12-06 DIAGNOSIS — D50.0 IRON DEFICIENCY ANEMIA DUE TO CHRONIC BLOOD LOSS: ICD-10-CM

## 2019-12-06 LAB — CREAT BLDA-MCNC: 0.7 MG/DL (ref 0.6–1.3)

## 2019-12-06 PROCEDURE — 71260 CT THORAX DX C+: CPT

## 2019-12-06 PROCEDURE — 25010000002 IOPAMIDOL 61 % SOLUTION: Performed by: INTERNAL MEDICINE

## 2019-12-06 PROCEDURE — 82565 ASSAY OF CREATININE: CPT

## 2019-12-06 RX ADMIN — IOPAMIDOL 75 ML: 612 INJECTION, SOLUTION INTRAVENOUS at 15:01

## 2019-12-06 NOTE — TELEPHONE ENCOUNTER
----- Message from Erin Streeter sent at 12/6/2019  4:00 PM EST -----  Regarding: vitals only needs lab appt  Ct/ pet scan didn't draw correct tube for cbc    Patient  is a vitals only with dr malcolm next week needs a lab appt for cbc thanks

## 2019-12-11 ENCOUNTER — LAB (OUTPATIENT)
Dept: LAB | Facility: HOSPITAL | Age: 49
End: 2019-12-11

## 2019-12-11 ENCOUNTER — OFFICE VISIT (OUTPATIENT)
Dept: ONCOLOGY | Facility: CLINIC | Age: 49
End: 2019-12-11

## 2019-12-11 VITALS
BODY MASS INDEX: 49.03 KG/M2 | TEMPERATURE: 98.2 F | SYSTOLIC BLOOD PRESSURE: 126 MMHG | OXYGEN SATURATION: 97 % | WEIGHT: 259.7 LBS | HEIGHT: 61 IN | DIASTOLIC BLOOD PRESSURE: 71 MMHG | HEART RATE: 82 BPM | RESPIRATION RATE: 16 BRPM

## 2019-12-11 DIAGNOSIS — D50.0 IRON DEFICIENCY ANEMIA DUE TO CHRONIC BLOOD LOSS: ICD-10-CM

## 2019-12-11 DIAGNOSIS — R59.1 LYMPHADENOPATHY: Primary | ICD-10-CM

## 2019-12-11 LAB
BASOPHILS # BLD AUTO: 0.03 10*3/MM3 (ref 0–0.2)
BASOPHILS NFR BLD AUTO: 0.2 % (ref 0–1.5)
DEPRECATED RDW RBC AUTO: 43.9 FL (ref 37–54)
EOSINOPHIL # BLD AUTO: 0.39 10*3/MM3 (ref 0–0.4)
EOSINOPHIL NFR BLD AUTO: 2.9 % (ref 0.3–6.2)
ERYTHROCYTE [DISTWIDTH] IN BLOOD BY AUTOMATED COUNT: 13.7 % (ref 12.3–15.4)
HCT VFR BLD AUTO: 38.1 % (ref 34–46.6)
HGB BLD-MCNC: 12.3 G/DL (ref 12–15.9)
IMM GRANULOCYTES # BLD AUTO: 0.18 10*3/MM3 (ref 0–0.05)
IMM GRANULOCYTES NFR BLD AUTO: 1.3 % (ref 0–0.5)
LYMPHOCYTES # BLD AUTO: 3.09 10*3/MM3 (ref 0.7–3.1)
LYMPHOCYTES NFR BLD AUTO: 22.8 % (ref 19.6–45.3)
MCH RBC QN AUTO: 28.1 PG (ref 26.6–33)
MCHC RBC AUTO-ENTMCNC: 32.3 G/DL (ref 31.5–35.7)
MCV RBC AUTO: 87.2 FL (ref 79–97)
MONOCYTES # BLD AUTO: 0.61 10*3/MM3 (ref 0.1–0.9)
MONOCYTES NFR BLD AUTO: 4.5 % (ref 5–12)
NEUTROPHILS # BLD AUTO: 9.23 10*3/MM3 (ref 1.7–7)
NEUTROPHILS NFR BLD AUTO: 68.3 % (ref 42.7–76)
NRBC BLD AUTO-RTO: 0 /100 WBC (ref 0–0.2)
PLATELET # BLD AUTO: 296 10*3/MM3 (ref 140–450)
PMV BLD AUTO: 9.7 FL (ref 6–12)
RBC # BLD AUTO: 4.37 10*6/MM3 (ref 3.77–5.28)
WBC NRBC COR # BLD: 13.53 10*3/MM3 (ref 3.4–10.8)

## 2019-12-11 PROCEDURE — 36415 COLL VENOUS BLD VENIPUNCTURE: CPT

## 2019-12-11 PROCEDURE — 85025 COMPLETE CBC W/AUTO DIFF WBC: CPT

## 2019-12-11 PROCEDURE — 99214 OFFICE O/P EST MOD 30 MIN: CPT | Performed by: INTERNAL MEDICINE

## 2019-12-23 ENCOUNTER — OFFICE VISIT (OUTPATIENT)
Dept: CARDIOLOGY | Facility: CLINIC | Age: 49
End: 2019-12-23

## 2019-12-23 VITALS
HEART RATE: 78 BPM | BODY MASS INDEX: 49.1 KG/M2 | DIASTOLIC BLOOD PRESSURE: 85 MMHG | HEIGHT: 61 IN | SYSTOLIC BLOOD PRESSURE: 130 MMHG

## 2019-12-23 DIAGNOSIS — E78.5 HYPERLIPIDEMIA, UNSPECIFIED HYPERLIPIDEMIA TYPE: ICD-10-CM

## 2019-12-23 DIAGNOSIS — R06.02 SOB (SHORTNESS OF BREATH): Primary | ICD-10-CM

## 2019-12-23 PROCEDURE — 99212 OFFICE O/P EST SF 10 MIN: CPT | Performed by: INTERNAL MEDICINE

## 2019-12-23 NOTE — PROGRESS NOTES
Subjective:        Armin Viramontes is a 49 y.o. female who here for follow up    CC  FOLLOW UP RT AND LT HEART CATH  HPI  49-year-old female recently underwent diagnostic heart catheterization without any problems and complications     Problem List Items Addressed This Visit        Cardiovascular and Mediastinum    Hyperlipidemia       Respiratory    SOB (shortness of breath) - Primary        .Conclusion        · Successful right and left coronary angiogram and LV gram  · Successful right heart catheter  · Normal right-sided pressures  · Normal coronaries         The following portions of the patient's history were reviewed and updated as appropriate: allergies, current medications, past family history, past medical history, past social history, past surgical history and problem list.    Past Medical History:   Diagnosis Date   • Anemia    • Arthritis    • GERD (gastroesophageal reflux disease)    • Hair loss    • History of obesity    • HPV in female 2012   • Hyperlipidemia    • Infectious mononucleosis    • Lymphadenopathy 02/2019    Right axilla   • JAZMYN (obstructive sleep apnea)    • Patient exposure to body fluids    • Plantar fasciitis, bilateral    • Seasonal allergies      reports that she has never smoked. She has never used smokeless tobacco. She reports that she drinks alcohol. She reports that she does not use drugs.   Family History   Problem Relation Age of Onset   • Hypertension Mother    • Hyperlipidemia Mother    • Breast cancer Mother 62        DCIS   • Thyroid disease Mother    • Diabetes Father    • Hyperlipidemia Father    • Heart attack Father    • Stroke Father    • Heart disease Father         CHF   • Obesity Father    • Endometriosis Maternal Grandmother    • Ovarian cancer Maternal Grandmother 49        gyn ? ovarian   • Osteoporosis Maternal Grandfather        Review of Systems  Constitutional: No wt loss, fever, fatigue  Gastrointestinal: No nausea, abdominal pain  Behavioral/Psych: No  "insomnia or anxiety   Cardiovascular no chest pains or tightness in the chest  Objective:       Physical Exam  /85 (BP Location: Left arm, Patient Position: Sitting)   Pulse 78   Ht 154.9 cm (60.98\")   BMI 49.10 kg/m²   General appearance: No acute changes   Neck: Trachea midline; NECK, supple, no thyromegaly or lymphadenopathy   Lungs: Normal size and shape, normal breath sounds, equal distribution of air, no rales and rhonchi   CV: S1-S2 regular, no murmurs, no rub, no gallop   Abdomen: Soft, non-tender; no masses , no abnormal abdominal sounds   Extremities: No deformity , normal color , no peripheral edema   Skin: Normal temperature, turgor and texture; no rash, ulcers          Procedures      Echocardiogram:        Current Outpatient Medications:   •  CBD (cannabidiol) oral oil, Take  by mouth., Disp: , Rfl:   •  cetirizine (zyrTEC) 10 MG tablet, Take 10 mg by mouth Daily., Disp: , Rfl:   •  fluticasone (FLONASE) 50 MCG/ACT nasal spray, 1 spray into the nostril(s) as directed by provider Daily., Disp: , Rfl:   •  Multiple Vitamins-Minerals (MULTIVITAMIN ADULT PO), Take 1 tablet by mouth Daily., Disp: , Rfl:   •  omeprazole (priLOSEC) 40 MG capsule, Take 40 mg by mouth Daily., Disp: , Rfl:   •  Probiotic Product (PROBIOTIC-10 PO), Take  by mouth., Disp: , Rfl:    Assessment:        Patient Active Problem List   Diagnosis   • Seasonal allergies   • Hyperlipidemia   • GERD (gastroesophageal reflux disease)   • BMI 45.0-49.9, adult (CMS/[facility])   • HPV in female   • Family history of ovarian cancer   • Lymphadenopathy   • Iron deficiency anemia due to chronic blood loss   • Insomnia   • Chronic fatigue   • JAZMYN (obstructive sleep apnea)   • Multiple joint pain   • Prediabetes   • SOB (shortness of breath)   • Leg swelling   • Chest pain, atypical               Plan:            ICD-10-CM ICD-9-CM   1. SOB (shortness of breath) R06.02 786.05   2. Hyperlipidemia, unspecified hyperlipidemia type E78.5 272.4     1. " SOB (shortness of breath)  Under control    2. Hyperlipidemia, unspecified hyperlipidemia type  Counseling done  Armin Viramontes here for the follow-up post heart catheter, being treated medically.Armin Viramontes has no complications.  Access site has been examined shows no complications.  Patient has been advised to contact us with any further issues and questions related to the heart catheter         OK FOR SURG    Armin Viramontes seen and examined with no clinical signs of angina or chf, pt is cleared for surgery with non modifiable risk factors.  Armin Viramontes has been advised to take cardiac meds with sip of water on the day of surgery.    Please use beta blocker for tachycardia perioperatively    Anticoagulation to be managed appropriately    Watch for chest pain, shortness of breath, palpitations, arrhythmias, and significant change in the blood pressure perioperatively,     Please check EKG preop and postop if any questions, notify us if any change in patient's cardiovascular conditions      SEE IN 1 YR  COUNSELING:    Armin GuillorydCounseling was given to patient for the following topics: diagnostic results, risk factor reductions, impressions, risks and benefits of treatment options and importance of treatment compliance .       SMOKING COUNSELING:    [unfilled]    Dictated using Dragon dictation

## 2020-01-03 ENCOUNTER — PREP FOR SURGERY (OUTPATIENT)
Dept: OTHER | Facility: HOSPITAL | Age: 50
End: 2020-01-03

## 2020-01-03 DIAGNOSIS — K21.9 GASTROESOPHAGEAL REFLUX DISEASE, ESOPHAGITIS PRESENCE NOT SPECIFIED: Primary | ICD-10-CM

## 2020-01-03 RX ORDER — SODIUM CHLORIDE, SODIUM LACTATE, POTASSIUM CHLORIDE, CALCIUM CHLORIDE 600; 310; 30; 20 MG/100ML; MG/100ML; MG/100ML; MG/100ML
30 INJECTION, SOLUTION INTRAVENOUS CONTINUOUS
Status: CANCELLED | OUTPATIENT
Start: 2020-02-11

## 2020-01-06 PROBLEM — K21.9 GASTROESOPHAGEAL REFLUX DISEASE: Status: ACTIVE | Noted: 2020-01-06

## 2020-02-11 ENCOUNTER — ANESTHESIA (OUTPATIENT)
Dept: GASTROENTEROLOGY | Facility: HOSPITAL | Age: 50
End: 2020-02-11

## 2020-02-11 ENCOUNTER — HOSPITAL ENCOUNTER (OUTPATIENT)
Facility: HOSPITAL | Age: 50
Setting detail: HOSPITAL OUTPATIENT SURGERY
Discharge: HOME | End: 2020-02-11
Attending: SURGERY | Admitting: SURGERY

## 2020-02-11 ENCOUNTER — ANESTHESIA EVENT (OUTPATIENT)
Dept: GASTROENTEROLOGY | Facility: HOSPITAL | Age: 50
End: 2020-02-11

## 2020-02-11 VITALS
SYSTOLIC BLOOD PRESSURE: 118 MMHG | OXYGEN SATURATION: 100 % | DIASTOLIC BLOOD PRESSURE: 98 MMHG | HEART RATE: 65 BPM | WEIGHT: 265.19 LBS | HEIGHT: 61 IN | BODY MASS INDEX: 50.07 KG/M2 | TEMPERATURE: 98.1 F | RESPIRATION RATE: 16 BRPM

## 2020-02-11 DIAGNOSIS — K21.9 GASTROESOPHAGEAL REFLUX DISEASE, ESOPHAGITIS PRESENCE NOT SPECIFIED: ICD-10-CM

## 2020-02-11 PROBLEM — K31.7 GASTRIC POLYPS: Status: ACTIVE | Noted: 2020-02-11

## 2020-02-11 LAB
B-HCG UR QL: NEGATIVE
INTERNAL NEGATIVE CONTROL: NEGATIVE
INTERNAL POSITIVE CONTROL: POSITIVE
Lab: NORMAL

## 2020-02-11 PROCEDURE — 87081 CULTURE SCREEN ONLY: CPT | Performed by: SURGERY

## 2020-02-11 PROCEDURE — 43239 EGD BIOPSY SINGLE/MULTIPLE: CPT | Performed by: SURGERY

## 2020-02-11 PROCEDURE — 81025 URINE PREGNANCY TEST: CPT | Performed by: SURGERY

## 2020-02-11 PROCEDURE — 88305 TISSUE EXAM BY PATHOLOGIST: CPT | Performed by: SURGERY

## 2020-02-11 PROCEDURE — 25010000002 PROPOFOL 10 MG/ML EMULSION: Performed by: ANESTHESIOLOGY

## 2020-02-11 RX ORDER — LIDOCAINE HYDROCHLORIDE 20 MG/ML
INJECTION, SOLUTION INFILTRATION; PERINEURAL AS NEEDED
Status: DISCONTINUED | OUTPATIENT
Start: 2020-02-11 | End: 2020-02-11 | Stop reason: SURG

## 2020-02-11 RX ORDER — SODIUM CHLORIDE, SODIUM LACTATE, POTASSIUM CHLORIDE, CALCIUM CHLORIDE 600; 310; 30; 20 MG/100ML; MG/100ML; MG/100ML; MG/100ML
30 INJECTION, SOLUTION INTRAVENOUS CONTINUOUS
Status: DISCONTINUED | OUTPATIENT
Start: 2020-02-11 | End: 2020-02-11 | Stop reason: HOSPADM

## 2020-02-11 RX ORDER — PROPOFOL 10 MG/ML
VIAL (ML) INTRAVENOUS AS NEEDED
Status: DISCONTINUED | OUTPATIENT
Start: 2020-02-11 | End: 2020-02-11 | Stop reason: SURG

## 2020-02-11 RX ADMIN — LIDOCAINE HYDROCHLORIDE 60 MG: 20 INJECTION, SOLUTION INFILTRATION; PERINEURAL at 08:03

## 2020-02-11 RX ADMIN — PROPOFOL 150 MG: 10 INJECTION, EMULSION INTRAVENOUS at 08:03

## 2020-02-11 RX ADMIN — SODIUM CHLORIDE, POTASSIUM CHLORIDE, SODIUM LACTATE AND CALCIUM CHLORIDE 30 ML/HR: 600; 310; 30; 20 INJECTION, SOLUTION INTRAVENOUS at 07:56

## 2020-02-11 RX ADMIN — PROPOFOL 50 MG: 10 INJECTION, EMULSION INTRAVENOUS at 08:08

## 2020-02-11 NOTE — ANESTHESIA POSTPROCEDURE EVALUATION
"Patient: Armin Viramontes    Procedure Summary     Date:  02/11/20 Room / Location:   [facility]    Anesthesia Start:  0801 Anesthesia Stop:  0817    Procedure:  ESOPHAGOGASTRODUODENOSCOPY WITH BIOPSY (N/A Esophagus) Diagnosis:       Gastroesophageal reflux disease, esophagitis presence not specified      (Gastroesophageal reflux disease, esophagitis presence not specified [K21.9])    Surgeon:  Andrey Stafford Jr., MD Provider:  Naun Fernandez MD    Anesthesia Type:  MAC ASA Status:  3          Anesthesia Type: MAC    Vitals  Vitals Value Taken Time   /98 2/11/2020  8:34 AM   Temp     Pulse 65 2/11/2020  8:34 AM   Resp 16 2/11/2020  8:34 AM   SpO2 100 % 2/11/2020  8:34 AM           Post Anesthesia Care and Evaluation    Patient location during evaluation: PACU  Patient participation: complete - patient participated  Level of consciousness: awake  Pain score: 0  Pain management: adequate  Airway patency: patent  Anesthetic complications: No anesthetic complications  PONV Status: none  Cardiovascular status: acceptable  Respiratory status: acceptable  Hydration status: acceptable    Comments: /98 (BP Location: Left arm, Patient Position: Sitting)   Pulse 65   Temp 36.7 °C (98.1 °F) (Oral)   Resp 16   Ht 154.9 cm (61\")   Wt 120 kg (265 lb 3 oz)   LMP 02/08/2020   SpO2 100%   BMI 50.11 kg/m²       "

## 2020-02-11 NOTE — H&P
Patient Care Team:  Raz Floyd MD as PCP - General (Internal Medicine)  Curtis Rai MD as Referring Physician (Obstetrics and Gynecology)  Marsha Warren MD as Consulting Physician (Hematology and Oncology)  Raz Perera MD as Consulting Physician (Hematology and Oncology)    Chief complaint Heartburn and in need of preoperative clearance prior to surgery    Subjective     Patient is a 49 y.o. female who is a patient of ours and has undergone our extensive initial evaluation for bariatric surgery and needs to proceed with upper endoscopy for preoperative clearance prior to proceeding with surgery.  Please see the initial history and physical for further detailed information.      Review of Systems   Pertinent items are noted in HPI and no changes since last visit.    Past Medical History:   Diagnosis Date   • Anemia    • Arthritis    • GERD (gastroesophageal reflux disease)    • Hair loss    • History of obesity    • HPV in female 2012   • Hyperlipidemia    • Infectious mononucleosis    • Lymphadenopathy 02/2019    Right axilla   • JAZMYN (obstructive sleep apnea)    • Patient exposure to body fluids    • Plantar fasciitis, bilateral    • Seasonal allergies      Past Surgical History:   Procedure Laterality Date   • CARDIAC CATHETERIZATION N/A 11/15/2019    Procedure: Right Heart Cath;  Surgeon: Leandra Yanes MD;  Location: [facility] CATH [facility]ASIVE LOC[facility];  Service: Cardiology   • CARDIAC CATHETERIZATION N/A 11/15/2019    Procedure: Left Heart Cath;  Surgeon: Leandra Yanes MD;  Location: [facility] C[facility] LOC[facility];  Service: Cardiology   • CARDIAC CATHETERIZATION N/A 11/15/2019    Procedure: Left ventriculography;  Surgeon: Leandra Yanes MD;  Location: [facility] LOC[facility];  Service: Cardiology   • CARDIAC CATHETERIZATION N/A 11/15/2019    Procedure: Coronary angiography;  Surgeon: Leandra Yanes MD;  Location: [facility] C[facility] LOC[facility];  Service:  Cardiology   • ESSURE TUBAL LIGATION     • LASIK Bilateral    • TONSILLECTOMY       Family History   Problem Relation Age of Onset   • Hypertension Mother    • Hyperlipidemia Mother    • Breast cancer Mother 62        DCIS   • Thyroid disease Mother    • Diabetes Father    • Hyperlipidemia Father    • Heart attack Father    • Stroke Father    • Heart disease Father         CHF   • Obesity Father    • Endometriosis Maternal Grandmother    • Ovarian cancer Maternal Grandmother 49        gyn ? ovarian   • Osteoporosis Maternal Grandfather      Social History     Tobacco Use   • Smoking status: Never Smoker   • Smokeless tobacco: Never Used   Substance Use Topics   • Alcohol use: Yes     Frequency: Monthly or less     Comment: social   • Drug use: No     Medications Prior to Admission   Medication Sig Dispense Refill Last Dose   • CBD (cannabidiol) oral oil Take  by mouth.   Taking   • cetirizine (zyrTEC) 10 MG tablet Take 10 mg by mouth Daily.   Taking   • fluticasone (FLONASE) 50 MCG/ACT nasal spray 1 spray into the nostril(s) as directed by provider Daily.   Taking   • Multiple Vitamins-Minerals (MULTIVITAMIN ADULT PO) Take 1 tablet by mouth Daily.   Taking   • omeprazole (priLOSEC) 40 MG capsule Take 40 mg by mouth Daily.   Taking   • Probiotic Product (PROBIOTIC-10 PO) Take  by mouth.   Taking     Allergies:  Erythromycin; Onion; and Stahist [dexchlorpheniramine-phenyleph]    Vital Signs  See PreOp record  BP: ()/()   Arterial Line BP: ()/()          Physical Exam:   Heart: RR  Lungs: CTA B  Abd: soft and NT/ND  Ext: no clubbing, cyanosis    Results Review:    I have reviewed the patient's clinical results      Gastroesophageal reflux disease      The risks and benefits of the procedure were discussed with the patient in detail and all questions were answered.  Possibility of perforation, bleeding, aspiration, anoxic brain injury, respiratory and/or cardiac arrest and death were discussed.  Consent will be signed  and witnessed..     Andrey Stafford MD  02/11/20  7:33 AM  Time: Approximately 15 minutes was spent with the patient and over half that time was spent counseling.  All of the patients questions were answered.

## 2020-02-11 NOTE — OP NOTE
Surgeon: Andrey Stafford Jr., M.D.    Preoperative Diagnosis: Gastroesophageal reflux disease    Postoperative Diagnosis: #1 gastritis #2 gastric body polyps    Procedure Performed: Transoral esophagogastroduodenoscopy with forceps polypectomies and antral biopsies    Indications: 49-year-old female with morbid obesity with complaints of heartburn.  Patient does take omeprazole on a regular basis.    Procedure:     The procedure, indications, preparation and potential complications were explained to the patient, who indicated understanding and signed the corresponding consent forms.  The patient was identified, taken to the endoscopy suite, and placed on the left side down decubitus position.  The patient underwent a MAC anesthesia and was appropriately monitored through the case by the anesthesia personnel with continuous pulse oximetry, blood pressure, and cardiac monitoring.  A bite block was placed.  After adequate IV sedation and using a transoral technique a lubed flexible endoscope was placed in the hypopharynx and advanced to the second portion of the duodenum without difficulty. The scope was then withdrawn back into the antrum of the stomach.  Cold forcep biopsies of the antrum were taken to rule out Helicobacter pylori.  The scope was retroflexed noting the body, fundus and cardia.  The scope was then withdrawn back into the esophagus after decompressing the stomach.  The Z line was noted and GE junction measured from the incisors.  The scope was then completely withdrawn.  The patient tolerated the procedure well and left the endoscopy suite in stable condition.  The findings are listed below.    Duodenum: Unremarkable  Antrum: Mild patchy erythema  Body/Fundus: Small polyps measuring up to 5 to 6 mm in diameter.  The larger ones were removed with biopsy forceps and sent off to pathology rule out dysplasia.  No active bleeding noted  Cardia: Unremarkable  Esophagus: Z line fairly regular; GE junction  measured approximately 39 cm from the incisors    Recommendations:     Await biopsy results and follow-up in the office

## 2020-02-11 NOTE — ANESTHESIA PREPROCEDURE EVALUATION
Anesthesia Evaluation     Patient summary reviewed and Nursing notes reviewed                Airway   Mallampati: I  TM distance: >3 FB  Neck ROM: full  No difficulty expected  Dental - normal exam     Pulmonary - normal exam   (+) shortness of breath, sleep apnea,   Cardiovascular - normal exam    ECG reviewed    (+) hyperlipidemia,     ROS comment: EF60%    Neuro/Psych- negative ROS  GI/Hepatic/Renal/Endo    (+) obesity, morbid obesity, GERD,      Musculoskeletal     Abdominal  - normal exam    Bowel sounds: normal.   Substance History - negative use     OB/GYN negative ob/gyn ROS         Other   arthritis,                    Anesthesia Plan    ASA 3     MAC       Anesthetic plan, all risks, benefits, and alternatives have been provided, discussed and informed consent has been obtained with: patient.

## 2020-02-12 ENCOUNTER — TELEPHONE (OUTPATIENT)
Dept: BARIATRICS/WEIGHT MGMT | Facility: CLINIC | Age: 50
End: 2020-02-12

## 2020-02-12 LAB
CYTO UR: NORMAL
LAB AP CASE REPORT: NORMAL
PATH REPORT.FINAL DX SPEC: NORMAL
PATH REPORT.GROSS SPEC: NORMAL
UREASE TISS QL: NEGATIVE

## 2020-02-12 NOTE — TELEPHONE ENCOUNTER
LM for patient regarding negative EGD results. Instructed patient to call back with any questions or concerns.    ----- Message from Andrey Stafford Jr., MD sent at 2/12/2020 11:41 AM EST -----  Please call patient with negative results.

## 2020-02-14 ENCOUNTER — TELEPHONE (OUTPATIENT)
Dept: BARIATRICS/WEIGHT MGMT | Facility: CLINIC | Age: 50
End: 2020-02-14

## 2020-02-14 NOTE — TELEPHONE ENCOUNTER
I talked to patient on the phone.  Patient states she is feeling much better today.  I discussed that possibly could be from the scope in the anesthesia but also could be just or a viral illness.  Again, patient is feeling better and all of her questions were answered.  She was just making sure nothing else going on.

## 2020-02-14 NOTE — TELEPHONE ENCOUNTER
EGD was performed 2/11 and patient started feeling nauseous 2/12, no vomiting but constantly nauseous, feeling overall weak and some diarrhea. Patient was wondering if this could be something related to the anesthesia or if it is unrelated. She denied any fever.    Please advise    Patient missed work yesterday due to this and is requesting a work note if this could have been caused by the EGD.

## 2020-02-21 ENCOUNTER — TELEPHONE (OUTPATIENT)
Dept: ONCOLOGY | Facility: CLINIC | Age: 50
End: 2020-02-21

## 2020-02-28 ENCOUNTER — TELEPHONE (OUTPATIENT)
Dept: OBSTETRICS AND GYNECOLOGY | Age: 50
End: 2020-02-28

## 2020-02-28 RX ORDER — FLUCONAZOLE 150 MG/1
TABLET ORAL
Qty: 2 TABLET | Refills: 0 | Status: SHIPPED | OUTPATIENT
Start: 2020-02-28 | End: 2020-07-20

## 2020-06-17 ENCOUNTER — APPOINTMENT (OUTPATIENT)
Dept: LAB | Facility: HOSPITAL | Age: 50
End: 2020-06-17

## 2020-06-17 ENCOUNTER — TELEPHONE (OUTPATIENT)
Dept: ONCOLOGY | Facility: CLINIC | Age: 50
End: 2020-06-17

## 2020-06-17 NOTE — TELEPHONE ENCOUNTER
Called pt per Dr. Perera's request and informed her he was okay with cancelling the appt for today as long as she was feeling good. However, if she is ever having any issues to call and we can set her up to see him if needed. She states she feels fine and V/U about calling in the future.

## 2020-07-09 ENCOUNTER — CONSULT (OUTPATIENT)
Dept: BARIATRICS/WEIGHT MGMT | Facility: CLINIC | Age: 50
End: 2020-07-09

## 2020-07-09 ENCOUNTER — PREP FOR SURGERY (OUTPATIENT)
Dept: OTHER | Facility: HOSPITAL | Age: 50
End: 2020-07-09

## 2020-07-09 VITALS
HEART RATE: 83 BPM | HEIGHT: 61 IN | BODY MASS INDEX: 50.03 KG/M2 | DIASTOLIC BLOOD PRESSURE: 92 MMHG | SYSTOLIC BLOOD PRESSURE: 143 MMHG | TEMPERATURE: 96.9 F | WEIGHT: 265 LBS | RESPIRATION RATE: 18 BRPM

## 2020-07-09 DIAGNOSIS — R53.82 CHRONIC FATIGUE: ICD-10-CM

## 2020-07-09 DIAGNOSIS — E66.01 CLASS 3 SEVERE OBESITY DUE TO EXCESS CALORIES WITH SERIOUS COMORBIDITY AND BODY MASS INDEX (BMI) OF 50.0 TO 59.9 IN ADULT (HCC): Primary | ICD-10-CM

## 2020-07-09 DIAGNOSIS — K31.7 GASTRIC POLYPS: ICD-10-CM

## 2020-07-09 DIAGNOSIS — E78.5 HYPERLIPIDEMIA, UNSPECIFIED HYPERLIPIDEMIA TYPE: ICD-10-CM

## 2020-07-09 DIAGNOSIS — M25.50 MULTIPLE JOINT PAIN: ICD-10-CM

## 2020-07-09 DIAGNOSIS — R73.03 PREDIABETES: ICD-10-CM

## 2020-07-09 DIAGNOSIS — K21.9 GASTROESOPHAGEAL REFLUX DISEASE WITHOUT ESOPHAGITIS: ICD-10-CM

## 2020-07-09 DIAGNOSIS — G47.33 OSA (OBSTRUCTIVE SLEEP APNEA): ICD-10-CM

## 2020-07-09 PROBLEM — E66.813 CLASS 3 SEVERE OBESITY DUE TO EXCESS CALORIES WITH SERIOUS COMORBIDITY AND BODY MASS INDEX (BMI) OF 50.0 TO 59.9 IN ADULT: Status: ACTIVE | Noted: 2020-07-09

## 2020-07-09 PROCEDURE — 99215 OFFICE O/P EST HI 40 MIN: CPT | Performed by: SURGERY

## 2020-07-09 RX ORDER — CEFAZOLIN SODIUM IN 0.9 % NACL 3 G/100 ML
3 INTRAVENOUS SOLUTION, PIGGYBACK (ML) INTRAVENOUS
Status: CANCELLED | OUTPATIENT
Start: 2020-07-22

## 2020-07-09 RX ORDER — URSODIOL 300 MG/1
300 CAPSULE ORAL 2 TIMES DAILY
Qty: 60 CAPSULE | Refills: 5 | Status: SHIPPED | OUTPATIENT
Start: 2020-07-09 | End: 2020-11-05

## 2020-07-09 RX ORDER — SODIUM CHLORIDE 0.9 % (FLUSH) 0.9 %
3 SYRINGE (ML) INJECTION EVERY 12 HOURS SCHEDULED
Status: CANCELLED | OUTPATIENT
Start: 2020-07-09

## 2020-07-09 RX ORDER — ACETAMINOPHEN 160 MG/5ML
975 SOLUTION ORAL ONCE
Status: CANCELLED | OUTPATIENT
Start: 2020-07-22 | End: 2020-07-09

## 2020-07-09 RX ORDER — CHLORHEXIDINE GLUCONATE 0.12 MG/ML
15 RINSE ORAL SEE ADMIN INSTRUCTIONS
Status: CANCELLED | OUTPATIENT
Start: 2020-07-22

## 2020-07-09 RX ORDER — SCOLOPAMINE TRANSDERMAL SYSTEM 1 MG/1
1 PATCH, EXTENDED RELEASE TRANSDERMAL CONTINUOUS
Status: CANCELLED | OUTPATIENT
Start: 2020-07-22 | End: 2020-07-25

## 2020-07-09 RX ORDER — SODIUM CHLORIDE 0.9 % (FLUSH) 0.9 %
3-10 SYRINGE (ML) INJECTION AS NEEDED
Status: CANCELLED | OUTPATIENT
Start: 2020-07-22

## 2020-07-09 RX ORDER — PANTOPRAZOLE SODIUM 40 MG/10ML
40 INJECTION, POWDER, LYOPHILIZED, FOR SOLUTION INTRAVENOUS ONCE
Status: CANCELLED | OUTPATIENT
Start: 2020-07-22 | End: 2020-07-09

## 2020-07-09 RX ORDER — MELOXICAM 7.5 MG/1
TABLET ORAL
COMMUNITY
Start: 2020-06-16 | End: 2020-08-20

## 2020-07-09 RX ORDER — SODIUM CHLORIDE, SODIUM LACTATE, POTASSIUM CHLORIDE, CALCIUM CHLORIDE 600; 310; 30; 20 MG/100ML; MG/100ML; MG/100ML; MG/100ML
100 INJECTION, SOLUTION INTRAVENOUS CONTINUOUS
Status: CANCELLED | OUTPATIENT
Start: 2020-07-22

## 2020-07-09 RX ORDER — METOCLOPRAMIDE HYDROCHLORIDE 5 MG/ML
10 INJECTION INTRAMUSCULAR; INTRAVENOUS ONCE
Status: CANCELLED | OUTPATIENT
Start: 2020-07-22 | End: 2020-07-09

## 2020-07-09 NOTE — PATIENT INSTRUCTIONS
Bariatric Manual    You were provided a manual specific to the procedure that you have chosen.  Please refer to that with any questions or call the office at [phone]

## 2020-07-09 NOTE — H&P
Bariatric Consult:  Referred by Raz Floyd MD    Armin Viramontes is here today for consult on Consult (SLEEVE CONSULTATION )      History of Present Illness:     Armin Viramontes is a 50 y.o. female with morbid obesity with co-morbidities including sleep apnea, hypertension, dyslipidemia, osteoarthritis, back pain, knee pain and GERD who presents for surgical consultation for the above procedure. Armin has completed the initial intake visit and has been examined by our nurse practitioner, dietician, psychologist and underwent the extensive educational teaching process under the guidance of our bariatric coordinator and myself. Armin also has seen the educational video JOSE DE JESUS on the surgical procedure if available. Armin attended today more educational teaching from our bariatric coordinator and myself. Armin has had an extensive medical workup including a visit with their primary care physician, EKG, chest radiograph, blood work, EGD or UGI and possibly further testing. These have been reviewed by me and discussed with the patient. Armin is now ready to proceed with surgery. Armin presently denies nausea, vomiting, fever, chills, chest pain, shortness of air, melena, hematochezia, hemetemesis, dysuria, frequency, hematuria, jaundice or abdominal pain.       Past Medical History:   Diagnosis Date   • Anemia    • Arthritis    • GERD (gastroesophageal reflux disease)    • Hair loss    • History of obesity    • HPV in female 2012   • Hyperlipidemia    • Infectious mononucleosis    • Lymphadenopathy 02/2019    Right axilla   • JAZMYN (obstructive sleep apnea)    • Patient exposure to body fluids    • Plantar fasciitis, bilateral    • Seasonal allergies        Encounter Diagnoses   Name Primary?   • Class 3 severe obesity due to excess calories with serious comorbidity and body mass index (BMI) of 50.0 to 59.9 in adult (CMS/HCC) Yes   • JAZMYN (obstructive sleep apnea)    • Prediabetes    • Hyperlipidemia, unspecified  hyperlipidemia type    • Multiple joint pain    • Gastric polyps    • Chronic fatigue    • Gastroesophageal reflux disease without esophagitis        Past Surgical History:   Procedure Laterality Date   • CARDIAC CATHETERIZATION N/A 11/15/2019    Procedure: Right Heart Cath;  Surgeon: Leandra Yanes MD;  Location:  [facility] CATH INVASIVE LOCATION;  Service: Cardiology   • CARDIAC CATHETERIZATION N/A 11/15/2019    Procedure: Left Heart Cath;  Surgeon: Leandra Yanes MD;  Location:  [facility] CATH INVASIVE LOCATION;  Service: Cardiology   • CARDIAC CATHETERIZATION N/A 11/15/2019    Procedure: Left ventriculography;  Surgeon: Leandra Yanes MD;  Location:  [facility] CATH INVASIVE LOCATION;  Service: Cardiology   • CARDIAC CATHETERIZATION N/A 11/15/2019    Procedure: Coronary angiography;  Surgeon: Leandra Yanes MD;  [facility];  Service: Cardiology   • ENDOSCOPY N/A 2/11/2020    Procedure: ESOPHAGOGASTRODUODENOSCOPY WITH BIOPSY;  Surgeon: Andrey Stafford Jr., MD;  Location: [facility] ENDOSCOPY;  Service: General;  Laterality: N/A;  PRE- GERD  POST-GASTRITIS, GASTRIC POLYPS   • ESSURE TUBAL LIGATION     • LASIK Bilateral    • TONSILLECTOMY         Patient Active Problem List   Diagnosis   • Seasonal allergies   • Hyperlipidemia   • GERD (gastroesophageal reflux disease)   • HPV in female   • Family history of ovarian cancer   • Lymphadenopathy   • Iron deficiency anemia due to chronic blood loss   • Insomnia   • Chronic fatigue   • JAZMYN (obstructive sleep apnea)   • Multiple joint pain   • Prediabetes   • SOB (shortness of breath)   • Leg swelling   • Chest pain, atypical   • Gastric polyps   • Class 3 severe obesity due to excess calories with serious comorbidity and body mass index (BMI) of 50.0 to 59.9 in adult (CMS/[facility])       Allergies   Allergen Reactions   • Erythromycin Other (See Comments)     tachycardia   • Onion Photosensitivity   • Stahist  [Dexchlorpheniramine-Phenyleph] Other (See Comments)     tachacardia         Current Outpatient Medications:   •  CBD (cannabidiol) oral oil, Take  by mouth., Disp: , Rfl:   •  cetirizine (zyrTEC) 10 MG tablet, Take 10 mg by mouth Daily., Disp: , Rfl:   •  fluticasone (FLONASE) 50 MCG/ACT nasal spray, 1 spray into the nostril(s) as directed by provider Daily., Disp: , Rfl:   •  meloxicam (MOBIC) 7.5 MG tablet, , Disp: , Rfl:   •  omeprazole (priLOSEC) 40 MG capsule, Take 40 mg by mouth Daily., Disp: , Rfl:   •  Probiotic Product (PROBIOTIC-10 PO), Take  by mouth., Disp: , Rfl:   •  enoxaparin (Lovenox) 40 MG/0.4ML solution syringe, Inject 0.4 mL under the skin into the appropriate area as directed Daily for 14 days. Start after surgery unless instructed otherwise, Disp: 14 syringe, Rfl: 0  •  fluconazole (DIFLUCAN) 150 MG tablet, TAKE ONE TABLET TODAY AND REPEAT IN THREE DAYS if needed, Disp: 2 tablet, Rfl: 0  •  folic acid-vit B6-vit B12 (FOLBEE) 2.5-25-1 MG tablet tablet, Take 1 tablet by mouth Daily., Disp: 40 tablet, Rfl: 0  •  Multiple Vitamins-Minerals (MULTIVITAMIN ADULT PO), Take 1 tablet by mouth Daily., Disp: , Rfl:   •  ursodiol (Actigall) 300 MG capsule, Take 1 capsule by mouth 2 (Two) Times a Day., Disp: 60 capsule, Rfl: 5    Social History     Socioeconomic History   • Marital status: Single     Spouse name: Not on file   • Number of children: Not on file   • Years of education: College   • Highest education level: Not on file   Occupational History   • Occupation: Director     Employer: [employer]   Tobacco Use   • Smoking status: Never Smoker   • Smokeless tobacco: Never Used   Substance and Sexual Activity   • Alcohol use: Yes     Frequency: Monthly or less     Comment: social   • Drug use: No   • Sexual activity: Defer     Partners: Male     Birth control/protection: Surgical       Family History   Problem Relation Age of Onset   • Hypertension Mother    • Hyperlipidemia Mother    • Breast  cancer Mother 62        DCIS   • Thyroid disease Mother    • Diabetes Father    • Hyperlipidemia Father    • Heart attack Father    • Stroke Father    • Heart disease Father         CHF   • Obesity Father    • Endometriosis Maternal Grandmother    • Ovarian cancer Maternal Grandmother 49        gyn ? ovarian   • Osteoporosis Maternal Grandfather        Review of Systems:  Review of Systems   Constitutional: Positive for fatigue.   Gastrointestinal: Positive for diarrhea.   Musculoskeletal: Positive for arthralgias.   All other systems reviewed and are negative.        Physical Exam:    Vital Signs:  Weight: 120 kg (265 lb)   Body mass index is 50.1 kg/m².  Temp: 96.9 °F (36.1 °C)   Heart Rate: 83   BP: 143/92       Physical Exam   Constitutional: She is oriented to person, place, and time. She appears well-nourished.   HENT:   Head: Normocephalic and atraumatic.   Mouth/Throat: Oropharynx is clear and moist.   Eyes: Pupils are equal, round, and reactive to light. Conjunctivae and EOM are normal. No scleral icterus.   Neck: Normal range of motion. Neck supple. No thyromegaly present.   Cardiovascular: Normal rate and regular rhythm.   Pulmonary/Chest: Effort normal and breath sounds normal.   Abdominal: Soft. Bowel sounds are normal. She exhibits no distension and no mass. There is no tenderness. There is no rebound and no guarding. No hernia.   Musculoskeletal: Normal range of motion.   Lymphadenopathy:     She has no cervical adenopathy.   Neurological: She is alert and oriented to person, place, and time. No cranial nerve deficit. Coordination normal.   Skin: Skin is warm and dry. No erythema.   Psychiatric: She has a normal mood and affect. Her behavior is normal.   Vitals reviewed.        Assessment:    Armin Viramontes is a 50 y.o. year old female with medically complicated severe obesity with a BMI of Body mass index is 50.1 kg/m². and multiple co-morbidities listed in the encounter diagnosis.    I think she is  an appropriate candidate for this surgery, and is ready to proceed.      Plan/Discussion/Summary:  No hiatal hernia per me.  Patient does take PPI.  H. pylori negative    The patient has returned to the office for a surgical consultation and has requested to proceed with a laparoscopic gastric sleeve.  I have had the opportunity to obtain a history, examine the patient and review the patient's chart.    The patient understands that surgery is a tool and that weight loss is not guaranteed but only seen in the context of appropriate use, regular follow up, exercise and making appropriate food choices.     I personally discussed the potential complications of the laparoscopic gastric sleeve with this patient.  The patient is well aware of potential complications of the surgery that include but not limited to bleeding, infections, deep vein thrombosis, pulmonary embolism, pulmonary complications such as pneumonia, cardiac event, hernias, small bowel obstruction, damage to the spleen or other organs, bowel injury, disfiguring scars, failure to lose weight, need for additional surgery, conversion to an open procedure and death.  The patient is also aware of complications which apply in particular to the gastric sleeve and can include but not limited to the leakage of gastric contents at the staple line, the development of an intra-abdominal abscess, gastroesophageal reflux disease, Chavez's esophagus, ulcers, vitamin/mineral deficiencies, strictures, and the possibility of converting this procedure to a Grace-en-Y gastric bypass. The patient also understands the possibility of requiring an acid reducer medication for the rest of their life.    The risks, benefits, potential complications and alternative therapies were discussed at great length as outlined in our extensive consent forms, online consent and educational teaching processes.    The patient has confirmed the participation in the programs extensive educational  activities.    All questions and concerns were answered to patient's satisfaction.  The patient now wishes to proceed with surgery.    Patient has declined the pre-operative insertion of an IVC filter.     The patient has agreed to a postoperative course of anitcoagulant therapy.      I instructed patient to start on a H2 blocker or proton pump inhibitor if not already on one of these medications.    I explained in detail the procedures that we perform.  All of these procedures have a chance to convert to open if any technical challenges or complications do occur.  Bariatric surgery is not cosmetic surgery but rather a tool to help a patient make a life-long commitment lifestyle change including diet, exercise, behavior changes, and taking supplemental vitamins and minerals.    Problems after surgery may require more operations to correct them.    The risks, benefits, alternatives, and potential complications of all of the procedures were explained in detail including, but not limited to death, anesthesia and medication adverse effect, deep venous thrombosis, pulmonary embolism, trocar site/incisional hernia, wound infection, abdominal infection, bleeding, failure to lose weight, gain weight, a change in body image, metabolic complications with vitamin deficiences and anemia.    Weight loss expectations were discussed with the patient in detail. The weight loss operations most commonly performed are the sleeve gastrectomy and the Grace-en-Y gastric bypass. These operations result in weight losses up to approximately 25-35% of initial body weight 12 to 24 months after surgery with the gastric bypass usually the higher percent of weight loss but depends on patient using the tool.    For the gastric bypass and loop duodenal switch (JOSE JUAN-S) the risks include but not limited to the following early complications:  Anastomotic leak/peritonitis, Grace/Alimentary/biliopancreatic limb obstruction, severe & minor wound  infection/seroma, and nausea/vomiting.  Late complications can include but are not limited to malnutrition, vitamin deficiencies, frequent loose stools,  stomal stenosis, marginal ulcer, bowel obstruction, intussusception, internal, and incisional hernia.    Regarding the gastric sleeve, there is less long-term outcome data and higher risk of dysphagia and reflux compared to a gastric bypass, as well as risk of internal visceral/organ injury, splenectomy, bleeding, infection, leak (which could require further intervention possible conversion to Grace-en-Y gastric bypass), stenosis and possibility of regaining weight.    Armin was counseled regarding diagnostic results, instructions for management, risk factor reductions, prognosis, patient and family education, impressions, risks and benefits of treatment options and importance of compliance with treatment. Total face to face time of the encounter was over 45 minutes and over 30 minutes was spent counseling.     Jose Luis Report   As part of this patient's treatment plan I am prescribing controlled substances. The patient has been made aware of appropriate use of such medications, including potential risk of somnolence, limited ability to drive and /or work safely, and potential for dependence or overdose. It has also been made clear that these medications are for use by this patient only, without concomitant use of alcohol or other substances unless prescribed.    Armin has completed prescribing agreement detailing terms of continued prescribing of controlled substances, including monitoring JOSE LUIS reports, urine drug screening, and pill counts if necessary. Armin is aware that inappropriate use will result in cessation of prescribing such medications.    JOSE LUIS report has been reviewed      History and physical exam exhibit continued safe and appropriate use of controlled substances.      Armin understands the surgical procedures and the different surgical options that  are available.  She understands the lifestyle changes that are required after surgery and has agreed to follow the guidelines outlined in the weight management program.  She also expressed understanding of the risks involved and had all of female questions answered and desires to proceed.      Andrey Stafford MD  7/9/2020

## 2020-07-20 ENCOUNTER — APPOINTMENT (OUTPATIENT)
Dept: PREADMISSION TESTING | Facility: HOSPITAL | Age: 50
End: 2020-07-20

## 2020-07-20 VITALS
HEIGHT: 61 IN | TEMPERATURE: 98.2 F | OXYGEN SATURATION: 97 % | RESPIRATION RATE: 16 BRPM | BODY MASS INDEX: 50.07 KG/M2 | DIASTOLIC BLOOD PRESSURE: 83 MMHG | SYSTOLIC BLOOD PRESSURE: 133 MMHG | HEART RATE: 78 BPM

## 2020-07-20 DIAGNOSIS — E66.01 CLASS 3 SEVERE OBESITY DUE TO EXCESS CALORIES WITH SERIOUS COMORBIDITY AND BODY MASS INDEX (BMI) OF 50.0 TO 59.9 IN ADULT (HCC): ICD-10-CM

## 2020-07-20 LAB
ALBUMIN SERPL-MCNC: 4.1 G/DL (ref 3.5–5.2)
ALBUMIN/GLOB SERPL: 1.2 G/DL
ALP SERPL-CCNC: 73 U/L (ref 39–117)
ALT SERPL W P-5'-P-CCNC: 27 U/L (ref 1–33)
ANION GAP SERPL CALCULATED.3IONS-SCNC: 10.7 MMOL/L (ref 5–15)
AST SERPL-CCNC: 22 U/L (ref 1–32)
BILIRUB SERPL-MCNC: 0.3 MG/DL (ref 0–1.2)
BUN SERPL-MCNC: 16 MG/DL (ref 6–20)
BUN/CREAT SERPL: 19.5 (ref 7–25)
CALCIUM SPEC-SCNC: 9.5 MG/DL (ref 8.6–10.5)
CHLORIDE SERPL-SCNC: 100 MMOL/L (ref 98–107)
CO2 SERPL-SCNC: 22.3 MMOL/L (ref 22–29)
CREAT SERPL-MCNC: 0.82 MG/DL (ref 0.57–1)
DEPRECATED RDW RBC AUTO: 40.8 FL (ref 37–54)
ERYTHROCYTE [DISTWIDTH] IN BLOOD BY AUTOMATED COUNT: 14 % (ref 12.3–15.4)
GFR SERPL CREATININE-BSD FRML MDRD: 74 ML/MIN/1.73
GLOBULIN UR ELPH-MCNC: 3.4 GM/DL
GLUCOSE SERPL-MCNC: 102 MG/DL (ref 65–99)
HCG SERPL QL: NEGATIVE
HCT VFR BLD AUTO: 36.7 % (ref 34–46.6)
HGB BLD-MCNC: 12.2 G/DL (ref 12–15.9)
MCH RBC QN AUTO: 27.1 PG (ref 26.6–33)
MCHC RBC AUTO-ENTMCNC: 33.2 G/DL (ref 31.5–35.7)
MCV RBC AUTO: 81.6 FL (ref 79–97)
PLATELET # BLD AUTO: 357 10*3/MM3 (ref 140–450)
PMV BLD AUTO: 9.8 FL (ref 6–12)
POTASSIUM SERPL-SCNC: 3.8 MMOL/L (ref 3.5–5.2)
PROT SERPL-MCNC: 7.5 G/DL (ref 6–8.5)
RBC # BLD AUTO: 4.5 10*6/MM3 (ref 3.77–5.28)
SODIUM SERPL-SCNC: 133 MMOL/L (ref 136–145)
WBC # BLD AUTO: 10.73 10*3/MM3 (ref 3.4–10.8)

## 2020-07-20 PROCEDURE — 80053 COMPREHEN METABOLIC PANEL: CPT | Performed by: SURGERY

## 2020-07-20 PROCEDURE — 93005 ELECTROCARDIOGRAM TRACING: CPT

## 2020-07-20 PROCEDURE — 84703 CHORIONIC GONADOTROPIN ASSAY: CPT | Performed by: SURGERY

## 2020-07-20 PROCEDURE — U0004 COV-19 TEST NON-CDC HGH THRU: HCPCS | Performed by: NURSE PRACTITIONER

## 2020-07-20 PROCEDURE — C9803 HOPD COVID-19 SPEC COLLECT: HCPCS

## 2020-07-20 PROCEDURE — 36415 COLL VENOUS BLD VENIPUNCTURE: CPT

## 2020-07-20 PROCEDURE — 93010 ELECTROCARDIOGRAM REPORT: CPT | Performed by: INTERNAL MEDICINE

## 2020-07-20 PROCEDURE — 85027 COMPLETE CBC AUTOMATED: CPT | Performed by: SURGERY

## 2020-07-21 LAB
REF LAB TEST METHOD: NORMAL
SARS-COV-2 RNA RESP QL NAA+PROBE: NOT DETECTED

## 2020-07-22 ENCOUNTER — ANESTHESIA (OUTPATIENT)
Dept: PERIOP | Facility: HOSPITAL | Age: 50
End: 2020-07-22

## 2020-07-22 ENCOUNTER — HOSPITAL ENCOUNTER (INPATIENT)
Facility: HOSPITAL | Age: 50
LOS: 1 days | Discharge: HOME | End: 2020-07-23
Attending: SURGERY | Admitting: SURGERY

## 2020-07-22 ENCOUNTER — ANESTHESIA EVENT (OUTPATIENT)
Dept: PERIOP | Facility: HOSPITAL | Age: 50
End: 2020-07-22

## 2020-07-22 DIAGNOSIS — E66.01 CLASS 3 SEVERE OBESITY DUE TO EXCESS CALORIES WITH SERIOUS COMORBIDITY AND BODY MASS INDEX (BMI) OF 50.0 TO 59.9 IN ADULT (HCC): ICD-10-CM

## 2020-07-22 DIAGNOSIS — Z98.84 S/P LAPAROSCOPIC SLEEVE GASTRECTOMY: Primary | ICD-10-CM

## 2020-07-22 PROCEDURE — 25010000002 PROPOFOL 10 MG/ML EMULSION: Performed by: NURSE ANESTHETIST, CERTIFIED REGISTERED

## 2020-07-22 PROCEDURE — 25010000002 FENTANYL CITRATE (PF) 100 MCG/2ML SOLUTION: Performed by: NURSE ANESTHETIST, CERTIFIED REGISTERED

## 2020-07-22 PROCEDURE — 25010000002 ONDANSETRON PER 1 MG: Performed by: SURGERY

## 2020-07-22 PROCEDURE — 25010000002 PROMETHAZINE PER 50 MG: Performed by: NURSE ANESTHETIST, CERTIFIED REGISTERED

## 2020-07-22 PROCEDURE — 88307 TISSUE EXAM BY PATHOLOGIST: CPT | Performed by: SURGERY

## 2020-07-22 PROCEDURE — 25010000002 DEXAMETHASONE PER 1 MG: Performed by: NURSE ANESTHETIST, CERTIFIED REGISTERED

## 2020-07-22 PROCEDURE — 25010000002 PHENYLEPHRINE PER 1 ML: Performed by: NURSE ANESTHETIST, CERTIFIED REGISTERED

## 2020-07-22 PROCEDURE — 25010000002 MIDAZOLAM PER 1 MG: Performed by: ANESTHESIOLOGY

## 2020-07-22 PROCEDURE — 43775 LAP SLEEVE GASTRECTOMY: CPT | Performed by: SURGERY

## 2020-07-22 PROCEDURE — 25010000002 ENOXAPARIN PER 10 MG: Performed by: SURGERY

## 2020-07-22 PROCEDURE — 0DB64Z3 EXCISION OF STOMACH, PERCUTANEOUS ENDOSCOPIC APPROACH, VERTICAL: ICD-10-PCS | Performed by: SURGERY

## 2020-07-22 PROCEDURE — 25010000002 ONDANSETRON PER 1 MG: Performed by: NURSE ANESTHETIST, CERTIFIED REGISTERED

## 2020-07-22 PROCEDURE — 25010000002 METOCLOPRAMIDE PER 10 MG: Performed by: SURGERY

## 2020-07-22 PROCEDURE — 25010000002 HYDROMORPHONE PER 4 MG: Performed by: NURSE ANESTHETIST, CERTIFIED REGISTERED

## 2020-07-22 PROCEDURE — 43775 LAP SLEEVE GASTRECTOMY: CPT | Performed by: NURSE PRACTITIONER

## 2020-07-22 PROCEDURE — 25010000002 HYDROMORPHONE PER 4 MG: Performed by: SURGERY

## 2020-07-22 DEVICE — STPL/LN REINF PERISTRIP DRY VERITAS SECUREGRIP THN: Type: IMPLANTABLE DEVICE | Site: STOMACH | Status: FUNCTIONAL

## 2020-07-22 DEVICE — ENDOPATH ECHELON ENDOSCOPIC LINEAR CUTTER RELOADS, GREEN, 60MM
Type: IMPLANTABLE DEVICE | Site: STOMACH | Status: FUNCTIONAL
Brand: ECHELON ENDOPATH

## 2020-07-22 DEVICE — SEALANT WND FIBRIN TISSEEL PREFIL/SYR/PRIMAFZ 4ML: Type: IMPLANTABLE DEVICE | Site: STOMACH | Status: FUNCTIONAL

## 2020-07-22 RX ORDER — HYDRALAZINE HYDROCHLORIDE 20 MG/ML
5 INJECTION INTRAMUSCULAR; INTRAVENOUS
Status: DISCONTINUED | OUTPATIENT
Start: 2020-07-22 | End: 2020-07-22 | Stop reason: HOSPADM

## 2020-07-22 RX ORDER — LIDOCAINE HYDROCHLORIDE 10 MG/ML
0.5 INJECTION, SOLUTION EPIDURAL; INFILTRATION; INTRACAUDAL; PERINEURAL ONCE AS NEEDED
Status: DISCONTINUED | OUTPATIENT
Start: 2020-07-22 | End: 2020-07-22 | Stop reason: HOSPADM

## 2020-07-22 RX ORDER — DIPHENHYDRAMINE HYDROCHLORIDE 50 MG/ML
12.5 INJECTION INTRAMUSCULAR; INTRAVENOUS
Status: DISCONTINUED | OUTPATIENT
Start: 2020-07-22 | End: 2020-07-22 | Stop reason: HOSPADM

## 2020-07-22 RX ORDER — CYANOCOBALAMIN 1000 UG/ML
1000 INJECTION, SOLUTION INTRAMUSCULAR; SUBCUTANEOUS ONCE
Status: COMPLETED | OUTPATIENT
Start: 2020-07-23 | End: 2020-07-23

## 2020-07-22 RX ORDER — FAMOTIDINE 10 MG/ML
20 INJECTION, SOLUTION INTRAVENOUS ONCE
Status: COMPLETED | OUTPATIENT
Start: 2020-07-22 | End: 2020-07-22

## 2020-07-22 RX ORDER — PROPOFOL 10 MG/ML
VIAL (ML) INTRAVENOUS AS NEEDED
Status: DISCONTINUED | OUTPATIENT
Start: 2020-07-22 | End: 2020-07-22 | Stop reason: SURG

## 2020-07-22 RX ORDER — CEFAZOLIN SODIUM IN 0.9 % NACL 3 G/100 ML
3 INTRAVENOUS SOLUTION, PIGGYBACK (ML) INTRAVENOUS
Status: COMPLETED | OUTPATIENT
Start: 2020-07-22 | End: 2020-07-22

## 2020-07-22 RX ORDER — CHLORHEXIDINE GLUCONATE 0.12 MG/ML
15 RINSE ORAL SEE ADMIN INSTRUCTIONS
Status: COMPLETED | OUTPATIENT
Start: 2020-07-22 | End: 2020-07-22

## 2020-07-22 RX ORDER — ONDANSETRON 4 MG/1
4 TABLET, FILM COATED ORAL EVERY 4 HOURS PRN
Status: DISCONTINUED | OUTPATIENT
Start: 2020-07-22 | End: 2020-07-23 | Stop reason: HOSPADM

## 2020-07-22 RX ORDER — FENTANYL CITRATE 50 UG/ML
50 INJECTION, SOLUTION INTRAMUSCULAR; INTRAVENOUS
Status: DISCONTINUED | OUTPATIENT
Start: 2020-07-22 | End: 2020-07-22 | Stop reason: HOSPADM

## 2020-07-22 RX ORDER — SODIUM CHLORIDE 0.9 % (FLUSH) 0.9 %
3 SYRINGE (ML) INJECTION EVERY 12 HOURS SCHEDULED
Status: DISCONTINUED | OUTPATIENT
Start: 2020-07-22 | End: 2020-07-22 | Stop reason: HOSPADM

## 2020-07-22 RX ORDER — HYDROMORPHONE HYDROCHLORIDE 2 MG/1
2 TABLET ORAL EVERY 4 HOURS PRN
Status: DISCONTINUED | OUTPATIENT
Start: 2020-07-22 | End: 2020-07-23 | Stop reason: HOSPADM

## 2020-07-22 RX ORDER — BUPIVACAINE HYDROCHLORIDE AND EPINEPHRINE 5; 5 MG/ML; UG/ML
INJECTION, SOLUTION PERINEURAL AS NEEDED
Status: DISCONTINUED | OUTPATIENT
Start: 2020-07-22 | End: 2020-07-22 | Stop reason: HOSPADM

## 2020-07-22 RX ORDER — LABETALOL HYDROCHLORIDE 5 MG/ML
5 INJECTION, SOLUTION INTRAVENOUS
Status: DISCONTINUED | OUTPATIENT
Start: 2020-07-22 | End: 2020-07-22 | Stop reason: HOSPADM

## 2020-07-22 RX ORDER — HYDROCODONE BITARTRATE AND ACETAMINOPHEN 7.5; 325 MG/1; MG/1
1 TABLET ORAL ONCE AS NEEDED
Status: DISCONTINUED | OUTPATIENT
Start: 2020-07-22 | End: 2020-07-22 | Stop reason: HOSPADM

## 2020-07-22 RX ORDER — NALOXONE HCL 0.4 MG/ML
0.1 VIAL (ML) INJECTION
Status: DISCONTINUED | OUTPATIENT
Start: 2020-07-22 | End: 2020-07-23 | Stop reason: HOSPADM

## 2020-07-22 RX ORDER — MORPHINE SULFATE 2 MG/ML
2 INJECTION, SOLUTION INTRAMUSCULAR; INTRAVENOUS
Status: DISCONTINUED | OUTPATIENT
Start: 2020-07-22 | End: 2020-07-23 | Stop reason: HOSPADM

## 2020-07-22 RX ORDER — METOCLOPRAMIDE HYDROCHLORIDE 5 MG/ML
10 INJECTION INTRAMUSCULAR; INTRAVENOUS ONCE
Status: COMPLETED | OUTPATIENT
Start: 2020-07-22 | End: 2020-07-22

## 2020-07-22 RX ORDER — FENTANYL CITRATE 50 UG/ML
INJECTION, SOLUTION INTRAMUSCULAR; INTRAVENOUS AS NEEDED
Status: DISCONTINUED | OUTPATIENT
Start: 2020-07-22 | End: 2020-07-22 | Stop reason: SURG

## 2020-07-22 RX ORDER — PROMETHAZINE HYDROCHLORIDE 25 MG/ML
12.5 INJECTION, SOLUTION INTRAMUSCULAR; INTRAVENOUS ONCE AS NEEDED
Status: DISCONTINUED | OUTPATIENT
Start: 2020-07-22 | End: 2020-07-22 | Stop reason: HOSPADM

## 2020-07-22 RX ORDER — LIDOCAINE HYDROCHLORIDE 20 MG/ML
INJECTION, SOLUTION INFILTRATION; PERINEURAL AS NEEDED
Status: DISCONTINUED | OUTPATIENT
Start: 2020-07-22 | End: 2020-07-22 | Stop reason: SURG

## 2020-07-22 RX ORDER — GABAPENTIN 300 MG/1
300 CAPSULE ORAL EVERY 8 HOURS SCHEDULED
Status: DISCONTINUED | OUTPATIENT
Start: 2020-07-22 | End: 2020-07-23 | Stop reason: HOSPADM

## 2020-07-22 RX ORDER — SCOLOPAMINE TRANSDERMAL SYSTEM 1 MG/1
1 PATCH, EXTENDED RELEASE TRANSDERMAL CONTINUOUS
Status: DISCONTINUED | OUTPATIENT
Start: 2020-07-22 | End: 2020-07-23 | Stop reason: HOSPADM

## 2020-07-22 RX ORDER — DIPHENHYDRAMINE HCL 25 MG
25 CAPSULE ORAL
Status: DISCONTINUED | OUTPATIENT
Start: 2020-07-22 | End: 2020-07-22 | Stop reason: HOSPADM

## 2020-07-22 RX ORDER — SODIUM CHLORIDE 0.9 % (FLUSH) 0.9 %
3 SYRINGE (ML) INJECTION EVERY 12 HOURS SCHEDULED
Status: DISCONTINUED | OUTPATIENT
Start: 2020-07-22 | End: 2020-07-23 | Stop reason: HOSPADM

## 2020-07-22 RX ORDER — DEXMEDETOMIDINE HYDROCHLORIDE 4 UG/ML
INJECTION INTRAVENOUS CONTINUOUS PRN
Status: DISCONTINUED | OUTPATIENT
Start: 2020-07-22 | End: 2020-07-22 | Stop reason: SURG

## 2020-07-22 RX ORDER — OXYCODONE AND ACETAMINOPHEN 7.5; 325 MG/1; MG/1
1 TABLET ORAL ONCE AS NEEDED
Status: DISCONTINUED | OUTPATIENT
Start: 2020-07-22 | End: 2020-07-22 | Stop reason: HOSPADM

## 2020-07-22 RX ORDER — ACETAMINOPHEN 160 MG/5ML
975 SOLUTION ORAL ONCE
Status: COMPLETED | OUTPATIENT
Start: 2020-07-22 | End: 2020-07-22

## 2020-07-22 RX ORDER — ACETAMINOPHEN 650 MG/1
650 SUPPOSITORY RECTAL ONCE AS NEEDED
Status: DISCONTINUED | OUTPATIENT
Start: 2020-07-22 | End: 2020-07-22 | Stop reason: HOSPADM

## 2020-07-22 RX ORDER — PROMETHAZINE HYDROCHLORIDE 25 MG/1
25 SUPPOSITORY RECTAL ONCE AS NEEDED
Status: DISCONTINUED | OUTPATIENT
Start: 2020-07-22 | End: 2020-07-22 | Stop reason: HOSPADM

## 2020-07-22 RX ORDER — PROMETHAZINE HYDROCHLORIDE 25 MG/ML
12.5 INJECTION, SOLUTION INTRAMUSCULAR; INTRAVENOUS EVERY 4 HOURS PRN
Status: DISCONTINUED | OUTPATIENT
Start: 2020-07-22 | End: 2020-07-23 | Stop reason: HOSPADM

## 2020-07-22 RX ORDER — DEXAMETHASONE SODIUM PHOSPHATE 10 MG/ML
INJECTION INTRAMUSCULAR; INTRAVENOUS AS NEEDED
Status: DISCONTINUED | OUTPATIENT
Start: 2020-07-22 | End: 2020-07-22 | Stop reason: SURG

## 2020-07-22 RX ORDER — HYDROMORPHONE HYDROCHLORIDE 1 MG/ML
0.5 INJECTION, SOLUTION INTRAMUSCULAR; INTRAVENOUS; SUBCUTANEOUS
Status: DISCONTINUED | OUTPATIENT
Start: 2020-07-22 | End: 2020-07-22 | Stop reason: HOSPADM

## 2020-07-22 RX ORDER — PROMETHAZINE HYDROCHLORIDE 25 MG/1
25 TABLET ORAL ONCE AS NEEDED
Status: DISCONTINUED | OUTPATIENT
Start: 2020-07-22 | End: 2020-07-22 | Stop reason: HOSPADM

## 2020-07-22 RX ORDER — ONDANSETRON 4 MG/1
4 TABLET, ORALLY DISINTEGRATING ORAL EVERY 4 HOURS PRN
Status: DISCONTINUED | OUTPATIENT
Start: 2020-07-22 | End: 2020-07-23 | Stop reason: HOSPADM

## 2020-07-22 RX ORDER — DIPHENHYDRAMINE HYDROCHLORIDE 50 MG/ML
25 INJECTION INTRAMUSCULAR; INTRAVENOUS EVERY 4 HOURS PRN
Status: DISCONTINUED | OUTPATIENT
Start: 2020-07-22 | End: 2020-07-23 | Stop reason: HOSPADM

## 2020-07-22 RX ORDER — MIRTAZAPINE 15 MG/1
15 TABLET, ORALLY DISINTEGRATING ORAL NIGHTLY
Status: DISCONTINUED | OUTPATIENT
Start: 2020-07-22 | End: 2020-07-23 | Stop reason: HOSPADM

## 2020-07-22 RX ORDER — ALBUTEROL SULFATE 2.5 MG/3ML
2.5 SOLUTION RESPIRATORY (INHALATION)
Status: DISCONTINUED | OUTPATIENT
Start: 2020-07-22 | End: 2020-07-22

## 2020-07-22 RX ORDER — LORAZEPAM 2 MG/ML
1 INJECTION INTRAMUSCULAR EVERY 12 HOURS PRN
Status: DISCONTINUED | OUTPATIENT
Start: 2020-07-22 | End: 2020-07-23 | Stop reason: HOSPADM

## 2020-07-22 RX ORDER — MAGNESIUM HYDROXIDE 1200 MG/15ML
LIQUID ORAL AS NEEDED
Status: DISCONTINUED | OUTPATIENT
Start: 2020-07-22 | End: 2020-07-22 | Stop reason: HOSPADM

## 2020-07-22 RX ORDER — SODIUM CHLORIDE 0.9 % (FLUSH) 0.9 %
3-10 SYRINGE (ML) INJECTION AS NEEDED
Status: DISCONTINUED | OUTPATIENT
Start: 2020-07-22 | End: 2020-07-22 | Stop reason: HOSPADM

## 2020-07-22 RX ORDER — HYDROMORPHONE HYDROCHLORIDE 1 MG/ML
0.5 INJECTION, SOLUTION INTRAMUSCULAR; INTRAVENOUS; SUBCUTANEOUS
Status: DISCONTINUED | OUTPATIENT
Start: 2020-07-22 | End: 2020-07-23 | Stop reason: HOSPADM

## 2020-07-22 RX ORDER — NALOXONE HCL 0.4 MG/ML
0.2 VIAL (ML) INJECTION AS NEEDED
Status: DISCONTINUED | OUTPATIENT
Start: 2020-07-22 | End: 2020-07-22 | Stop reason: HOSPADM

## 2020-07-22 RX ORDER — NITROGLYCERIN 0.4 MG/1
0.4 TABLET SUBLINGUAL
Status: DISCONTINUED | OUTPATIENT
Start: 2020-07-22 | End: 2020-07-23 | Stop reason: HOSPADM

## 2020-07-22 RX ORDER — MIDAZOLAM HYDROCHLORIDE 1 MG/ML
1 INJECTION INTRAMUSCULAR; INTRAVENOUS
Status: DISCONTINUED | OUTPATIENT
Start: 2020-07-22 | End: 2020-07-22 | Stop reason: HOSPADM

## 2020-07-22 RX ORDER — FAMOTIDINE 10 MG/ML
20 INJECTION, SOLUTION INTRAVENOUS EVERY 12 HOURS SCHEDULED
Status: DISCONTINUED | OUTPATIENT
Start: 2020-07-22 | End: 2020-07-23 | Stop reason: HOSPADM

## 2020-07-22 RX ORDER — NALOXONE HCL 0.4 MG/ML
0.4 VIAL (ML) INJECTION
Status: DISCONTINUED | OUTPATIENT
Start: 2020-07-22 | End: 2020-07-23 | Stop reason: HOSPADM

## 2020-07-22 RX ORDER — PANTOPRAZOLE SODIUM 40 MG/10ML
40 INJECTION, POWDER, LYOPHILIZED, FOR SOLUTION INTRAVENOUS ONCE
Status: COMPLETED | OUTPATIENT
Start: 2020-07-22 | End: 2020-07-22

## 2020-07-22 RX ORDER — SODIUM CHLORIDE 9 MG/ML
INJECTION, SOLUTION INTRAVENOUS AS NEEDED
Status: DISCONTINUED | OUTPATIENT
Start: 2020-07-22 | End: 2020-07-22 | Stop reason: HOSPADM

## 2020-07-22 RX ORDER — METOCLOPRAMIDE HYDROCHLORIDE 5 MG/ML
10 INJECTION INTRAMUSCULAR; INTRAVENOUS EVERY 6 HOURS
Status: DISCONTINUED | OUTPATIENT
Start: 2020-07-22 | End: 2020-07-23 | Stop reason: HOSPADM

## 2020-07-22 RX ORDER — ONDANSETRON 2 MG/ML
4 INJECTION INTRAMUSCULAR; INTRAVENOUS EVERY 4 HOURS PRN
Status: DISCONTINUED | OUTPATIENT
Start: 2020-07-22 | End: 2020-07-23 | Stop reason: HOSPADM

## 2020-07-22 RX ORDER — ROCURONIUM BROMIDE 10 MG/ML
INJECTION, SOLUTION INTRAVENOUS AS NEEDED
Status: DISCONTINUED | OUTPATIENT
Start: 2020-07-22 | End: 2020-07-22 | Stop reason: SURG

## 2020-07-22 RX ORDER — ACETAMINOPHEN 325 MG/1
650 TABLET ORAL ONCE AS NEEDED
Status: DISCONTINUED | OUTPATIENT
Start: 2020-07-22 | End: 2020-07-22 | Stop reason: HOSPADM

## 2020-07-22 RX ORDER — ACETAMINOPHEN 500 MG
1000 TABLET ORAL EVERY 6 HOURS
Status: DISCONTINUED | OUTPATIENT
Start: 2020-07-22 | End: 2020-07-23 | Stop reason: HOSPADM

## 2020-07-22 RX ORDER — FLUMAZENIL 0.1 MG/ML
0.2 INJECTION INTRAVENOUS AS NEEDED
Status: DISCONTINUED | OUTPATIENT
Start: 2020-07-22 | End: 2020-07-22 | Stop reason: HOSPADM

## 2020-07-22 RX ORDER — ONDANSETRON 2 MG/ML
4 INJECTION INTRAMUSCULAR; INTRAVENOUS ONCE AS NEEDED
Status: DISCONTINUED | OUTPATIENT
Start: 2020-07-22 | End: 2020-07-22 | Stop reason: HOSPADM

## 2020-07-22 RX ORDER — SODIUM CHLORIDE, SODIUM LACTATE, POTASSIUM CHLORIDE, CALCIUM CHLORIDE 600; 310; 30; 20 MG/100ML; MG/100ML; MG/100ML; MG/100ML
9 INJECTION, SOLUTION INTRAVENOUS CONTINUOUS
Status: DISCONTINUED | OUTPATIENT
Start: 2020-07-22 | End: 2020-07-22 | Stop reason: HOSPADM

## 2020-07-22 RX ORDER — SODIUM CHLORIDE, SODIUM LACTATE, POTASSIUM CHLORIDE, CALCIUM CHLORIDE 600; 310; 30; 20 MG/100ML; MG/100ML; MG/100ML; MG/100ML
150 INJECTION, SOLUTION INTRAVENOUS CONTINUOUS
Status: DISCONTINUED | OUTPATIENT
Start: 2020-07-22 | End: 2020-07-23 | Stop reason: HOSPADM

## 2020-07-22 RX ORDER — SODIUM CHLORIDE, SODIUM LACTATE, POTASSIUM CHLORIDE, CALCIUM CHLORIDE 600; 310; 30; 20 MG/100ML; MG/100ML; MG/100ML; MG/100ML
100 INJECTION, SOLUTION INTRAVENOUS CONTINUOUS
Status: DISCONTINUED | OUTPATIENT
Start: 2020-07-22 | End: 2020-07-22 | Stop reason: HOSPADM

## 2020-07-22 RX ORDER — ALBUTEROL SULFATE 2.5 MG/3ML
2.5 SOLUTION RESPIRATORY (INHALATION) EVERY 4 HOURS PRN
Status: DISCONTINUED | OUTPATIENT
Start: 2020-07-22 | End: 2020-07-23 | Stop reason: HOSPADM

## 2020-07-22 RX ORDER — LABETALOL HYDROCHLORIDE 5 MG/ML
10 INJECTION, SOLUTION INTRAVENOUS
Status: DISCONTINUED | OUTPATIENT
Start: 2020-07-22 | End: 2020-07-23 | Stop reason: HOSPADM

## 2020-07-22 RX ORDER — PROMETHAZINE HYDROCHLORIDE 25 MG/ML
6.25 INJECTION, SOLUTION INTRAMUSCULAR; INTRAVENOUS
Status: DISCONTINUED | OUTPATIENT
Start: 2020-07-22 | End: 2020-07-22 | Stop reason: HOSPADM

## 2020-07-22 RX ORDER — LORAZEPAM 1 MG/1
1 TABLET ORAL EVERY 12 HOURS PRN
Status: DISCONTINUED | OUTPATIENT
Start: 2020-07-22 | End: 2020-07-23 | Stop reason: HOSPADM

## 2020-07-22 RX ORDER — HALOPERIDOL 5 MG/ML
0.5 INJECTION INTRAMUSCULAR EVERY 4 HOURS PRN
Status: DISCONTINUED | OUTPATIENT
Start: 2020-07-22 | End: 2020-07-23 | Stop reason: HOSPADM

## 2020-07-22 RX ORDER — ONDANSETRON 2 MG/ML
INJECTION INTRAMUSCULAR; INTRAVENOUS AS NEEDED
Status: DISCONTINUED | OUTPATIENT
Start: 2020-07-22 | End: 2020-07-22 | Stop reason: SURG

## 2020-07-22 RX ADMIN — THIAMINE HYDROCHLORIDE 250 ML/HR: 100 INJECTION, SOLUTION INTRAMUSCULAR; INTRAVENOUS at 23:37

## 2020-07-22 RX ADMIN — SODIUM CHLORIDE, POTASSIUM CHLORIDE, SODIUM LACTATE AND CALCIUM CHLORIDE: 600; 310; 30; 20 INJECTION, SOLUTION INTRAVENOUS at 06:40

## 2020-07-22 RX ADMIN — DEXAMETHASONE SODIUM PHOSPHATE 8 MG: 10 INJECTION INTRAMUSCULAR; INTRAVENOUS at 07:08

## 2020-07-22 RX ADMIN — SODIUM CHLORIDE, PRESERVATIVE FREE 3 ML: 5 INJECTION INTRAVENOUS at 20:19

## 2020-07-22 RX ADMIN — ACETAMINOPHEN 1000 MG: 500 TABLET, FILM COATED ORAL at 17:51

## 2020-07-22 RX ADMIN — FENTANYL CITRATE 25 MCG: 50 INJECTION INTRAMUSCULAR; INTRAVENOUS at 07:23

## 2020-07-22 RX ADMIN — PHENYLEPHRINE HYDROCHLORIDE 200 MCG: 10 INJECTION INTRAVENOUS at 07:17

## 2020-07-22 RX ADMIN — ONDANSETRON 4 MG: 2 INJECTION INTRAMUSCULAR; INTRAVENOUS at 10:55

## 2020-07-22 RX ADMIN — SODIUM CHLORIDE, POTASSIUM CHLORIDE, SODIUM LACTATE AND CALCIUM CHLORIDE 100 ML/HR: 600; 310; 30; 20 INJECTION, SOLUTION INTRAVENOUS at 09:06

## 2020-07-22 RX ADMIN — PROPOFOL 250 MG: 10 INJECTION, EMULSION INTRAVENOUS at 07:00

## 2020-07-22 RX ADMIN — SODIUM CHLORIDE, POTASSIUM CHLORIDE, SODIUM LACTATE AND CALCIUM CHLORIDE 500 ML: 600; 310; 30; 20 INJECTION, SOLUTION INTRAVENOUS at 06:32

## 2020-07-22 RX ADMIN — METOCLOPRAMIDE HYDROCHLORIDE 10 MG: 5 INJECTION INTRAMUSCULAR; INTRAVENOUS at 06:31

## 2020-07-22 RX ADMIN — ROCURONIUM BROMIDE 50 MG: 10 INJECTION, SOLUTION INTRAVENOUS at 07:00

## 2020-07-22 RX ADMIN — SUGAMMADEX 200 MG: 100 INJECTION, SOLUTION INTRAVENOUS at 07:46

## 2020-07-22 RX ADMIN — GABAPENTIN 300 MG: 300 CAPSULE ORAL at 14:26

## 2020-07-22 RX ADMIN — LIDOCAINE HYDROCHLORIDE 100 MG: 20 INJECTION, SOLUTION INFILTRATION; PERINEURAL at 07:00

## 2020-07-22 RX ADMIN — FAMOTIDINE 20 MG: 10 INJECTION INTRAVENOUS at 14:25

## 2020-07-22 RX ADMIN — HYDROMORPHONE HYDROCHLORIDE 0.5 MG: 1 INJECTION, SOLUTION INTRAMUSCULAR; INTRAVENOUS; SUBCUTANEOUS at 14:52

## 2020-07-22 RX ADMIN — SCOPALAMINE 1 PATCH: 1 PATCH, EXTENDED RELEASE TRANSDERMAL at 06:06

## 2020-07-22 RX ADMIN — METOCLOPRAMIDE HYDROCHLORIDE 10 MG: 5 INJECTION INTRAMUSCULAR; INTRAVENOUS at 10:55

## 2020-07-22 RX ADMIN — FENTANYL CITRATE 50 MCG: 50 INJECTION, SOLUTION INTRAMUSCULAR; INTRAVENOUS at 09:23

## 2020-07-22 RX ADMIN — MIDAZOLAM 1 MG: 1 INJECTION INTRAMUSCULAR; INTRAVENOUS at 06:44

## 2020-07-22 RX ADMIN — CHLORHEXIDINE GLUCONATE 15 ML: 1.2 RINSE ORAL at 06:06

## 2020-07-22 RX ADMIN — FENTANYL CITRATE 100 MCG: 50 INJECTION INTRAMUSCULAR; INTRAVENOUS at 07:00

## 2020-07-22 RX ADMIN — ACETAMINOPHEN 1000 MG: 500 TABLET, FILM COATED ORAL at 10:55

## 2020-07-22 RX ADMIN — SODIUM CHLORIDE, POTASSIUM CHLORIDE, SODIUM LACTATE AND CALCIUM CHLORIDE 150 ML/HR: 600; 310; 30; 20 INJECTION, SOLUTION INTRAVENOUS at 10:01

## 2020-07-22 RX ADMIN — FAMOTIDINE 20 MG: 10 INJECTION INTRAVENOUS at 06:45

## 2020-07-22 RX ADMIN — METOCLOPRAMIDE HYDROCHLORIDE 10 MG: 5 INJECTION INTRAMUSCULAR; INTRAVENOUS at 17:51

## 2020-07-22 RX ADMIN — FAMOTIDINE 20 MG: 10 INJECTION INTRAVENOUS at 20:19

## 2020-07-22 RX ADMIN — HYDROMORPHONE HYDROCHLORIDE 0.5 MG: 1 INJECTION, SOLUTION INTRAMUSCULAR; INTRAVENOUS; SUBCUTANEOUS at 08:40

## 2020-07-22 RX ADMIN — FENTANYL CITRATE 25 MCG: 50 INJECTION INTRAMUSCULAR; INTRAVENOUS at 07:28

## 2020-07-22 RX ADMIN — CEFAZOLIN 3 G: 1 INJECTION, POWDER, FOR SOLUTION INTRAMUSCULAR; INTRAVENOUS; PARENTERAL at 06:49

## 2020-07-22 RX ADMIN — GABAPENTIN 300 MG: 300 CAPSULE ORAL at 23:38

## 2020-07-22 RX ADMIN — METOCLOPRAMIDE HYDROCHLORIDE 10 MG: 5 INJECTION INTRAMUSCULAR; INTRAVENOUS at 23:38

## 2020-07-22 RX ADMIN — DEXMEDETOMIDINE HYDROCHLORIDE 6 MCG/HR: 4 INJECTION INTRAVENOUS at 07:21

## 2020-07-22 RX ADMIN — SODIUM CHLORIDE, PRESERVATIVE FREE 3 ML: 5 INJECTION INTRAVENOUS at 10:01

## 2020-07-22 RX ADMIN — PANTOPRAZOLE SODIUM 40 MG: 40 INJECTION, POWDER, FOR SOLUTION INTRAVENOUS at 06:31

## 2020-07-22 RX ADMIN — ENOXAPARIN SODIUM 40 MG: 40 INJECTION SUBCUTANEOUS at 06:41

## 2020-07-22 RX ADMIN — SODIUM CHLORIDE, POTASSIUM CHLORIDE, SODIUM LACTATE AND CALCIUM CHLORIDE 150 ML/HR: 600; 310; 30; 20 INJECTION, SOLUTION INTRAVENOUS at 20:29

## 2020-07-22 RX ADMIN — ONDANSETRON HYDROCHLORIDE 4 MG: 2 SOLUTION INTRAMUSCULAR; INTRAVENOUS at 07:45

## 2020-07-22 RX ADMIN — MIRTAZAPINE 15 MG: 15 TABLET, ORALLY DISINTEGRATING ORAL at 20:19

## 2020-07-22 RX ADMIN — SODIUM CHLORIDE, POTASSIUM CHLORIDE, SODIUM LACTATE AND CALCIUM CHLORIDE 150 ML/HR: 600; 310; 30; 20 INJECTION, SOLUTION INTRAVENOUS at 14:26

## 2020-07-22 RX ADMIN — FENTANYL CITRATE 25 MCG: 50 INJECTION INTRAMUSCULAR; INTRAVENOUS at 07:32

## 2020-07-22 RX ADMIN — ACETAMINOPHEN ORAL SOLUTION 975 MG: 325 SOLUTION ORAL at 06:06

## 2020-07-22 RX ADMIN — PROMETHAZINE HYDROCHLORIDE 6.25 MG: 25 INJECTION INTRAMUSCULAR; INTRAVENOUS at 08:48

## 2020-07-22 RX ADMIN — ACETAMINOPHEN 1000 MG: 500 TABLET, FILM COATED ORAL at 23:38

## 2020-07-22 RX ADMIN — HYOSCYAMINE SULFATE 125 MCG: 0.12 TABLET, ORALLY DISINTEGRATING ORAL at 14:25

## 2020-07-22 RX ADMIN — FENTANYL CITRATE 25 MCG: 50 INJECTION INTRAMUSCULAR; INTRAVENOUS at 07:25

## 2020-07-22 NOTE — ANESTHESIA PROCEDURE NOTES
Airway  Urgency: elective    Date/Time: 7/22/2020 7:01 AM  Airway not difficult    General Information and Staff    Patient location during procedure: OR  Anesthesiologist: Andrey Beltre MD  CRNA: Rachelle Reilly CRNA    Indications and Patient Condition  Indications for airway management: airway protection    Preoxygenated: yes  MILS not maintained throughout  Mask difficulty assessment: 1 - vent by mask    Final Airway Details  Final airway type: endotracheal airway      Successful airway: ETT  Cuffed: yes   Successful intubation technique: direct laryngoscopy  Facilitating devices/methods: intubating stylet  Endotracheal tube insertion site: oral  Blade: Jessie  Blade size: 3  ETT size (mm): 7.0  Cormack-Lehane Classification: grade I - full view of glottis  Placement verified by: chest auscultation   Cuff volume (mL): 8  Measured from: lips  Number of attempts at approach: 1  Assessment: lips, teeth, and gum same as pre-op and atraumatic intubation    Additional Comments  PreO2 100% face mask, IV induction, easy mask, DVL x1, cords noted, tube through, cuff up, EBBSH, +etCO2, = chest movement, tube secured in place, atraumatic, teeth and lips intact as preop.

## 2020-07-22 NOTE — ANESTHESIA POSTPROCEDURE EVALUATION
Patient: Armin Viramontes    Procedure Summary     Date:  07/22/20 Room / Location:   [facility] OR  /  [facility]    Anesthesia Start:  0649 Anesthesia Stop:  0758    Procedure:  GASTRIC SLEEVE LAPAROSCOPIC (N/A Abdomen) Diagnosis:       Class 3 severe obesity due to excess calories with serious comorbidity and body mass index (BMI) of 50.0 to 59.9 in adult (CMS/[facility])      (Class 3 severe obesity due to excess calories with serious comorbidity and body mass index (BMI) of 50.0 to 59.9 in adult (CMS/[facility]) [E66.01, Z68.43])    Surgeon:  Andrey Stafford Jr., MD Provider:  Andrey Beltre MD    Anesthesia Type:  general ASA Status:  3          Anesthesia Type: general    Vitals  Vitals Value Taken Time   /78 7/22/2020  9:30 AM   Temp 36.8 °C (98.2 °F) 7/22/2020  9:30 AM   Pulse 74 7/22/2020  9:44 AM   Resp 20 7/22/2020  9:45 AM   SpO2 100 % 7/22/2020  9:44 AM   Vitals shown include unvalidated device data.        Post Anesthesia Care and Evaluation    Patient location during evaluation: bedside  Patient participation: complete - patient participated  Level of consciousness: awake and alert  Pain management: adequate  Airway patency: patent  Anesthetic complications: No anesthetic complications  PONV Status: controlled  Cardiovascular status: acceptable  Respiratory status: acceptable  Hydration status: acceptable

## 2020-07-22 NOTE — ANESTHESIA PREPROCEDURE EVALUATION
Anesthesia Evaluation     Patient summary reviewed and Nursing notes reviewed   no history of anesthetic complications:  NPO Solid Status: > 8 hours  NPO Liquid Status: > 8 hours           Airway   Mallampati: II  TM distance: >3 FB  Neck ROM: full  Possible difficult intubation  Dental - normal exam     Pulmonary    (+) sleep apnea on CPAP,   (-) rhonchi, decreased breath sounds, wheezes, not a smoker  Cardiovascular   Exercise tolerance: good (4-7 METS)    Rhythm: regular  Rate: normal    (+) hyperlipidemia,   (-) hypertension, CAD, angina, MORTENSEN, murmur      Neuro/Psych  (-) CVA  GI/Hepatic/Renal/Endo    (+) morbid obesity, GERD,    (-) no renal disease, diabetes    Musculoskeletal     Abdominal     Abdomen: soft.   Substance History      OB/GYN          Other   arthritis,                      Anesthesia Plan    ASA 3     general   (CMAC available )  intravenous induction     Anesthetic plan, all risks, benefits, and alternatives have been provided, discussed and informed consent has been obtained with: patient.

## 2020-07-23 VITALS
WEIGHT: 244 LBS | DIASTOLIC BLOOD PRESSURE: 97 MMHG | HEART RATE: 71 BPM | SYSTOLIC BLOOD PRESSURE: 162 MMHG | BODY MASS INDEX: 46.07 KG/M2 | RESPIRATION RATE: 18 BRPM | HEIGHT: 61 IN | TEMPERATURE: 97.8 F | OXYGEN SATURATION: 96 %

## 2020-07-23 LAB
ALBUMIN SERPL-MCNC: 3.9 G/DL (ref 3.5–5.2)
ALBUMIN/GLOB SERPL: 1.3 G/DL
ALP SERPL-CCNC: 69 U/L (ref 39–117)
ALT SERPL W P-5'-P-CCNC: 45 U/L (ref 1–33)
ANION GAP SERPL CALCULATED.3IONS-SCNC: 11.1 MMOL/L (ref 5–15)
AST SERPL-CCNC: 31 U/L (ref 1–32)
BASOPHILS # BLD AUTO: 0.02 10*3/MM3 (ref 0–0.2)
BASOPHILS NFR BLD AUTO: 0.1 % (ref 0–1.5)
BILIRUB SERPL-MCNC: 0.2 MG/DL (ref 0–1.2)
BUN SERPL-MCNC: 6 MG/DL (ref 6–20)
BUN/CREAT SERPL: 8.6 (ref 7–25)
CALCIUM SPEC-SCNC: 9 MG/DL (ref 8.6–10.5)
CHLORIDE SERPL-SCNC: 102 MMOL/L (ref 98–107)
CO2 SERPL-SCNC: 23.9 MMOL/L (ref 22–29)
CREAT SERPL-MCNC: 0.7 MG/DL (ref 0.57–1)
CYTO UR: NORMAL
DEPRECATED RDW RBC AUTO: 44 FL (ref 37–54)
EOSINOPHIL # BLD AUTO: 0 10*3/MM3 (ref 0–0.4)
EOSINOPHIL NFR BLD AUTO: 0 % (ref 0.3–6.2)
ERYTHROCYTE [DISTWIDTH] IN BLOOD BY AUTOMATED COUNT: 14.4 % (ref 12.3–15.4)
GFR SERPL CREATININE-BSD FRML MDRD: 89 ML/MIN/1.73
GLOBULIN UR ELPH-MCNC: 3 GM/DL
GLUCOSE SERPL-MCNC: 106 MG/DL (ref 65–99)
HCT VFR BLD AUTO: 35.8 % (ref 34–46.6)
HGB BLD-MCNC: 11.2 G/DL (ref 12–15.9)
IMM GRANULOCYTES # BLD AUTO: 0.05 10*3/MM3 (ref 0–0.05)
IMM GRANULOCYTES NFR BLD AUTO: 0.3 % (ref 0–0.5)
LAB AP CASE REPORT: NORMAL
LYMPHOCYTES # BLD AUTO: 2.49 10*3/MM3 (ref 0.7–3.1)
LYMPHOCYTES NFR BLD AUTO: 16.9 % (ref 19.6–45.3)
MAGNESIUM SERPL-MCNC: 2.2 MG/DL (ref 1.6–2.6)
MCH RBC QN AUTO: 26.2 PG (ref 26.6–33)
MCHC RBC AUTO-ENTMCNC: 31.3 G/DL (ref 31.5–35.7)
MCV RBC AUTO: 83.6 FL (ref 79–97)
MONOCYTES # BLD AUTO: 0.89 10*3/MM3 (ref 0.1–0.9)
MONOCYTES NFR BLD AUTO: 6.1 % (ref 5–12)
NEUTROPHILS NFR BLD AUTO: 11.25 10*3/MM3 (ref 1.7–7)
NEUTROPHILS NFR BLD AUTO: 76.6 % (ref 42.7–76)
NRBC BLD AUTO-RTO: 0 /100 WBC (ref 0–0.2)
PATH REPORT.FINAL DX SPEC: NORMAL
PATH REPORT.GROSS SPEC: NORMAL
PHOSPHATE SERPL-MCNC: 3.1 MG/DL (ref 2.5–4.5)
PLATELET # BLD AUTO: 349 10*3/MM3 (ref 140–450)
PMV BLD AUTO: 10 FL (ref 6–12)
POTASSIUM SERPL-SCNC: 3.9 MMOL/L (ref 3.5–5.2)
PROT SERPL-MCNC: 6.9 G/DL (ref 6–8.5)
RBC # BLD AUTO: 4.28 10*6/MM3 (ref 3.77–5.28)
SODIUM SERPL-SCNC: 137 MMOL/L (ref 136–145)
WBC # BLD AUTO: 14.7 10*3/MM3 (ref 3.4–10.8)

## 2020-07-23 PROCEDURE — 83735 ASSAY OF MAGNESIUM: CPT | Performed by: SURGERY

## 2020-07-23 PROCEDURE — 84100 ASSAY OF PHOSPHORUS: CPT | Performed by: SURGERY

## 2020-07-23 PROCEDURE — 80053 COMPREHEN METABOLIC PANEL: CPT | Performed by: SURGERY

## 2020-07-23 PROCEDURE — 25010000002 METOCLOPRAMIDE PER 10 MG: Performed by: SURGERY

## 2020-07-23 PROCEDURE — 25010000002 THIAMINE PER 100 MG: Performed by: SURGERY

## 2020-07-23 PROCEDURE — 25010000002 CYANOCOBALAMIN PER 1000 MCG: Performed by: SURGERY

## 2020-07-23 PROCEDURE — 85025 COMPLETE CBC W/AUTO DIFF WBC: CPT | Performed by: SURGERY

## 2020-07-23 PROCEDURE — 25010000002 ENOXAPARIN PER 10 MG: Performed by: SURGERY

## 2020-07-23 RX ORDER — LANOLIN ALCOHOL/MO/W.PET/CERES
325 CREAM (GRAM) TOPICAL
Qty: 30 TABLET | Refills: 2 | Status: ON HOLD | OUTPATIENT
Start: 2020-07-23 | End: 2020-08-27

## 2020-07-23 RX ORDER — ONDANSETRON 4 MG/1
4 TABLET, ORALLY DISINTEGRATING ORAL EVERY 4 HOURS PRN
Qty: 12 TABLET | Refills: 0 | Status: SHIPPED | OUTPATIENT
Start: 2020-07-23 | End: 2020-08-04 | Stop reason: SDUPTHER

## 2020-07-23 RX ORDER — HYDROMORPHONE HYDROCHLORIDE 2 MG/1
2 TABLET ORAL EVERY 4 HOURS PRN
Qty: 18 TABLET | Refills: 0 | Status: SHIPPED | OUTPATIENT
Start: 2020-07-23 | End: 2020-08-01

## 2020-07-23 RX ADMIN — FAMOTIDINE 20 MG: 10 INJECTION INTRAVENOUS at 09:38

## 2020-07-23 RX ADMIN — SODIUM CHLORIDE, POTASSIUM CHLORIDE, SODIUM LACTATE AND CALCIUM CHLORIDE 150 ML/HR: 600; 310; 30; 20 INJECTION, SOLUTION INTRAVENOUS at 11:44

## 2020-07-23 RX ADMIN — METOCLOPRAMIDE HYDROCHLORIDE 10 MG: 5 INJECTION INTRAMUSCULAR; INTRAVENOUS at 10:01

## 2020-07-23 RX ADMIN — ACETAMINOPHEN 1000 MG: 500 TABLET, FILM COATED ORAL at 05:28

## 2020-07-23 RX ADMIN — ACETAMINOPHEN 1000 MG: 500 TABLET, FILM COATED ORAL at 11:45

## 2020-07-23 RX ADMIN — METOCLOPRAMIDE HYDROCHLORIDE 10 MG: 5 INJECTION INTRAMUSCULAR; INTRAVENOUS at 05:27

## 2020-07-23 RX ADMIN — SODIUM CHLORIDE, POTASSIUM CHLORIDE, SODIUM LACTATE AND CALCIUM CHLORIDE 150 ML/HR: 600; 310; 30; 20 INJECTION, SOLUTION INTRAVENOUS at 04:10

## 2020-07-23 RX ADMIN — GABAPENTIN 300 MG: 300 CAPSULE ORAL at 13:50

## 2020-07-23 RX ADMIN — SODIUM CHLORIDE, PRESERVATIVE FREE 3 ML: 5 INJECTION INTRAVENOUS at 09:39

## 2020-07-23 RX ADMIN — GABAPENTIN 300 MG: 300 CAPSULE ORAL at 05:27

## 2020-07-23 RX ADMIN — CYANOCOBALAMIN 1000 MCG: 1000 INJECTION, SOLUTION INTRAMUSCULAR at 09:38

## 2020-07-23 RX ADMIN — HYOSCYAMINE SULFATE 125 MCG: 0.12 TABLET, ORALLY DISINTEGRATING ORAL at 00:04

## 2020-07-23 RX ADMIN — ENOXAPARIN SODIUM 40 MG: 40 INJECTION SUBCUTANEOUS at 09:38

## 2020-07-28 ENCOUNTER — OFFICE VISIT (OUTPATIENT)
Dept: BARIATRICS/WEIGHT MGMT | Facility: CLINIC | Age: 50
End: 2020-07-28

## 2020-07-28 VITALS
RESPIRATION RATE: 18 BRPM | HEART RATE: 105 BPM | SYSTOLIC BLOOD PRESSURE: 128 MMHG | HEIGHT: 61 IN | BODY MASS INDEX: 45.5 KG/M2 | DIASTOLIC BLOOD PRESSURE: 89 MMHG | TEMPERATURE: 97.3 F | WEIGHT: 241 LBS

## 2020-07-28 DIAGNOSIS — K21.9 GASTROESOPHAGEAL REFLUX DISEASE WITHOUT ESOPHAGITIS: ICD-10-CM

## 2020-07-28 DIAGNOSIS — Z71.3 DIETARY COUNSELING: ICD-10-CM

## 2020-07-28 DIAGNOSIS — Z90.3 HISTORY OF SLEEVE GASTRECTOMY: ICD-10-CM

## 2020-07-28 DIAGNOSIS — R53.82 CHRONIC FATIGUE: ICD-10-CM

## 2020-07-28 DIAGNOSIS — E66.01 OBESITY, CLASS III, BMI 40-49.9 (MORBID OBESITY) (HCC): Primary | ICD-10-CM

## 2020-07-28 DIAGNOSIS — G47.33 OSA (OBSTRUCTIVE SLEEP APNEA): ICD-10-CM

## 2020-07-28 PROBLEM — E66.813 OBESITY, CLASS III, BMI 40-49.9 (MORBID OBESITY): Status: ACTIVE | Noted: 2020-07-28

## 2020-07-28 PROBLEM — E66.813 CLASS 3 SEVERE OBESITY DUE TO EXCESS CALORIES WITH SERIOUS COMORBIDITY AND BODY MASS INDEX (BMI) OF 50.0 TO 59.9 IN ADULT: Status: RESOLVED | Noted: 2020-07-09 | Resolved: 2020-07-28

## 2020-07-28 PROCEDURE — 99024 POSTOP FOLLOW-UP VISIT: CPT | Performed by: SURGERY

## 2020-07-28 NOTE — PROGRESS NOTES
[facility]  [address] [address]  [phone]  [address] [address]  [phone]  Dept: [phone]  7/28/2020      Armin Viramontes.  [id number]  [id number]  1970  female      Chief Complaint   Patient presents with   • Follow-up     1 week post op sleeve       BH Post-Op Bariatric Surgery:   Armin Viramontes is status post laparopscopic Laparoscopic Sleeve procedure, performed on 7/22/20.     HPI:   Today's weight is 109 kg (241 lb) pounds, today's BMI is Body mass index is 45.56 kg/m².,@ has a  loss of 24 pounds since the last visit and@ weight loss since surgery is 24 pounds. The patient reports a decreased portion size and loss of appetite.  Armin Viramontes denies nausea vomiting fever chills chest pain shortness of air or lower extremity pain and reports feeling much better the past 2 days and tolerating her diet. The patient c/o appropriate post-op incisional discomfort that is improving. she is doing well with protein and water intake so far. Taking their vitamins, walking and using IS. Denies fevers, chills, chest pain or shortness of air.      Diet and Exercise: Diet history reviewed and discussed with the patient. Weight loss/gains to date discussed with the patient. No carbonated beverage consumption and exercising regularly- walking frequently.   Supplements: multivitamins, B-12, calcium, iron, B-1 and Vitamin D.   Patient is taking blood thinner as prescribed: Not indicated    Review of Systems   Constitutional: Positive for fatigue.   Musculoskeletal: Positive for arthralgias.   All other systems reviewed and are negative.      Patient Active Problem List   Diagnosis   • Seasonal allergies   • Hyperlipidemia   • GERD (gastroesophageal reflux disease)   • HPV in female   • Family history of ovarian cancer   • Lymphadenopathy   • Iron deficiency anemia due to chronic blood loss   •  Insomnia   • Chronic fatigue   • JAZMYN (obstructive sleep apnea)   • Multiple joint pain   • Prediabetes   • SOB (shortness of breath)   • Leg swelling   • Chest pain, atypical   • Gastric polyps   • Obesity, Class III, BMI 40-49.9 (morbid obesity) (CMS/HCC)   • Dietary counseling   • History of sleeve gastrectomy       The following portions of the patient's history were reviewed and updated as appropriate: allergies, current medications, past family history, past medical history, past social history, past surgical history and problem list.    Vitals:    07/28/20 1319   BP: 128/89   Pulse: 105   Resp: 18   Temp: 97.3 °F (36.3 °C)       Physical Exam   Constitutional: She is oriented to person, place, and time. She appears well-nourished.   HENT:   Head: Normocephalic and atraumatic.   Mouth/Throat: Oropharynx is clear and moist.   Eyes: Pupils are equal, round, and reactive to light. Conjunctivae and EOM are normal. No scleral icterus.   Neck: Normal range of motion. Neck supple. No thyromegaly present.   Cardiovascular: Normal rate and regular rhythm.   Pulmonary/Chest: Effort normal and breath sounds normal.   Abdominal: Soft. Bowel sounds are normal. She exhibits no distension and no mass. There is no rebound and no guarding. No hernia.   Incisions clean, dry and intact without erythema and appropriate tenderness   Musculoskeletal: Normal range of motion.   Lymphadenopathy:     She has no cervical adenopathy.   Neurological: She is alert and oriented to person, place, and time. No cranial nerve deficit. Coordination normal.   Skin: Skin is warm and dry. No erythema.   Psychiatric: She has a normal mood and affect. Her behavior is normal.   Vitals reviewed.      Assessment:   Post-op, the patient is 1 week status post laparoscopic sleeve gastrectomy.  Patient is afebrile and hemodynamically stable.  Recheck pulse was 89.  Incisions are healing well.  Patient tolerating stage I diet and will advance to stage II.   Patient will start on vitamins today..     Encounter Diagnoses   Name Primary?   • Obesity, Class III, BMI 40-49.9 (morbid obesity) (CMS/[facility]) Yes   • Dietary counseling    • Chronic fatigue    • JAZMYN (obstructive sleep apnea)    • History of sleeve gastrectomy    • Gastroesophageal reflux disease without esophagitis        Plan:   Reviewed with patient the importance of following the manual for diet progression. Increase activity as tolerated. Continue increasing daily intake of protein and water.   Return to work: the patient is to return to 3 weeks from their surgery date with no restrictions unless they develop medical problems in which we will see them back in the office. They received a note in our office today with their return to work date.  Activity restrictions: no lifting, pushing or pulling over 25lbs for 3 weeks.   Recommended patient be sure to get at least 70 grams of protein per day. Discussed with the patient the recommended amount of water per day to intake. Reviewed vitamin requirements. Be sure to do routine exercise and increase activity as tolerated. No asa, nsaids or steroids for 8 weeks. Patient may use miralax as needed if necessary.     Instructions / Recommendations: dietary counseling recommended, recommended a daily protein intake of  grams, vitamin supplement(s) recommended, recommended exercising at least 150 minutes per week, behavior modifications recommended and instructed to call the office for concerns, questions, or problems.     The patient was instructed to follow up at one month follow up appt.     The patient was counseled regarding post op bariatric manual

## 2020-08-03 ENCOUNTER — TELEPHONE (OUTPATIENT)
Dept: BARIATRICS/WEIGHT MGMT | Facility: CLINIC | Age: 50
End: 2020-08-03

## 2020-08-03 NOTE — TELEPHONE ENCOUNTER
Patient emailed our bariatric coordinator Vee over the weekend with some concerns of hunger pains and some vomiting. She was worried she was not getting the full amount of protein and fluids we like for them to get daily. Vee emailed back to the patient to take things slow and its just a goal, this soon after surgery we do not expect them to be getting the full 70g of protein and 64 oz of fluids.     I tried to call the patient today to follow up with her, I left a voicemail asking her to call our office back to let us know how she was doing.

## 2020-08-04 RX ORDER — ONDANSETRON 4 MG/1
4 TABLET, ORALLY DISINTEGRATING ORAL EVERY 4 HOURS PRN
Qty: 12 TABLET | Refills: 0 | Status: SHIPPED | OUTPATIENT
Start: 2020-08-04 | End: 2020-08-19

## 2020-08-04 RX ORDER — ONDANSETRON 4 MG/1
4 TABLET, ORALLY DISINTEGRATING ORAL EVERY 4 HOURS PRN
Qty: 12 TABLET | Refills: 0 | OUTPATIENT
Start: 2020-08-04

## 2020-08-04 NOTE — TELEPHONE ENCOUNTER
Called patient in regards to her nausea. After talking with her more, patient explained that she feels the nausea is due to her struggling with food textures in week 2 diet and the sweetness of protein drinks. She denied any vomiting, dysphagia or fever. She said she feels like she is constantly full after only a few bites and her stomach makes a lot of noises after she eats. I advised that shet try and watch how quickly she is eating as well as taking small sips of fluid and small bites of food when solids are added back to her diet. We discussed her trying to add Genpro and cutting protein drinks with water to help with the sweetness. She can start week 3 tomorrow and the patient feels confident in adding scrambled eggs to her diet. Patient would like a refill on Zofran from surgery just to be on the cautious side and I instructed patient to follow up with us at the end of the week. Patient agreed with the plan.

## 2020-08-05 ENCOUNTER — OFFICE VISIT (OUTPATIENT)
Dept: BARIATRICS/WEIGHT MGMT | Facility: CLINIC | Age: 50
End: 2020-08-05

## 2020-08-05 VITALS
RESPIRATION RATE: 18 BRPM | BODY MASS INDEX: 43.99 KG/M2 | WEIGHT: 233 LBS | SYSTOLIC BLOOD PRESSURE: 142 MMHG | TEMPERATURE: 97.8 F | HEIGHT: 61 IN | DIASTOLIC BLOOD PRESSURE: 88 MMHG | HEART RATE: 99 BPM

## 2020-08-05 DIAGNOSIS — E66.01 OBESITY, CLASS III, BMI 40-49.9 (MORBID OBESITY) (HCC): Primary | ICD-10-CM

## 2020-08-05 DIAGNOSIS — Z98.84 S/P LAPAROSCOPIC SLEEVE GASTRECTOMY: ICD-10-CM

## 2020-08-05 DIAGNOSIS — Z71.3 DIETARY COUNSELING: ICD-10-CM

## 2020-08-05 DIAGNOSIS — R11.0 NAUSEA: ICD-10-CM

## 2020-08-05 PROCEDURE — 99024 POSTOP FOLLOW-UP VISIT: CPT | Performed by: NURSE PRACTITIONER

## 2020-08-05 NOTE — PROGRESS NOTES
[facility]  [address] [facility] [address]  [facility] [address]  [phone]  [address] [id number] [phone]  Dept: [phone]  8/5/2020      Armin Viramontes.  [id number]  [id number]  1970  female      Chief Complaint   Patient presents with   • Post-op     2 week post op sleeve       BH Post-Op Bariatric Surgery:   Armin Viramontes is status post laparopscopic Laparoscopic Sleeve procedure, performed on 7/22/20.     HPI:   Today's weight is 106 kg (233 lb) pounds, today's BMI is Body mass index is 44.05 kg/m².,@ has a  loss of 8 pounds since the last visit and@ weight loss since surgery is 32 pounds. The patient reports a decreased portion size and loss of appetite.  Armin Viramontes denies v/d/reflux/pain (had one episode of vomiting) and reports nausea that started last Friday but has been intermittent but noticed yesterday her nausea was worse but her nausea resolved when taking the zofran. she is doing well with water intake so far. She tried an egg today and did fine. Mostly having trouble with protein drinks with nausea but tolerating flavored water without any problems. Struggles with anxiety. Taking their vitamins- whole foods fusion. Walking daily several times.     Diet and Exercise: Diet history reviewed and discussed with the patient. Weight loss/gains to date discussed with the patient. No carbonated beverage consumption and exercising regularly- walking frequently.   Supplements: multivitamins, B-12, calcium, iron, B-1 and Vitamin D. Says she is doing her lovenox.    Review of Systems   Gastrointestinal: Positive for nausea.   All other systems reviewed and are negative.      Patient Active Problem List   Diagnosis   • Seasonal allergies   • Hyperlipidemia   • GERD (gastroesophageal reflux disease)   • HPV in female   • Family history of ovarian cancer   • Lymphadenopathy   • Iron deficiency anemia due to  chronic blood loss   • Insomnia   • Chronic fatigue   • JAZMYN (obstructive sleep apnea)   • Multiple joint pain   • Prediabetes   • SOB (shortness of breath)   • Leg swelling   • Chest pain, atypical   • Gastric polyps   • Obesity, Class III, BMI 40-49.9 (morbid obesity) (CMS/HCC)   • Dietary counseling   • S/P laparoscopic sleeve gastrectomy   • Nausea       The following portions of the patient's history were reviewed and updated as appropriate: allergies, current medications, past family history, past medical history, past social history, past surgical history and problem list.    Vitals:    08/05/20 1425   BP: 142/88   Pulse: 99   Resp: 18   Temp: 97.8 °F (36.6 °C)       Physical Exam   Constitutional: She is oriented to person, place, and time. She appears well-developed and well-nourished.   HENT:   Head: Normocephalic and atraumatic.   Eyes: EOM are normal.   Cardiovascular: Normal rate.   Pulmonary/Chest: Effort normal. No respiratory distress.   Abdominal: Soft. She exhibits no distension. There is no tenderness.   Musculoskeletal: Normal range of motion.   Neurological: She is alert and oriented to person, place, and time.   Skin: Skin is warm and dry.   Incisions look great   Psychiatric: She has a normal mood and affect. Her behavior is normal. Judgment and thought content normal.   Vitals reviewed.      Assessment:   Post-op, the patient is recovering appropriately but having nausea with protein drinks/artifical sweeteners so we discussed her options and to use the zofran as needed.     Encounter Diagnoses   Name Primary?   • Obesity, Class III, BMI 40-49.9 (morbid obesity) (CMS/HCC) Yes   • S/P laparoscopic sleeve gastrectomy    • Dietary counseling    • Nausea        Plan:   Reviewed with patient the importance of following the manual for diet progression. Increase activity as tolerated. Continue increasing daily intake of protein and water.   Return to work: the patient is to return to 3 weeks from  their surgery date with no restrictions unless they develop medical problems in which we will see them back in the office. They received a note in our office today with their return to work date.  Activity restrictions: no lifting, pushing or pulling over 25lbs for 3 weeks.   Recommended patient be sure to get at least 70 grams of protein per day. Discussed with the patient the recommended amount of water per day to intake. Reviewed vitamin requirements. Be sure to do routine exercise and increase activity as tolerated. No asa, nsaids or steroids for 8 weeks. Patient may use miralax as needed if necessary.     Instructions / Recommendations: dietary counseling recommended, recommended a daily protein intake of  grams, vitamin supplement(s) recommended, recommended exercising at least 150 minutes per week, behavior modifications recommended and instructed to call the office for concerns, questions, or problems.     The patient was instructed to follow up at one month follow up appt.     The patient was counseled regarding post op bariatric manual

## 2020-08-12 ENCOUNTER — TELEPHONE (OUTPATIENT)
Dept: BARIATRICS/WEIGHT MGMT | Facility: CLINIC | Age: 50
End: 2020-08-12

## 2020-08-12 RX ORDER — METOCLOPRAMIDE 10 MG/1
10 TABLET ORAL
Qty: 120 TABLET | Refills: 0 | Status: SHIPPED | OUTPATIENT
Start: 2020-08-12 | End: 2020-08-18

## 2020-08-12 NOTE — TELEPHONE ENCOUNTER
"Patient called back this afternoon with concerns after doing research on Reglan and is not willing to take the medication due to the side effects. She stated \"she already has a lot of anxiety and doesn't want to take something that will make it worse.\" Patient is asking if there is something she can take OTC as well as she is having some tightness in her right calf. She denied any warmth or tenderness to the touch. I spoke with MANI Armando and she advised the patient to take pepto OTC as well as when feeling fine after taking Zofran, try to take in as much fluids and protein as she can. MANI Armando wanted patient to drink electrolytes for the tightness in her calf but to watch for any additional symptoms and call if those do happen. The patient is wanting to be seen as soon as she can, she is scheduled with Dr. Stafford on 8/18 but she was told that we would call with any cancellations.     The patient has discussed on multiple occasions that she has issues with anxiety, therefore per Tr's instructions, she was advised to follow up with PCP to see if this is something they should address. Patient agreed with the plan.        Patient called this morning stating her symptoms from  last week have not improved. She was seen in office 8/5 and was given Zofran but she is c/o continuous nausea, dizziness and always feeling full even after a sip of water or bite of egg. She is getting about 20oz of fluids daily and using genpro for protein. After discussing with MANI Armando, she is going to send in Reglan and we will schedule an appointment for her to come in and see Dr. Stafford next week. Relayed all information to the patient and she agreed with the plan moving forward. instructed patient to call the office with any other questions or concerns. Patient agreed.  "

## 2020-08-18 ENCOUNTER — OFFICE VISIT (OUTPATIENT)
Dept: BARIATRICS/WEIGHT MGMT | Facility: CLINIC | Age: 50
End: 2020-08-18

## 2020-08-18 VITALS
HEIGHT: 61 IN | SYSTOLIC BLOOD PRESSURE: 150 MMHG | HEART RATE: 83 BPM | RESPIRATION RATE: 18 BRPM | TEMPERATURE: 98 F | WEIGHT: 225 LBS | BODY MASS INDEX: 42.48 KG/M2 | DIASTOLIC BLOOD PRESSURE: 93 MMHG

## 2020-08-18 DIAGNOSIS — E66.01 OBESITY, CLASS III, BMI 40-49.9 (MORBID OBESITY) (HCC): Primary | ICD-10-CM

## 2020-08-18 DIAGNOSIS — Z98.84 S/P LAPAROSCOPIC SLEEVE GASTRECTOMY: ICD-10-CM

## 2020-08-18 DIAGNOSIS — Z71.3 DIETARY COUNSELING: ICD-10-CM

## 2020-08-18 DIAGNOSIS — R53.82 CHRONIC FATIGUE: ICD-10-CM

## 2020-08-18 DIAGNOSIS — R11.0 NAUSEA: ICD-10-CM

## 2020-08-18 PROCEDURE — 99024 POSTOP FOLLOW-UP VISIT: CPT | Performed by: SURGERY

## 2020-08-18 RX ORDER — SODIUM CHLORIDE, SODIUM LACTATE, POTASSIUM CHLORIDE, CALCIUM CHLORIDE 600; 310; 30; 20 MG/100ML; MG/100ML; MG/100ML; MG/100ML
30 INJECTION, SOLUTION INTRAVENOUS CONTINUOUS
Status: CANCELLED | OUTPATIENT
Start: 2020-08-21

## 2020-08-18 NOTE — H&P (VIEW-ONLY)
[facility] BARIATRIC SURGERY  [address] [address]  [phone]  [address] [address]  [phone]  Dept: [phone]  8/18/2020      Armin Viramontes.  [id number]  [id number]  1970  female      Chief Complaint   Patient presents with   • Post-op     POST OP SLEEVE ISSUES       BH Post-Op Bariatric Surgery:   Armin Viramontes is status post Laparoscopic Sleeve procedure, performed on 7/22/20     HPI:   Today's weight is 102 kg (225 lb) pounds, today's BMI is Body mass index is 42.54 kg/m²., she has a  loss of 8 pounds since the last visit and her weight loss since surgery is 40 pounds. The patient reports a decreased portion size and loss of appetite.      Armin Viramontes denies fever chills chest pain shortness of air abdominal pain or lower extremity pain and reports nausea that is improving and that is intermittent.  Rare emesis but only occurs when smells something malodorous.  No dysphagia.     Diet and Exercise: Diet history reviewed and discussed with the patient. Weight loss/gains to date discussed with the patient. The patient states they are eating 30 grams of protein per day. She reports eating 3 meals per day, a typical portion size of 1/2 cup, eating 3 snacks per day, drinking 4 or more 8-oz. glasses of water per day, no carbonated beverage consumption and exercising regularly.     Supplements: Bariatric per gastric sleeve.     Review of Systems   Constitutional: Positive for fatigue.   Gastrointestinal: Positive for constipation and nausea.   All other systems reviewed and are negative.      Patient Active Problem List   Diagnosis   • Seasonal allergies   • Hyperlipidemia   • GERD (gastroesophageal reflux disease)   • HPV in female   • Family history of ovarian cancer   • Lymphadenopathy   • Iron deficiency anemia due to chronic blood loss   • Insomnia   • Chronic fatigue   • JAZMYN (obstructive sleep  apnea)   • Multiple joint pain   • Prediabetes   • SOB (shortness of breath)   • Leg swelling   • Chest pain, atypical   • Gastric polyps   • Obesity, Class III, BMI 40-49.9 (morbid obesity) (CMS/HCC)   • Dietary counseling   • S/P laparoscopic sleeve gastrectomy   • Nausea       Past Medical History:   Diagnosis Date   • Anemia    • Arthritis     osteo   • Dermatitis    • GERD (gastroesophageal reflux disease)    • Hair loss    • History of obesity    • HPV in female 2012   • Hyperlipidemia    • Infectious mononucleosis    • Lymphadenopathy 02/2019    Right axilla   • JAZMYN (obstructive sleep apnea)     CPAP   • Patient exposure to body fluids    • Plantar fasciitis, bilateral    • Seasonal allergies        The following portions of the patient's history were reviewed and updated as appropriate: allergies, current medications, past family history, past medical history, past social history, past surgical history and problem list.    Vitals:    08/18/20 1435   BP: 150/93   Pulse: 83   Resp: 18   Temp: 98 °F (36.7 °C)       Physical Exam   Constitutional: She is oriented to person, place, and time. She appears well-nourished.   HENT:   Head: Normocephalic and atraumatic.   Mouth/Throat: Oropharynx is clear and moist.   Eyes: Pupils are equal, round, and reactive to light. Conjunctivae and EOM are normal. No scleral icterus.   Neck: Normal range of motion. Neck supple. No thyromegaly present.   Cardiovascular: Normal rate and regular rhythm.   Pulmonary/Chest: Effort normal and breath sounds normal.   Abdominal: Soft. Bowel sounds are normal. She exhibits no distension and no mass. There is no tenderness. There is no rebound and no guarding. No hernia.   Musculoskeletal: Normal range of motion.   Lymphadenopathy:     She has no cervical adenopathy.   Neurological: She is alert and oriented to person, place, and time. No cranial nerve deficit. Coordination normal.   Skin: Skin is warm and dry. No erythema.   Psychiatric:  She has a normal mood and affect. Her behavior is normal.   Vitals reviewed.      Assessment:   Post-op, the patient 3 weeks status post laparoscopic sleeve gastrectomy with nausea that is improving slightly.  She is very anxious and states that she thinks that is a large part of the problem.  She denies any fever or chills and hemodynamically is normal today with slightly elevated blood pressure.  She is tolerating her medications without any difficulty.  We did prescribe Reglan but she did not want to take because of the concern regarding side effects.  She may try this after knowing that she did get it in the hospital.  We will go ahead and proceed with upper endoscopy with dilatation of the pylorus.  I instructed her about drinking warm to hot liquids to help prime her gastric sleeve prior to eating.  The risks and benefits of the procedure were discussed with the patient in detail and all questions were answered.  Possibility of perforation, bleeding, aspiration, anoxic brain injury, respiratory and/or cardiac arrest and death were discussed.  Consent will be signed and witnessed..     Encounter Diagnoses   Name Primary?   • Obesity, Class III, BMI 40-49.9 (morbid obesity) (CMS/HCC) Yes   • S/P laparoscopic sleeve gastrectomy    • Nausea    • Dietary counseling    • Chronic fatigue        Plan:     Encouraged patient to be sure to get plenty of lean protein per day through small frequent meals all with a protein source.   Activity restrictions: none.   Recommended patient be sure to get at least 70 grams of protein per day by eating small, frequent meals all with high lean protein choices. Be sure to limit/cut back on daily carbohydrate intake. Discussed with the patient the recommended amount of water per day to intake- half of body weight in ounces. Reviewed vitamin requirements. Be sure to do routine exercise, 150 minutes per week minimum, including both cardio and strength training.     Instructions /  Recommendations: dietary counseling recommended, recommended a daily protein intake of  grams, vitamin supplement(s) recommended, recommended exercising at least 150 minutes per week, behavior modifications recommended and instructed to call the office for concerns, questions, or problems.     The patient was instructed to follow up in 1 week for her 1 month follow-up.     The patient was counseled regarding. Total time spent face to face was 20 minutes and 15 minutes was spent counseling.

## 2020-08-18 NOTE — PROGRESS NOTES
[facility] BARIATRIC SURGERY  [address] [address]  [phone]  [address] [address]  [phone]  Dept: [phone]  8/18/2020      Armin Viramontes.  [id number]  [id number]  1970  female      Chief Complaint   Patient presents with   • Post-op     POST OP SLEEVE ISSUES       BH Post-Op Bariatric Surgery:   Armin Viramontes is status post Laparoscopic Sleeve procedure, performed on 7/22/20     HPI:   Today's weight is 102 kg (225 lb) pounds, today's BMI is Body mass index is 42.54 kg/m²., she has a  loss of 8 pounds since the last visit and her weight loss since surgery is 40 pounds. The patient reports a decreased portion size and loss of appetite.      Armin Viramontes denies fever chills chest pain shortness of air abdominal pain or lower extremity pain and reports nausea that is improving and that is intermittent.  Rare emesis but only occurs when smells something malodorous.  No dysphagia.     Diet and Exercise: Diet history reviewed and discussed with the patient. Weight loss/gains to date discussed with the patient. The patient states they are eating 30 grams of protein per day. She reports eating 3 meals per day, a typical portion size of 1/2 cup, eating 3 snacks per day, drinking 4 or more 8-oz. glasses of water per day, no carbonated beverage consumption and exercising regularly.     Supplements: Bariatric per gastric sleeve.     Review of Systems   Constitutional: Positive for fatigue.   Gastrointestinal: Positive for constipation and nausea.   All other systems reviewed and are negative.      Patient Active Problem List   Diagnosis   • Seasonal allergies   • Hyperlipidemia   • GERD (gastroesophageal reflux disease)   • HPV in female   • Family history of ovarian cancer   • Lymphadenopathy   • Iron deficiency anemia due to chronic blood loss   • Insomnia   • Chronic fatigue   • AJZMYN (obstructive sleep  apnea)   • Multiple joint pain   • Prediabetes   • SOB (shortness of breath)   • Leg swelling   • Chest pain, atypical   • Gastric polyps   • Obesity, Class III, BMI 40-49.9 (morbid obesity) (CMS/HCC)   • Dietary counseling   • S/P laparoscopic sleeve gastrectomy   • Nausea       Past Medical History:   Diagnosis Date   • Anemia    • Arthritis     osteo   • Dermatitis    • GERD (gastroesophageal reflux disease)    • Hair loss    • History of obesity    • HPV in female 2012   • Hyperlipidemia    • Infectious mononucleosis    • Lymphadenopathy 02/2019    Right axilla   • JAZMYN (obstructive sleep apnea)     CPAP   • Patient exposure to body fluids    • Plantar fasciitis, bilateral    • Seasonal allergies        The following portions of the patient's history were reviewed and updated as appropriate: allergies, current medications, past family history, past medical history, past social history, past surgical history and problem list.    Vitals:    08/18/20 1435   BP: 150/93   Pulse: 83   Resp: 18   Temp: 98 °F (36.7 °C)       Physical Exam   Constitutional: She is oriented to person, place, and time. She appears well-nourished.   HENT:   Head: Normocephalic and atraumatic.   Mouth/Throat: Oropharynx is clear and moist.   Eyes: Pupils are equal, round, and reactive to light. Conjunctivae and EOM are normal. No scleral icterus.   Neck: Normal range of motion. Neck supple. No thyromegaly present.   Cardiovascular: Normal rate and regular rhythm.   Pulmonary/Chest: Effort normal and breath sounds normal.   Abdominal: Soft. Bowel sounds are normal. She exhibits no distension and no mass. There is no tenderness. There is no rebound and no guarding. No hernia.   Musculoskeletal: Normal range of motion.   Lymphadenopathy:     She has no cervical adenopathy.   Neurological: She is alert and oriented to person, place, and time. No cranial nerve deficit. Coordination normal.   Skin: Skin is warm and dry. No erythema.   Psychiatric:  She has a normal mood and affect. Her behavior is normal.   Vitals reviewed.      Assessment:   Post-op, the patient 3 weeks status post laparoscopic sleeve gastrectomy with nausea that is improving slightly.  She is very anxious and states that she thinks that is a large part of the problem.  She denies any fever or chills and hemodynamically is normal today with slightly elevated blood pressure.  She is tolerating her medications without any difficulty.  We did prescribe Reglan but she did not want to take because of the concern regarding side effects.  She may try this after knowing that she did get it in the hospital.  We will go ahead and proceed with upper endoscopy with dilatation of the pylorus.  I instructed her about drinking warm to hot liquids to help prime her gastric sleeve prior to eating.  The risks and benefits of the procedure were discussed with the patient in detail and all questions were answered.  Possibility of perforation, bleeding, aspiration, anoxic brain injury, respiratory and/or cardiac arrest and death were discussed.  Consent will be signed and witnessed..     Encounter Diagnoses   Name Primary?   • Obesity, Class III, BMI 40-49.9 (morbid obesity) (CMS/HCC) Yes   • S/P laparoscopic sleeve gastrectomy    • Nausea    • Dietary counseling    • Chronic fatigue        Plan:     Encouraged patient to be sure to get plenty of lean protein per day through small frequent meals all with a protein source.   Activity restrictions: none.   Recommended patient be sure to get at least 70 grams of protein per day by eating small, frequent meals all with high lean protein choices. Be sure to limit/cut back on daily carbohydrate intake. Discussed with the patient the recommended amount of water per day to intake- half of body weight in ounces. Reviewed vitamin requirements. Be sure to do routine exercise, 150 minutes per week minimum, including both cardio and strength training.     Instructions /  Recommendations: dietary counseling recommended, recommended a daily protein intake of  grams, vitamin supplement(s) recommended, recommended exercising at least 150 minutes per week, behavior modifications recommended and instructed to call the office for concerns, questions, or problems.     The patient was instructed to follow up in 1 week for her 1 month follow-up.     The patient was counseled regarding. Total time spent face to face was 20 minutes and 15 minutes was spent counseling.

## 2020-08-19 ENCOUNTER — TELEPHONE (OUTPATIENT)
Dept: BARIATRICS/WEIGHT MGMT | Facility: CLINIC | Age: 50
End: 2020-08-19

## 2020-08-19 ENCOUNTER — TRANSCRIBE ORDERS (OUTPATIENT)
Dept: SLEEP MEDICINE | Facility: HOSPITAL | Age: 50
End: 2020-08-19

## 2020-08-19 ENCOUNTER — LAB (OUTPATIENT)
Dept: LAB | Facility: HOSPITAL | Age: 50
End: 2020-08-19

## 2020-08-19 DIAGNOSIS — Z01.818 OTHER SPECIFIED PRE-OPERATIVE EXAMINATION: ICD-10-CM

## 2020-08-19 DIAGNOSIS — Z01.818 OTHER SPECIFIED PRE-OPERATIVE EXAMINATION: Primary | ICD-10-CM

## 2020-08-19 PROCEDURE — C9803 HOPD COVID-19 SPEC COLLECT: HCPCS

## 2020-08-19 PROCEDURE — U0004 COV-19 TEST NON-CDC HGH THRU: HCPCS

## 2020-08-19 RX ORDER — PROMETHAZINE HYDROCHLORIDE 12.5 MG/1
12.5 TABLET ORAL EVERY 6 HOURS PRN
Qty: 18 TABLET | Refills: 0 | Status: SHIPPED | OUTPATIENT
Start: 2020-08-19 | End: 2020-08-22 | Stop reason: ALTCHOICE

## 2020-08-19 NOTE — TELEPHONE ENCOUNTER
Patient is wanting to try phenergan for nausea, she stated the Zofran does not help. Would you send in phenergan prescription to     RAD[facility]ARTHUR [facility] [address] [facility] - [phone] [facility] [phone] FX    Thank you

## 2020-08-20 LAB
REF LAB TEST METHOD: NORMAL
SARS-COV-2 RNA RESP QL NAA+PROBE: NOT DETECTED

## 2020-08-21 ENCOUNTER — ANESTHESIA EVENT (OUTPATIENT)
Dept: GASTROENTEROLOGY | Facility: HOSPITAL | Age: 50
End: 2020-08-21

## 2020-08-21 ENCOUNTER — ANESTHESIA (OUTPATIENT)
Dept: GASTROENTEROLOGY | Facility: HOSPITAL | Age: 50
End: 2020-08-21

## 2020-08-21 ENCOUNTER — HOSPITAL ENCOUNTER (OUTPATIENT)
Facility: HOSPITAL | Age: 50
Setting detail: HOSPITAL OUTPATIENT SURGERY
Discharge: HOME | End: 2020-08-21
Attending: SURGERY | Admitting: SURGERY

## 2020-08-21 VITALS
DIASTOLIC BLOOD PRESSURE: 94 MMHG | RESPIRATION RATE: 16 BRPM | TEMPERATURE: 97.9 F | HEART RATE: 79 BPM | WEIGHT: 223 LBS | HEIGHT: 61 IN | OXYGEN SATURATION: 98 % | SYSTOLIC BLOOD PRESSURE: 141 MMHG | BODY MASS INDEX: 42.1 KG/M2

## 2020-08-21 DIAGNOSIS — R53.82 CHRONIC FATIGUE: ICD-10-CM

## 2020-08-21 DIAGNOSIS — E66.01 OBESITY, CLASS III, BMI 40-49.9 (MORBID OBESITY) (HCC): ICD-10-CM

## 2020-08-21 DIAGNOSIS — Z71.3 DIETARY COUNSELING: ICD-10-CM

## 2020-08-21 DIAGNOSIS — R11.0 NAUSEA: ICD-10-CM

## 2020-08-21 DIAGNOSIS — Z98.84 S/P LAPAROSCOPIC SLEEVE GASTRECTOMY: ICD-10-CM

## 2020-08-21 PROCEDURE — 25010000002 ONDANSETRON PER 1 MG: Performed by: ANESTHESIOLOGY

## 2020-08-21 PROCEDURE — 25010000002 METOCLOPRAMIDE PER 10 MG: Performed by: SURGERY

## 2020-08-21 PROCEDURE — 81025 URINE PREGNANCY TEST: CPT | Performed by: SURGERY

## 2020-08-21 PROCEDURE — 25010000002 PROPOFOL 10 MG/ML EMULSION: Performed by: ANESTHESIOLOGY

## 2020-08-21 PROCEDURE — C1726 CATH, BAL DIL, NON-VASCULAR: HCPCS | Performed by: SURGERY

## 2020-08-21 PROCEDURE — 43245 EGD DILATE STRICTURE: CPT | Performed by: SURGERY

## 2020-08-21 RX ORDER — GLYCOPYRROLATE 0.2 MG/ML
INJECTION INTRAMUSCULAR; INTRAVENOUS AS NEEDED
Status: DISCONTINUED | OUTPATIENT
Start: 2020-08-21 | End: 2020-08-21 | Stop reason: SURG

## 2020-08-21 RX ORDER — METOCLOPRAMIDE HYDROCHLORIDE 5 MG/ML
10 INJECTION INTRAMUSCULAR; INTRAVENOUS ONCE
Status: COMPLETED | OUTPATIENT
Start: 2020-08-21 | End: 2020-08-21

## 2020-08-21 RX ORDER — SODIUM CHLORIDE, SODIUM LACTATE, POTASSIUM CHLORIDE, CALCIUM CHLORIDE 600; 310; 30; 20 MG/100ML; MG/100ML; MG/100ML; MG/100ML
30 INJECTION, SOLUTION INTRAVENOUS CONTINUOUS
Status: DISCONTINUED | OUTPATIENT
Start: 2020-08-21 | End: 2020-08-21 | Stop reason: HOSPADM

## 2020-08-21 RX ORDER — PROPOFOL 10 MG/ML
VIAL (ML) INTRAVENOUS AS NEEDED
Status: DISCONTINUED | OUTPATIENT
Start: 2020-08-21 | End: 2020-08-21 | Stop reason: SURG

## 2020-08-21 RX ORDER — ONDANSETRON 2 MG/ML
INJECTION INTRAMUSCULAR; INTRAVENOUS AS NEEDED
Status: DISCONTINUED | OUTPATIENT
Start: 2020-08-21 | End: 2020-08-21 | Stop reason: SURG

## 2020-08-21 RX ORDER — LIDOCAINE HYDROCHLORIDE 20 MG/ML
INJECTION, SOLUTION INFILTRATION; PERINEURAL AS NEEDED
Status: DISCONTINUED | OUTPATIENT
Start: 2020-08-21 | End: 2020-08-21 | Stop reason: SURG

## 2020-08-21 RX ADMIN — PROPOFOL 50 MG: 10 INJECTION, EMULSION INTRAVENOUS at 07:31

## 2020-08-21 RX ADMIN — ONDANSETRON HYDROCHLORIDE 4 MG: 2 SOLUTION INTRAMUSCULAR; INTRAVENOUS at 07:25

## 2020-08-21 RX ADMIN — GLYCOPYRROLATE 0.2 MCG: 0.2 INJECTION INTRAMUSCULAR; INTRAVENOUS at 07:25

## 2020-08-21 RX ADMIN — SODIUM CHLORIDE, POTASSIUM CHLORIDE, SODIUM LACTATE AND CALCIUM CHLORIDE 30 ML/HR: 600; 310; 30; 20 INJECTION, SOLUTION INTRAVENOUS at 07:05

## 2020-08-21 RX ADMIN — LIDOCAINE HYDROCHLORIDE 60 MG: 20 INJECTION, SOLUTION INFILTRATION; PERINEURAL at 07:25

## 2020-08-21 RX ADMIN — PROPOFOL 200 MG: 10 INJECTION, EMULSION INTRAVENOUS at 07:26

## 2020-08-21 RX ADMIN — METOCLOPRAMIDE 10 MG: 5 INJECTION, SOLUTION INTRAMUSCULAR; INTRAVENOUS at 07:54

## 2020-08-21 NOTE — DISCHARGE INSTRUCTIONS
For the next 24 hours patient needs to be with a responsible adult.    For 24 hours DO NOT drive, operate machinery, appliances, drink alcohol, make important decisions or sign legal documents.    Start with a light or bland diet and advance to regular diet as tolerated.    Follow recommendations on procedure report provided by your doctor.    Call Dr Stafford for problems [phone]    Problems may include but not limited to: large amounts of bleeding, trouble breathing, repeated vomiting, severe unrelieved pain, fever or chills.

## 2020-08-21 NOTE — ANESTHESIA PREPROCEDURE EVALUATION
Anesthesia Evaluation     Patient summary reviewed and Nursing notes reviewed                Airway   Mallampati: I  TM distance: >3 FB  Neck ROM: full  No difficulty expected  Dental - normal exam     Pulmonary - normal exam   (+) shortness of breath, sleep apnea,   Cardiovascular - normal exam    (+) hyperlipidemia,     ROS comment: EF 60%    Neuro/Psych- negative ROS  GI/Hepatic/Renal/Endo    (+) obesity, morbid obesity, GERD,      Musculoskeletal     Abdominal  - normal exam    Bowel sounds: normal.   Substance History - negative use     OB/GYN negative ob/gyn ROS         Other   arthritis,                      Anesthesia Plan    ASA 3     MAC       Anesthetic plan, all risks, benefits, and alternatives have been provided, discussed and informed consent has been obtained with: patient.

## 2020-08-21 NOTE — OP NOTE
Surgeon: Andrey Stafford Jr., M.D.    Preoperative Diagnosis: Dysphagia with solids status post laparoscopic sleeve gastrectomy 1 month ago    Postoperative Diagnosis: Same    Procedure Performed: Transoral esophagogastroduodenoscopy with 18-20 balloon dilatation of pylorus    Indications: 50-year-old female status post laparoscopic sleeve gastrectomy approximately 1 month ago with complaints of dysphasia with solids over the past week.  She also having nausea and vomiting.    Procedure:     The procedure, indications, preparation and potential, patient were explained to the patient, who indicated understanding and signed the corresponding consent forms.  The patient was identified, taken to the endoscopy suite, and placed on the left side down decubitus position.  The patient underwent a MAC anesthesia and was appropriately monitored through the case by the anesthesia personnel using continuous pulse oximetry, blood pressure, and cardiac monitoring.  A bite block was placed.  After adequate IV sedation and using a transoral technique a lubed flexible endoscope was placed in the hypopharynx and advanced to the second portion of the duodenum.  The pylorus was mildly strictured and the scope had to be advanced with some pressure into the duodenum.  The scope was then withdrawn back into the gastric sleeve.  There was no stricture noted in the gastric sleeve unless dictated below.  No polyps or ulcers were seen unless dictated below.  The scope was then withdrawn back into the esophagus.  The Z line was regular and no erosive esophagitis seen unless dictated below.  Scope was then advanced back down into the antrum.  A 18-20 balloon catheter was then advanced across the pylorus and taken up in sequence and each level held for approximately 1 minute.  The catheter was deflated and removed.  The pylorus dilated up nicely.  The scope was then completely withdrawn after decompressing the stomach.  The patient tolerated the  procedure well and left the endoscopy suite in stable condition.    The sleeve was of normal size without evidence of strictures or dilatation.    Recommendations:     Follow-up in the office as scheduled.'

## 2020-08-21 NOTE — ANESTHESIA POSTPROCEDURE EVALUATION
"Patient: Armin Viramontes    Procedure Summary     Date:  08/21/20 Room / Location:   [facility] /  [facility]    Anesthesia Start:  0722 Anesthesia Stop:  0740    Procedure:  ESOPHAGOGASTRODUODENOSCOPY WITH BALLOON DILATATION 18-20MM (N/A Esophagus) Diagnosis:       Obesity, Class III, BMI 40-49.9 (morbid obesity) (CMS/HCC)      S/P laparoscopic sleeve gastrectomy      Nausea      Dietary counseling      Chronic fatigue      (Obesity, Class III, BMI 40-49.9 (morbid obesity) (CMS/HCC) [E66.01])      (S/P laparoscopic sleeve gastrectomy [Z98.84])      (Nausea [R11.0])      (Dietary counseling [Z71.3])      (Chronic fatigue [R53.82])    Surgeon:  Andrey Stafford Jr., MD Provider:  Naun Fernandez MD    Anesthesia Type:  MAC ASA Status:  3          Anesthesia Type: MAC    Vitals  Vitals Value Taken Time   /94 8/21/2020  8:01 AM   Temp     Pulse 79 8/21/2020  8:01 AM   Resp 16 8/21/2020  8:01 AM   SpO2 98 % 8/21/2020  8:01 AM           Post Anesthesia Care and Evaluation    Patient location during evaluation: PACU  Patient participation: complete - patient participated  Level of consciousness: awake  Pain score: 0  Pain management: adequate  Airway patency: patent  Anesthetic complications: No anesthetic complications  PONV Status: none  Cardiovascular status: acceptable  Respiratory status: acceptable  Hydration status: acceptable    Comments: /94 (BP Location: Left arm, Patient Position: Lying)   Pulse 79   Temp 36.6 °C (97.9 °F) (Oral)   Resp 16   Ht 154.9 cm (61\")   Wt 101 kg (223 lb)   LMP 08/21/2020   SpO2 98%   BMI 42.14 kg/m²       "

## 2020-08-21 NOTE — INTERVAL H&P NOTE
We will proceed with upper endoscopy with dilatation of the pylorus.  The risks and benefits of the procedure were discussed with the patient in detail and all questions were answered.  Possibility of perforation, bleeding, aspiration, anoxic brain injury, respiratory and/or cardiac arrest and death were discussed.  Consent will be signed and witnessed.  H&P reviewed. The patient was examined and there are no changes to the H&P.

## 2020-08-22 ENCOUNTER — DOCUMENTATION (OUTPATIENT)
Dept: BARIATRICS/WEIGHT MGMT | Facility: CLINIC | Age: 50
End: 2020-08-22

## 2020-08-22 RX ORDER — ONDANSETRON 8 MG/1
8 TABLET, ORALLY DISINTEGRATING ORAL EVERY 8 HOURS PRN
Qty: 21 TABLET | Refills: 1 | Status: ON HOLD | OUTPATIENT
Start: 2020-08-22 | End: 2020-08-30

## 2020-08-22 NOTE — PROGRESS NOTES
Patient called reporting nausea, vomiting, dry heaving, intolerance of oral intake. She did have an EGD with dilation of her pylorus on Friday. She was prescribed reglan but never started it. Reports zofran and phenergan have not helped with nausea.    Patient was encourages to return to GI rest today. We will come off phenergan and do a higher dose of dissolvable zofran and was encouraged to start reglan. Will get labs work on Monday to rule out dehydration and if warranted, arrange for fluid rehydration with thiamine.

## 2020-08-24 ENCOUNTER — TELEPHONE (OUTPATIENT)
Dept: BARIATRICS/WEIGHT MGMT | Facility: CLINIC | Age: 50
End: 2020-08-24

## 2020-08-24 ENCOUNTER — LAB (OUTPATIENT)
Dept: LAB | Facility: HOSPITAL | Age: 50
End: 2020-08-24

## 2020-08-24 DIAGNOSIS — E66.01 OBESITY, CLASS III, BMI 40-49.9 (MORBID OBESITY) (HCC): Primary | ICD-10-CM

## 2020-08-24 DIAGNOSIS — E66.01 OBESITY, CLASS III, BMI 40-49.9 (MORBID OBESITY) (HCC): ICD-10-CM

## 2020-08-24 DIAGNOSIS — G47.33 OSA (OBSTRUCTIVE SLEEP APNEA): ICD-10-CM

## 2020-08-24 LAB
ALBUMIN SERPL-MCNC: 3.9 G/DL (ref 3.5–5.2)
ALBUMIN/GLOB SERPL: 1.3 G/DL
ALP SERPL-CCNC: 78 U/L (ref 39–117)
ALT SERPL W P-5'-P-CCNC: 37 U/L (ref 1–33)
ANION GAP SERPL CALCULATED.3IONS-SCNC: 20 MMOL/L (ref 5–15)
AST SERPL-CCNC: 27 U/L (ref 1–32)
BASOPHILS # BLD AUTO: 0.05 10*3/MM3 (ref 0–0.2)
BASOPHILS NFR BLD AUTO: 0.6 % (ref 0–1.5)
BILIRUB SERPL-MCNC: 0.4 MG/DL (ref 0–1.2)
BUN SERPL-MCNC: 13 MG/DL (ref 6–20)
BUN/CREAT SERPL: 14 (ref 7–25)
CALCIUM SPEC-SCNC: 9.4 MG/DL (ref 8.6–10.5)
CHLORIDE SERPL-SCNC: 98 MMOL/L (ref 98–107)
CO2 SERPL-SCNC: 20 MMOL/L (ref 22–29)
CREAT SERPL-MCNC: 0.93 MG/DL (ref 0.57–1)
DEPRECATED RDW RBC AUTO: 45.6 FL (ref 37–54)
EOSINOPHIL # BLD AUTO: 0.41 10*3/MM3 (ref 0–0.4)
EOSINOPHIL NFR BLD AUTO: 5.3 % (ref 0.3–6.2)
ERYTHROCYTE [DISTWIDTH] IN BLOOD BY AUTOMATED COUNT: 15.6 % (ref 12.3–15.4)
GFR SERPL CREATININE-BSD FRML MDRD: 64 ML/MIN/1.73
GLOBULIN UR ELPH-MCNC: 3 GM/DL
GLUCOSE SERPL-MCNC: 99 MG/DL (ref 65–99)
HCT VFR BLD AUTO: 40.7 % (ref 34–46.6)
HGB BLD-MCNC: 13.6 G/DL (ref 12–15.9)
IMM GRANULOCYTES # BLD AUTO: 0.02 10*3/MM3 (ref 0–0.05)
IMM GRANULOCYTES NFR BLD AUTO: 0.3 % (ref 0–0.5)
LYMPHOCYTES # BLD AUTO: 1.98 10*3/MM3 (ref 0.7–3.1)
LYMPHOCYTES NFR BLD AUTO: 25.5 % (ref 19.6–45.3)
MCH RBC QN AUTO: 27.1 PG (ref 26.6–33)
MCHC RBC AUTO-ENTMCNC: 33.4 G/DL (ref 31.5–35.7)
MCV RBC AUTO: 81.2 FL (ref 79–97)
MONOCYTES # BLD AUTO: 0.72 10*3/MM3 (ref 0.1–0.9)
MONOCYTES NFR BLD AUTO: 9.3 % (ref 5–12)
NEUTROPHILS NFR BLD AUTO: 4.59 10*3/MM3 (ref 1.7–7)
NEUTROPHILS NFR BLD AUTO: 59 % (ref 42.7–76)
NRBC BLD AUTO-RTO: 0 /100 WBC (ref 0–0.2)
PLATELET # BLD AUTO: 263 10*3/MM3 (ref 140–450)
PMV BLD AUTO: 11.9 FL (ref 6–12)
POTASSIUM SERPL-SCNC: 3.6 MMOL/L (ref 3.5–5.2)
PROT SERPL-MCNC: 6.9 G/DL (ref 6–8.5)
RBC # BLD AUTO: 5.01 10*6/MM3 (ref 3.77–5.28)
SODIUM SERPL-SCNC: 138 MMOL/L (ref 136–145)
WBC # BLD AUTO: 7.77 10*3/MM3 (ref 3.4–10.8)

## 2020-08-24 PROCEDURE — 80053 COMPREHEN METABOLIC PANEL: CPT

## 2020-08-24 PROCEDURE — 36415 COLL VENOUS BLD VENIPUNCTURE: CPT

## 2020-08-24 PROCEDURE — 85025 COMPLETE CBC W/AUTO DIFF WBC: CPT

## 2020-08-24 NOTE — TELEPHONE ENCOUNTER
Spoke to patient and informed her of her normal lab results. Patient stated that she has felt better this afternoon then she has been, she said taking Reglan and higher dose of Zofran has seemed to help. Patient is trying to eat and drink, I encouraged her to do so as much as she can tolerate. Patient was complaining of no longer feeling when she has to urinate but when she does, her bladder is full. I advised patient that if this continuous, she would need to follow up with her PCP. Patient has an appointment scheduled with us Thursday but was advised to call us in the mean time with any other questions or concerns.       ----- Message from MANI Reddy sent at 8/24/2020  2:38 PM EDT -----  These look good!

## 2020-08-27 ENCOUNTER — TELEPHONE (OUTPATIENT)
Dept: BARIATRICS/WEIGHT MGMT | Facility: CLINIC | Age: 50
End: 2020-08-27

## 2020-08-27 ENCOUNTER — HOSPITAL ENCOUNTER (OUTPATIENT)
Facility: HOSPITAL | Age: 50
Setting detail: OBSERVATION
Discharge: HOME | End: 2020-08-30
Attending: SURGERY | Admitting: SURGERY

## 2020-08-27 PROBLEM — R11.2 N&V (NAUSEA AND VOMITING): Status: ACTIVE | Noted: 2020-08-27

## 2020-08-27 PROCEDURE — G0378 HOSPITAL OBSERVATION PER HR: HCPCS

## 2020-08-27 PROCEDURE — 96365 THER/PROPH/DIAG IV INF INIT: CPT

## 2020-08-27 PROCEDURE — 96372 THER/PROPH/DIAG INJ SC/IM: CPT

## 2020-08-27 PROCEDURE — 25010000002 THIAMINE PER 100 MG: Performed by: SURGERY

## 2020-08-27 PROCEDURE — 25010000002 ONDANSETRON PER 1 MG: Performed by: SURGERY

## 2020-08-27 PROCEDURE — 25010000002 ENOXAPARIN PER 10 MG: Performed by: SURGERY

## 2020-08-27 PROCEDURE — 96375 TX/PRO/DX INJ NEW DRUG ADDON: CPT

## 2020-08-27 PROCEDURE — 25010000002 CYANOCOBALAMIN PER 1000 MCG: Performed by: SURGERY

## 2020-08-27 PROCEDURE — C9803 HOPD COVID-19 SPEC COLLECT: HCPCS

## 2020-08-27 PROCEDURE — U0004 COV-19 TEST NON-CDC HGH THRU: HCPCS | Performed by: SURGERY

## 2020-08-27 RX ORDER — FAMOTIDINE 10 MG/ML
20 INJECTION, SOLUTION INTRAVENOUS EVERY 12 HOURS SCHEDULED
Status: DISCONTINUED | OUTPATIENT
Start: 2020-08-27 | End: 2020-08-30 | Stop reason: HOSPADM

## 2020-08-27 RX ORDER — SODIUM CHLORIDE 0.9 % (FLUSH) 0.9 %
10 SYRINGE (ML) INJECTION EVERY 12 HOURS SCHEDULED
Status: DISCONTINUED | OUTPATIENT
Start: 2020-08-27 | End: 2020-08-30 | Stop reason: HOSPADM

## 2020-08-27 RX ORDER — SODIUM CHLORIDE, SODIUM LACTATE, POTASSIUM CHLORIDE, CALCIUM CHLORIDE 600; 310; 30; 20 MG/100ML; MG/100ML; MG/100ML; MG/100ML
150 INJECTION, SOLUTION INTRAVENOUS CONTINUOUS
Status: DISCONTINUED | OUTPATIENT
Start: 2020-08-27 | End: 2020-08-29

## 2020-08-27 RX ORDER — PROMETHAZINE HYDROCHLORIDE 12.5 MG/1
12.5 TABLET ORAL EVERY 6 HOURS PRN
Status: DISCONTINUED | OUTPATIENT
Start: 2020-08-27 | End: 2020-08-30 | Stop reason: HOSPADM

## 2020-08-27 RX ORDER — CYANOCOBALAMIN 1000 UG/ML
1000 INJECTION, SOLUTION INTRAMUSCULAR; SUBCUTANEOUS ONCE
Status: COMPLETED | OUTPATIENT
Start: 2020-08-27 | End: 2020-08-27

## 2020-08-27 RX ORDER — METOCLOPRAMIDE HYDROCHLORIDE 5 MG/ML
10 INJECTION INTRAMUSCULAR; INTRAVENOUS EVERY 6 HOURS PRN
Status: DISCONTINUED | OUTPATIENT
Start: 2020-08-27 | End: 2020-08-30 | Stop reason: HOSPADM

## 2020-08-27 RX ORDER — SODIUM CHLORIDE 0.9 % (FLUSH) 0.9 %
10 SYRINGE (ML) INJECTION AS NEEDED
Status: DISCONTINUED | OUTPATIENT
Start: 2020-08-27 | End: 2020-08-30 | Stop reason: HOSPADM

## 2020-08-27 RX ORDER — ONDANSETRON 4 MG/1
4 TABLET, FILM COATED ORAL EVERY 4 HOURS PRN
Status: DISCONTINUED | OUTPATIENT
Start: 2020-08-27 | End: 2020-08-30 | Stop reason: HOSPADM

## 2020-08-27 RX ORDER — ONDANSETRON 2 MG/ML
4 INJECTION INTRAMUSCULAR; INTRAVENOUS EVERY 4 HOURS PRN
Status: DISCONTINUED | OUTPATIENT
Start: 2020-08-27 | End: 2020-08-30 | Stop reason: HOSPADM

## 2020-08-27 RX ORDER — METOCLOPRAMIDE 10 MG/1
10 TABLET ORAL 4 TIMES DAILY
COMMUNITY
End: 2020-11-05

## 2020-08-27 RX ORDER — ONDANSETRON 4 MG/1
4 TABLET, ORALLY DISINTEGRATING ORAL EVERY 4 HOURS PRN
Status: DISCONTINUED | OUTPATIENT
Start: 2020-08-27 | End: 2020-08-30 | Stop reason: HOSPADM

## 2020-08-27 RX ORDER — HYDROMORPHONE HYDROCHLORIDE 1 MG/ML
0.5 INJECTION, SOLUTION INTRAMUSCULAR; INTRAVENOUS; SUBCUTANEOUS
Status: DISCONTINUED | OUTPATIENT
Start: 2020-08-27 | End: 2020-08-30 | Stop reason: HOSPADM

## 2020-08-27 RX ORDER — LORAZEPAM 2 MG/ML
0.5 INJECTION INTRAMUSCULAR EVERY 6 HOURS PRN
Status: DISCONTINUED | OUTPATIENT
Start: 2020-08-27 | End: 2020-08-30 | Stop reason: HOSPADM

## 2020-08-27 RX ORDER — SCOLOPAMINE TRANSDERMAL SYSTEM 1 MG/1
1 PATCH, EXTENDED RELEASE TRANSDERMAL
Status: DISCONTINUED | OUTPATIENT
Start: 2020-08-27 | End: 2020-08-30 | Stop reason: HOSPADM

## 2020-08-27 RX ORDER — HYDRALAZINE HYDROCHLORIDE 20 MG/ML
10 INJECTION INTRAMUSCULAR; INTRAVENOUS EVERY 6 HOURS PRN
Status: DISCONTINUED | OUTPATIENT
Start: 2020-08-27 | End: 2020-08-30 | Stop reason: HOSPADM

## 2020-08-27 RX ORDER — CITALOPRAM 10 MG/1
10 TABLET ORAL DAILY
Status: DISCONTINUED | OUTPATIENT
Start: 2020-08-28 | End: 2020-08-30 | Stop reason: HOSPADM

## 2020-08-27 RX ORDER — NALOXONE HCL 0.4 MG/ML
0.4 VIAL (ML) INJECTION
Status: DISCONTINUED | OUTPATIENT
Start: 2020-08-27 | End: 2020-08-30 | Stop reason: HOSPADM

## 2020-08-27 RX ADMIN — SODIUM CHLORIDE, POTASSIUM CHLORIDE, SODIUM LACTATE AND CALCIUM CHLORIDE 1000 ML: 600; 310; 30; 20 INJECTION, SOLUTION INTRAVENOUS at 15:36

## 2020-08-27 RX ADMIN — CYANOCOBALAMIN 1000 MCG: 1000 INJECTION, SOLUTION INTRAMUSCULAR at 15:42

## 2020-08-27 RX ADMIN — SODIUM CHLORIDE, POTASSIUM CHLORIDE, SODIUM LACTATE AND CALCIUM CHLORIDE 150 ML/HR: 600; 310; 30; 20 INJECTION, SOLUTION INTRAVENOUS at 16:40

## 2020-08-27 RX ADMIN — ONDANSETRON 4 MG: 2 INJECTION INTRAMUSCULAR; INTRAVENOUS at 23:41

## 2020-08-27 RX ADMIN — SODIUM CHLORIDE, PRESERVATIVE FREE 10 ML: 5 INJECTION INTRAVENOUS at 20:22

## 2020-08-27 RX ADMIN — Medication 10 ML: at 22:14

## 2020-08-27 RX ADMIN — THIAMINE HYDROCHLORIDE 100 MG: 100 INJECTION, SOLUTION INTRAMUSCULAR; INTRAVENOUS at 16:40

## 2020-08-27 RX ADMIN — SCOPALAMINE 1 PATCH: 1 PATCH, EXTENDED RELEASE TRANSDERMAL at 17:24

## 2020-08-27 RX ADMIN — ENOXAPARIN SODIUM 40 MG: 40 INJECTION SUBCUTANEOUS at 15:42

## 2020-08-27 RX ADMIN — FAMOTIDINE 20 MG: 10 INJECTION INTRAVENOUS at 20:22

## 2020-08-27 NOTE — TELEPHONE ENCOUNTER
Patient called this morning stating she has been vomiting multiple times a day, she has been taking Reglan for a week and nothing seems to be helping. Patient was scheduled for her 1 month follow up today in office with Dr. Stafford but she expressed that she wants to go to the ER and be admitted. I spoke with Dr. Stafford and he is going to admit her. Patient was notified that hospital will be calling her.

## 2020-08-28 LAB
ALBUMIN SERPL-MCNC: 3.1 G/DL (ref 3.5–5.2)
ALBUMIN/GLOB SERPL: 1.1 G/DL
ALP SERPL-CCNC: 62 U/L (ref 39–117)
ALT SERPL W P-5'-P-CCNC: 23 U/L (ref 1–33)
ANION GAP SERPL CALCULATED.3IONS-SCNC: 15.5 MMOL/L (ref 5–15)
AST SERPL-CCNC: 18 U/L (ref 1–32)
BASOPHILS # BLD AUTO: 0.04 10*3/MM3 (ref 0–0.2)
BASOPHILS NFR BLD AUTO: 0.5 % (ref 0–1.5)
BILIRUB SERPL-MCNC: 0.3 MG/DL (ref 0–1.2)
BUN SERPL-MCNC: 10 MG/DL (ref 6–20)
BUN/CREAT SERPL: 13.2 (ref 7–25)
CALCIUM SPEC-SCNC: 8.8 MG/DL (ref 8.6–10.5)
CHLORIDE SERPL-SCNC: 98 MMOL/L (ref 98–107)
CO2 SERPL-SCNC: 23.5 MMOL/L (ref 22–29)
CREAT SERPL-MCNC: 0.76 MG/DL (ref 0.57–1)
DEPRECATED RDW RBC AUTO: 50.2 FL (ref 37–54)
EOSINOPHIL # BLD AUTO: 0.6 10*3/MM3 (ref 0–0.4)
EOSINOPHIL NFR BLD AUTO: 8.2 % (ref 0.3–6.2)
ERYTHROCYTE [DISTWIDTH] IN BLOOD BY AUTOMATED COUNT: 16.1 % (ref 12.3–15.4)
GFR SERPL CREATININE-BSD FRML MDRD: 81 ML/MIN/1.73
GLOBULIN UR ELPH-MCNC: 2.9 GM/DL
GLUCOSE SERPL-MCNC: 83 MG/DL (ref 65–99)
HCT VFR BLD AUTO: 37.7 % (ref 34–46.6)
HGB BLD-MCNC: 11.8 G/DL (ref 12–15.9)
IMM GRANULOCYTES # BLD AUTO: 0.01 10*3/MM3 (ref 0–0.05)
IMM GRANULOCYTES NFR BLD AUTO: 0.1 % (ref 0–0.5)
LYMPHOCYTES # BLD AUTO: 2.79 10*3/MM3 (ref 0.7–3.1)
LYMPHOCYTES NFR BLD AUTO: 38.1 % (ref 19.6–45.3)
MAGNESIUM SERPL-MCNC: 1.7 MG/DL (ref 1.6–2.6)
MCH RBC QN AUTO: 26.6 PG (ref 26.6–33)
MCHC RBC AUTO-ENTMCNC: 31.3 G/DL (ref 31.5–35.7)
MCV RBC AUTO: 85.1 FL (ref 79–97)
MONOCYTES # BLD AUTO: 0.75 10*3/MM3 (ref 0.1–0.9)
MONOCYTES NFR BLD AUTO: 10.2 % (ref 5–12)
NEUTROPHILS NFR BLD AUTO: 3.14 10*3/MM3 (ref 1.7–7)
NEUTROPHILS NFR BLD AUTO: 42.9 % (ref 42.7–76)
NRBC BLD AUTO-RTO: 0 /100 WBC (ref 0–0.2)
PHOSPHATE SERPL-MCNC: 3.3 MG/DL (ref 2.5–4.5)
PLATELET # BLD AUTO: 209 10*3/MM3 (ref 140–450)
PMV BLD AUTO: 11.3 FL (ref 6–12)
POTASSIUM SERPL-SCNC: 2.9 MMOL/L (ref 3.5–5.2)
PROT SERPL-MCNC: 6 G/DL (ref 6–8.5)
RBC # BLD AUTO: 4.43 10*6/MM3 (ref 3.77–5.28)
REF LAB TEST METHOD: NORMAL
SARS-COV-2 RNA RESP QL NAA+PROBE: NOT DETECTED
SODIUM SERPL-SCNC: 137 MMOL/L (ref 136–145)
WBC # BLD AUTO: 7.33 10*3/MM3 (ref 3.4–10.8)

## 2020-08-28 PROCEDURE — 96372 THER/PROPH/DIAG INJ SC/IM: CPT

## 2020-08-28 PROCEDURE — 85025 COMPLETE CBC W/AUTO DIFF WBC: CPT | Performed by: SURGERY

## 2020-08-28 PROCEDURE — 25010000002 ONDANSETRON PER 1 MG: Performed by: SURGERY

## 2020-08-28 PROCEDURE — G0378 HOSPITAL OBSERVATION PER HR: HCPCS

## 2020-08-28 PROCEDURE — 25010000003 POTASSIUM CHLORIDE 10 MEQ/100ML SOLUTION: Performed by: SURGERY

## 2020-08-28 PROCEDURE — 83735 ASSAY OF MAGNESIUM: CPT | Performed by: SURGERY

## 2020-08-28 PROCEDURE — 25010000002 FOSAPREPITANT PER 1 MG: Performed by: SURGERY

## 2020-08-28 PROCEDURE — 80053 COMPREHEN METABOLIC PANEL: CPT | Performed by: SURGERY

## 2020-08-28 PROCEDURE — 25010000002 THIAMINE PER 100 MG: Performed by: SURGERY

## 2020-08-28 PROCEDURE — 25010000002 ENOXAPARIN PER 10 MG: Performed by: SURGERY

## 2020-08-28 PROCEDURE — 96376 TX/PRO/DX INJ SAME DRUG ADON: CPT

## 2020-08-28 PROCEDURE — 84100 ASSAY OF PHOSPHORUS: CPT | Performed by: SURGERY

## 2020-08-28 RX ORDER — POTASSIUM CHLORIDE 7.45 MG/ML
10 INJECTION INTRAVENOUS
Status: DISCONTINUED | OUTPATIENT
Start: 2020-08-28 | End: 2020-08-30 | Stop reason: HOSPADM

## 2020-08-28 RX ADMIN — SODIUM CHLORIDE 150 MG: 9 INJECTION, SOLUTION INTRAVENOUS at 11:16

## 2020-08-28 RX ADMIN — ONDANSETRON 4 MG: 2 INJECTION INTRAMUSCULAR; INTRAVENOUS at 20:46

## 2020-08-28 RX ADMIN — POTASSIUM CHLORIDE 10 MEQ: 7.46 INJECTION, SOLUTION INTRAVENOUS at 11:16

## 2020-08-28 RX ADMIN — SODIUM CHLORIDE, POTASSIUM CHLORIDE, SODIUM LACTATE AND CALCIUM CHLORIDE 150 ML/HR: 600; 310; 30; 20 INJECTION, SOLUTION INTRAVENOUS at 00:09

## 2020-08-28 RX ADMIN — SODIUM CHLORIDE, POTASSIUM CHLORIDE, SODIUM LACTATE AND CALCIUM CHLORIDE 150 ML/HR: 600; 310; 30; 20 INJECTION, SOLUTION INTRAVENOUS at 20:39

## 2020-08-28 RX ADMIN — POTASSIUM CHLORIDE 10 MEQ: 7.46 INJECTION, SOLUTION INTRAVENOUS at 13:25

## 2020-08-28 RX ADMIN — ONDANSETRON 4 MG: 2 INJECTION INTRAMUSCULAR; INTRAVENOUS at 06:34

## 2020-08-28 RX ADMIN — FAMOTIDINE 20 MG: 10 INJECTION INTRAVENOUS at 08:45

## 2020-08-28 RX ADMIN — ENOXAPARIN SODIUM 40 MG: 40 INJECTION SUBCUTANEOUS at 16:10

## 2020-08-28 RX ADMIN — THIAMINE HYDROCHLORIDE 100 MG: 100 INJECTION, SOLUTION INTRAMUSCULAR; INTRAVENOUS at 08:45

## 2020-08-28 RX ADMIN — SODIUM CHLORIDE, POTASSIUM CHLORIDE, SODIUM LACTATE AND CALCIUM CHLORIDE 150 ML/HR: 600; 310; 30; 20 INJECTION, SOLUTION INTRAVENOUS at 06:28

## 2020-08-28 RX ADMIN — CITALOPRAM 10 MG: 10 TABLET, FILM COATED ORAL at 08:44

## 2020-08-28 RX ADMIN — POTASSIUM CHLORIDE 10 MEQ: 7.46 INJECTION, SOLUTION INTRAVENOUS at 16:10

## 2020-08-28 RX ADMIN — SODIUM CHLORIDE, POTASSIUM CHLORIDE, SODIUM LACTATE AND CALCIUM CHLORIDE 150 ML/HR: 600; 310; 30; 20 INJECTION, SOLUTION INTRAVENOUS at 13:26

## 2020-08-28 RX ADMIN — POTASSIUM CHLORIDE 10 MEQ: 7.46 INJECTION, SOLUTION INTRAVENOUS at 09:55

## 2020-08-28 RX ADMIN — POTASSIUM CHLORIDE 10 MEQ: 7.46 INJECTION, SOLUTION INTRAVENOUS at 19:02

## 2020-08-28 RX ADMIN — POTASSIUM CHLORIDE 10 MEQ: 7.46 INJECTION, SOLUTION INTRAVENOUS at 08:46

## 2020-08-28 RX ADMIN — SODIUM CHLORIDE, PRESERVATIVE FREE 10 ML: 5 INJECTION INTRAVENOUS at 20:39

## 2020-08-28 RX ADMIN — FAMOTIDINE 20 MG: 10 INJECTION INTRAVENOUS at 20:39

## 2020-08-28 RX ADMIN — Medication 10 ML: at 08:45

## 2020-08-29 LAB — POTASSIUM SERPL-SCNC: 3.6 MMOL/L (ref 3.5–5.2)

## 2020-08-29 PROCEDURE — 25010000002 THIAMINE PER 100 MG: Performed by: SURGERY

## 2020-08-29 PROCEDURE — 96366 THER/PROPH/DIAG IV INF ADDON: CPT

## 2020-08-29 PROCEDURE — 84132 ASSAY OF SERUM POTASSIUM: CPT | Performed by: SURGERY

## 2020-08-29 PROCEDURE — 25010000002 ENOXAPARIN PER 10 MG: Performed by: SURGERY

## 2020-08-29 PROCEDURE — G0378 HOSPITAL OBSERVATION PER HR: HCPCS

## 2020-08-29 PROCEDURE — 96367 TX/PROPH/DG ADDL SEQ IV INF: CPT

## 2020-08-29 PROCEDURE — 25010000002 FOSAPREPITANT PER 1 MG: Performed by: SURGERY

## 2020-08-29 PROCEDURE — 96372 THER/PROPH/DIAG INJ SC/IM: CPT

## 2020-08-29 PROCEDURE — 96376 TX/PRO/DX INJ SAME DRUG ADON: CPT

## 2020-08-29 RX ORDER — POTASSIUM CHLORIDE 750 MG/1
40 CAPSULE, EXTENDED RELEASE ORAL AS NEEDED
Status: DISCONTINUED | OUTPATIENT
Start: 2020-08-29 | End: 2020-08-30 | Stop reason: HOSPADM

## 2020-08-29 RX ORDER — POTASSIUM CHLORIDE 1.5 G/1.77G
40 POWDER, FOR SOLUTION ORAL AS NEEDED
Status: DISCONTINUED | OUTPATIENT
Start: 2020-08-29 | End: 2020-08-30 | Stop reason: HOSPADM

## 2020-08-29 RX ORDER — POTASSIUM CHLORIDE 7.45 MG/ML
10 INJECTION INTRAVENOUS
Status: DISCONTINUED | OUTPATIENT
Start: 2020-08-29 | End: 2020-08-30 | Stop reason: HOSPADM

## 2020-08-29 RX ADMIN — POTASSIUM CHLORIDE 40 MEQ: 10 CAPSULE, COATED, EXTENDED RELEASE ORAL at 13:21

## 2020-08-29 RX ADMIN — SODIUM CHLORIDE, PRESERVATIVE FREE 10 ML: 5 INJECTION INTRAVENOUS at 20:54

## 2020-08-29 RX ADMIN — FAMOTIDINE 20 MG: 10 INJECTION INTRAVENOUS at 09:07

## 2020-08-29 RX ADMIN — FAMOTIDINE 20 MG: 10 INJECTION INTRAVENOUS at 20:53

## 2020-08-29 RX ADMIN — ENOXAPARIN SODIUM 40 MG: 40 INJECTION SUBCUTANEOUS at 16:21

## 2020-08-29 RX ADMIN — Medication 10 ML: at 12:12

## 2020-08-29 RX ADMIN — CITALOPRAM 10 MG: 10 TABLET, FILM COATED ORAL at 09:07

## 2020-08-29 RX ADMIN — THIAMINE HYDROCHLORIDE 100 MG: 100 INJECTION, SOLUTION INTRAMUSCULAR; INTRAVENOUS at 12:00

## 2020-08-29 RX ADMIN — SODIUM CHLORIDE 150 MG: 9 INJECTION, SOLUTION INTRAVENOUS at 12:43

## 2020-08-30 VITALS
HEIGHT: 61 IN | BODY MASS INDEX: 42.16 KG/M2 | DIASTOLIC BLOOD PRESSURE: 81 MMHG | RESPIRATION RATE: 16 BRPM | OXYGEN SATURATION: 95 % | HEART RATE: 57 BPM | SYSTOLIC BLOOD PRESSURE: 149 MMHG | TEMPERATURE: 97.8 F

## 2020-08-30 PROCEDURE — 63710000001 ONDANSETRON PER 8 MG: Performed by: SURGERY

## 2020-08-30 PROCEDURE — G0378 HOSPITAL OBSERVATION PER HR: HCPCS

## 2020-08-30 PROCEDURE — 96376 TX/PRO/DX INJ SAME DRUG ADON: CPT

## 2020-08-30 RX ORDER — ONDANSETRON 8 MG/1
8 TABLET, ORALLY DISINTEGRATING ORAL EVERY 8 HOURS PRN
Qty: 21 TABLET | Refills: 1 | Status: SHIPPED | OUTPATIENT
Start: 2020-08-30 | End: 2020-09-06

## 2020-08-30 RX ORDER — APREPITANT 80 MG/1
80 CAPSULE ORAL DAILY
Qty: 3 CAPSULE | Refills: 0 | Status: SHIPPED | OUTPATIENT
Start: 2020-08-30 | End: 2020-11-05

## 2020-08-30 RX ADMIN — SCOPALAMINE 1 PATCH: 1 PATCH, EXTENDED RELEASE TRANSDERMAL at 11:56

## 2020-08-30 RX ADMIN — FAMOTIDINE 20 MG: 10 INJECTION INTRAVENOUS at 08:21

## 2020-08-30 RX ADMIN — ONDANSETRON HYDROCHLORIDE 4 MG: 4 TABLET, FILM COATED ORAL at 08:57

## 2020-08-30 RX ADMIN — CITALOPRAM 10 MG: 10 TABLET, FILM COATED ORAL at 08:21

## 2020-09-03 ENCOUNTER — TELEPHONE (OUTPATIENT)
Dept: BARIATRICS/WEIGHT MGMT | Facility: CLINIC | Age: 50
End: 2020-09-03

## 2020-09-03 NOTE — TELEPHONE ENCOUNTER
Called patient yesterday to check on how she was doing after discharge home from the hospital on 8/30. Patient stated she is feeling much better, able to eat solids and keep them down. She denied any nausea or vomiting. Patient did state she is still feeling a bit fatigued, we discussed the importance of pushing her fluid intake and rest when needed. She was instructed to call our office if she needed anything.     Patient was also made aware that nausea medication she was discharged with needed PA that was approved, but patient stated she did not feel she needed the nausea medication at this time.

## 2020-09-24 RX ORDER — OMEPRAZOLE 40 MG/1
40 CAPSULE, DELAYED RELEASE ORAL DAILY
Qty: 30 CAPSULE | Refills: 5 | Status: SHIPPED | OUTPATIENT
Start: 2020-09-24 | End: 2021-02-05

## 2020-11-05 ENCOUNTER — OFFICE VISIT (OUTPATIENT)
Dept: BARIATRICS/WEIGHT MGMT | Facility: CLINIC | Age: 50
End: 2020-11-05

## 2020-11-05 VITALS
HEART RATE: 71 BPM | DIASTOLIC BLOOD PRESSURE: 79 MMHG | TEMPERATURE: 96.8 F | SYSTOLIC BLOOD PRESSURE: 121 MMHG | WEIGHT: 207 LBS | HEIGHT: 61 IN | RESPIRATION RATE: 18 BRPM | BODY MASS INDEX: 39.08 KG/M2

## 2020-11-05 DIAGNOSIS — G47.33 OSA (OBSTRUCTIVE SLEEP APNEA): ICD-10-CM

## 2020-11-05 DIAGNOSIS — K21.9 GASTROESOPHAGEAL REFLUX DISEASE WITHOUT ESOPHAGITIS: ICD-10-CM

## 2020-11-05 DIAGNOSIS — Z98.84 S/P LAPAROSCOPIC SLEEVE GASTRECTOMY: ICD-10-CM

## 2020-11-05 DIAGNOSIS — E66.9 OBESITY, CLASS II, BMI 35-39.9: Primary | ICD-10-CM

## 2020-11-05 DIAGNOSIS — Z71.3 DIETARY COUNSELING: ICD-10-CM

## 2020-11-05 DIAGNOSIS — R53.82 CHRONIC FATIGUE: ICD-10-CM

## 2020-11-05 DIAGNOSIS — R73.03 PREDIABETES: ICD-10-CM

## 2020-11-05 PROBLEM — R11.2 N&V (NAUSEA AND VOMITING): Status: RESOLVED | Noted: 2020-08-27 | Resolved: 2020-11-05

## 2020-11-05 PROBLEM — R11.0 NAUSEA: Status: RESOLVED | Noted: 2020-08-05 | Resolved: 2020-11-05

## 2020-11-05 PROBLEM — E66.01 OBESITY, CLASS III, BMI 40-49.9 (MORBID OBESITY) (HCC): Status: RESOLVED | Noted: 2020-07-28 | Resolved: 2020-11-05

## 2020-11-05 PROBLEM — E66.812 OBESITY, CLASS II, BMI 35-39.9: Status: ACTIVE | Noted: 2020-11-05

## 2020-11-05 PROBLEM — E66.813 OBESITY, CLASS III, BMI 40-49.9 (MORBID OBESITY): Status: RESOLVED | Noted: 2020-07-28 | Resolved: 2020-11-05

## 2020-11-05 PROCEDURE — 99214 OFFICE O/P EST MOD 30 MIN: CPT | Performed by: SURGERY

## 2020-11-05 RX ORDER — MELOXICAM 7.5 MG/1
TABLET ORAL
COMMUNITY
Start: 2020-10-08 | End: 2020-11-05

## 2020-11-05 RX ORDER — IRON POLYSACCH/IRON HEME POLYP 28 MG
TABLET ORAL
COMMUNITY

## 2020-11-05 RX ORDER — TRIAMCINOLONE ACETONIDE 1 MG/G
CREAM TOPICAL
COMMUNITY
Start: 2020-10-28 | End: 2021-03-05

## 2020-11-05 NOTE — PROGRESS NOTES
[facility]AT[facility] SURGERY  [address] [address]  [phone]  [address] [address]  [phone]  Dept: [phone]  11/5/2020      Armin Viramontes.  [id number]  [id number]  1970  female      Chief Complaint   Patient presents with   • Follow-up     3 MONTH SLEEVE FOLLOW UP       BH Post-Op Bariatric Surgery:   Armin Viramontes is status post Laparoscopic Sleeve procedure, performed on 7/22/20     HPI:   Today's weight is 93.9 kg (207 lb) pounds, today's BMI is Body mass index is 39.13 kg/m².,@ has a  loss of 18 pounds since the last visit and@ weight loss since surgery is 58 pounds. The patient reports a decreased portion size and loss of appetite.      Armin Viramontes denies nausea vomiting fever chills chest pain shortness of air or abdominal pain and reports feels great without any complaints     Diet and Exercise: Diet history reviewed and discussed with the patient. Weight loss/gains to date discussed with the patient. The patient states they are eating 70 grams of protein per day. She reports eating 3 meals per day, a typical portion size of 1 cup, eating 2 snacks per day, drinking 5 or more 8-oz. glasses of water per day, no carbonated beverage consumption and exercising regularly.     Supplements: Over-the-counter multivitamin with iron.     Review of Systems   Constitutional: Positive for fatigue.   All other systems reviewed and are negative.      Patient Active Problem List   Diagnosis   • Seasonal allergies   • Hyperlipidemia   • GERD (gastroesophageal reflux disease)   • HPV in female   • Family history of ovarian cancer   • Lymphadenopathy   • Iron deficiency anemia due to chronic blood loss   • Insomnia   • Chronic fatigue   • JAZMYN (obstructive sleep apnea)   • Multiple joint pain   • Prediabetes   • SOB (shortness of breath)   • Leg swelling   • Chest pain, atypical   • Gastric polyps   • Dietary  counseling   • S/P laparoscopic sleeve gastrectomy   • Obesity, Class II, BMI 35-39.9       Past Medical History:   Diagnosis Date   • Anemia    • Arthritis     osteo   • Dermatitis    • GERD (gastroesophageal reflux disease)    • Hair loss    • History of obesity    • HPV in female 2012   • Hyperlipidemia    • Infectious mononucleosis    • Lymphadenopathy 02/2019    Right axilla   • Nausea    • JAZMYN (obstructive sleep apnea)     CPAP   • Patient exposure to body fluids    • Plantar fasciitis, bilateral    • Seasonal allergies        The following portions of the patient's history were reviewed and updated as appropriate: allergies, current medications, past family history, past medical history, past social history, past surgical history and problem list.    Vitals:    11/05/20 1354   BP: 121/79   Pulse: 71   Resp: 18   Temp: 96.8 °F (36 °C)       Physical Exam  Vitals signs reviewed.   HENT:      Head: Normocephalic and atraumatic.      Mouth/Throat:      Mouth: Mucous membranes are moist.      Pharynx: Oropharynx is clear.   Eyes:      General: No scleral icterus.     Extraocular Movements: Extraocular movements intact.      Conjunctiva/sclera: Conjunctivae normal.      Pupils: Pupils are equal, round, and reactive to light.   Neck:      Musculoskeletal: Normal range of motion and neck supple.      Thyroid: No thyromegaly.   Cardiovascular:      Rate and Rhythm: Normal rate.   Pulmonary:      Effort: Pulmonary effort is normal. No respiratory distress.      Breath sounds: Normal breath sounds. No stridor. No wheezing or rhonchi.   Abdominal:      General: Bowel sounds are normal. There is no distension.      Palpations: Abdomen is soft. There is no mass.      Tenderness: There is no abdominal tenderness. There is no right CVA tenderness, left CVA tenderness, guarding or rebound.      Hernia: No hernia is present.   Musculoskeletal: Normal range of motion.   Lymphadenopathy:      Cervical: No cervical adenopathy.    Skin:     General: Skin is warm and dry.      Findings: No erythema.   Neurological:      Mental Status: She is alert and oriented to person, place, and time.   Psychiatric:         Mood and Affect: Mood normal.         Behavior: Behavior normal.         Thought Content: Thought content normal.         Judgment: Judgment normal.         Assessment:   Post-op, the patient 3 months status post laparoscopic sleeve gastrectomy.  Patient doing very well without any complaints.  She is feeling much better.  She initially had some nausea and vomiting but doing great now.  No dysphagia with solids and no nausea or vomiting.  She went through her daily routine and is eating clean and is exercising.  Continue with her current regimen.  We did discuss Metamucil and also apple cider vinegar.  Increase strength training.  Also to meet with a dietitian at some point..     Encounter Diagnoses   Name Primary?   • Obesity, Class II, BMI 35-39.9 Yes   • S/P laparoscopic sleeve gastrectomy    • Prediabetes    • JAZMYN (obstructive sleep apnea)    • Dietary counseling    • Chronic fatigue    • Gastroesophageal reflux disease without esophagitis        Plan:     Encouraged patient to be sure to get plenty of lean protein per day through small frequent meals all with a protein source.   Activity restrictions: none.   Recommended patient be sure to get at least 70 grams of protein per day by eating small, frequent meals all with high lean protein choices. Be sure to limit/cut back on daily carbohydrate intake. Discussed with the patient the recommended amount of water per day to intake- half of body weight in ounces. Reviewed vitamin requirements. Be sure to do routine exercise, 150 minutes per week minimum, including both cardio and strength training.     Instructions / Recommendations: dietary counseling recommended, recommended a daily protein intake of  grams, vitamin supplement(s) recommended, recommended exercising at least 150  minutes per week, behavior modifications recommended and instructed to call the office for concerns, questions, or problems.     The patient was instructed to follow up in 3 months.     The patient was counseled regarding. Total time spent face to face was 25 minutes and 20 minutes was spent counseling.

## 2021-02-05 ENCOUNTER — OFFICE VISIT (OUTPATIENT)
Dept: BARIATRICS/WEIGHT MGMT | Facility: CLINIC | Age: 51
End: 2021-02-05

## 2021-02-05 ENCOUNTER — LAB (OUTPATIENT)
Dept: LAB | Facility: HOSPITAL | Age: 51
End: 2021-02-05

## 2021-02-05 VITALS
TEMPERATURE: 96.9 F | DIASTOLIC BLOOD PRESSURE: 70 MMHG | SYSTOLIC BLOOD PRESSURE: 114 MMHG | HEART RATE: 70 BPM | RESPIRATION RATE: 18 BRPM | WEIGHT: 179.5 LBS | HEIGHT: 61 IN | BODY MASS INDEX: 33.89 KG/M2

## 2021-02-05 DIAGNOSIS — D50.0 IRON DEFICIENCY ANEMIA DUE TO CHRONIC BLOOD LOSS: ICD-10-CM

## 2021-02-05 DIAGNOSIS — K21.9 GASTROESOPHAGEAL REFLUX DISEASE WITHOUT ESOPHAGITIS: ICD-10-CM

## 2021-02-05 DIAGNOSIS — E66.9 OBESITY, CLASS I, BMI 30-34.9: Primary | ICD-10-CM

## 2021-02-05 DIAGNOSIS — E78.5 HYPERLIPIDEMIA, UNSPECIFIED HYPERLIPIDEMIA TYPE: ICD-10-CM

## 2021-02-05 DIAGNOSIS — G47.33 OSA (OBSTRUCTIVE SLEEP APNEA): ICD-10-CM

## 2021-02-05 DIAGNOSIS — E66.9 OBESITY, CLASS I, BMI 30-34.9: ICD-10-CM

## 2021-02-05 DIAGNOSIS — Z98.84 S/P LAPAROSCOPIC SLEEVE GASTRECTOMY: ICD-10-CM

## 2021-02-05 DIAGNOSIS — R53.82 CHRONIC FATIGUE: ICD-10-CM

## 2021-02-05 PROBLEM — E66.812 OBESITY, CLASS II, BMI 35-39.9: Status: RESOLVED | Noted: 2020-11-05 | Resolved: 2021-02-05

## 2021-02-05 PROBLEM — E66.811 OBESITY, CLASS I, BMI 30-34.9: Status: ACTIVE | Noted: 2021-02-05

## 2021-02-05 LAB
25(OH)D3 SERPL-MCNC: 47.5 NG/ML (ref 30–100)
ALBUMIN SERPL-MCNC: 4.2 G/DL (ref 3.5–5.2)
ALBUMIN/GLOB SERPL: 1.4 G/DL
ALP SERPL-CCNC: 69 U/L (ref 39–117)
ALT SERPL W P-5'-P-CCNC: 11 U/L (ref 1–33)
ANION GAP SERPL CALCULATED.3IONS-SCNC: 9.5 MMOL/L (ref 5–15)
AST SERPL-CCNC: 11 U/L (ref 1–32)
BASOPHILS # BLD AUTO: 0.03 10*3/MM3 (ref 0–0.2)
BASOPHILS NFR BLD AUTO: 0.3 % (ref 0–1.5)
BILIRUB SERPL-MCNC: 0.2 MG/DL (ref 0–1.2)
BUN SERPL-MCNC: 15 MG/DL (ref 6–20)
BUN/CREAT SERPL: 23.1 (ref 7–25)
CALCIUM SPEC-SCNC: 9.5 MG/DL (ref 8.6–10.5)
CHLORIDE SERPL-SCNC: 105 MMOL/L (ref 98–107)
CO2 SERPL-SCNC: 25.5 MMOL/L (ref 22–29)
CREAT SERPL-MCNC: 0.65 MG/DL (ref 0.57–1)
DEPRECATED RDW RBC AUTO: 40.8 FL (ref 37–54)
EOSINOPHIL # BLD AUTO: 0.24 10*3/MM3 (ref 0–0.4)
EOSINOPHIL NFR BLD AUTO: 2.7 % (ref 0.3–6.2)
ERYTHROCYTE [DISTWIDTH] IN BLOOD BY AUTOMATED COUNT: 13.3 % (ref 12.3–15.4)
FERRITIN SERPL-MCNC: 137 NG/ML (ref 13–150)
FOLATE SERPL-MCNC: 13.1 NG/ML (ref 4.78–24.2)
GFR SERPL CREATININE-BSD FRML MDRD: 97 ML/MIN/1.73
GLOBULIN UR ELPH-MCNC: 2.9 GM/DL
GLUCOSE SERPL-MCNC: 89 MG/DL (ref 65–99)
HCT VFR BLD AUTO: 39.5 % (ref 34–46.6)
HGB BLD-MCNC: 12.5 G/DL (ref 12–15.9)
IMM GRANULOCYTES # BLD AUTO: 0.02 10*3/MM3 (ref 0–0.05)
IMM GRANULOCYTES NFR BLD AUTO: 0.2 % (ref 0–0.5)
IRON 24H UR-MRATE: 46 MCG/DL (ref 37–145)
LYMPHOCYTES # BLD AUTO: 2.54 10*3/MM3 (ref 0.7–3.1)
LYMPHOCYTES NFR BLD AUTO: 28.3 % (ref 19.6–45.3)
MCH RBC QN AUTO: 27.4 PG (ref 26.6–33)
MCHC RBC AUTO-ENTMCNC: 31.6 G/DL (ref 31.5–35.7)
MCV RBC AUTO: 86.4 FL (ref 79–97)
MONOCYTES # BLD AUTO: 0.46 10*3/MM3 (ref 0.1–0.9)
MONOCYTES NFR BLD AUTO: 5.1 % (ref 5–12)
NEUTROPHILS NFR BLD AUTO: 5.7 10*3/MM3 (ref 1.7–7)
NEUTROPHILS NFR BLD AUTO: 63.4 % (ref 42.7–76)
NRBC BLD AUTO-RTO: 0 /100 WBC (ref 0–0.2)
PLATELET # BLD AUTO: 283 10*3/MM3 (ref 140–450)
PMV BLD AUTO: 11.3 FL (ref 6–12)
POTASSIUM SERPL-SCNC: 3.7 MMOL/L (ref 3.5–5.2)
PREALB SERPL-MCNC: 20.6 MG/DL (ref 20–40)
PROT SERPL-MCNC: 7.1 G/DL (ref 6–8.5)
RBC # BLD AUTO: 4.57 10*6/MM3 (ref 3.77–5.28)
SODIUM SERPL-SCNC: 140 MMOL/L (ref 136–145)
WBC # BLD AUTO: 8.99 10*3/MM3 (ref 3.4–10.8)

## 2021-02-05 PROCEDURE — 84425 ASSAY OF VITAMIN B-1: CPT

## 2021-02-05 PROCEDURE — 82728 ASSAY OF FERRITIN: CPT

## 2021-02-05 PROCEDURE — 36415 COLL VENOUS BLD VENIPUNCTURE: CPT

## 2021-02-05 PROCEDURE — 83921 ORGANIC ACID SINGLE QUANT: CPT

## 2021-02-05 PROCEDURE — 85025 COMPLETE CBC W/AUTO DIFF WBC: CPT

## 2021-02-05 PROCEDURE — 83540 ASSAY OF IRON: CPT

## 2021-02-05 PROCEDURE — 82306 VITAMIN D 25 HYDROXY: CPT

## 2021-02-05 PROCEDURE — 84134 ASSAY OF PREALBUMIN: CPT

## 2021-02-05 PROCEDURE — 99215 OFFICE O/P EST HI 40 MIN: CPT | Performed by: NURSE PRACTITIONER

## 2021-02-05 PROCEDURE — 80053 COMPREHEN METABOLIC PANEL: CPT

## 2021-02-05 PROCEDURE — 82746 ASSAY OF FOLIC ACID SERUM: CPT

## 2021-02-05 RX ORDER — OMEPRAZOLE 10 MG/1
10 CAPSULE, DELAYED RELEASE ORAL DAILY
COMMUNITY
End: 2021-12-01

## 2021-02-05 NOTE — PROGRESS NOTES
[facility]IATRIC SURGERY  [address] [facility] [address]  [facility] [address]  [phone]  [address] [id number] [phone]  Dept: [phone]  2/5/2021      Armin Viramontes.  [id number]  [id number]  1970  female      Chief Complaint   Patient presents with   • Follow-up     6 month sleeve follow up       BH Post-Op Bariatric Surgery:   Armin Viramontes is status post Laparoscopic Sleeve procedure, performed on 7/22/20     HPI:   Today's weight is 81.4 kg (179 lb 8 oz) pounds, today's BMI is Body mass index is 33.89 kg/m².,@ has a  loss of 28 pounds since the last visit and@ weight loss since surgery is 86 pounds. The patient reports a decreased portion size and loss of appetite.      Armin Viramontes denies nausea, vomiting, reflux, heartburn.     Diet and Exercise: Diet history reviewed and discussed with the patient. Weight loss/gains to date discussed with the patient. The patient states they are eating 60 grams of protein per day. She reports eating 3 meals per day, a typical portion size of 1/2 cup, eating 3 snacks per day, drinking 5-6 or more 8-oz. glasses of water per day, no carbonated beverage consumption and exercising regularly- she has been jogging and exercise and weights 6-7 days per week.     She reports a new back midline back pain, soreness.     Supplements: megafood vitamin and biferra supplemental iron    Review of Systems   Constitutional: Positive for appetite change. Negative for fatigue and unexpected weight change.   HENT: Negative.    Eyes: Negative.    Respiratory: Negative.    Cardiovascular: Negative.  Negative for leg swelling.   Gastrointestinal: Negative for abdominal distention, abdominal pain, constipation, diarrhea, nausea and vomiting.   Genitourinary: Negative for difficulty urinating, frequency and urgency.   Musculoskeletal: Negative for back pain.   Skin: Negative.    Psychiatric/Behavioral:  Negative.    All other systems reviewed and are negative.      Patient Active Problem List   Diagnosis   • Seasonal allergies   • Hyperlipidemia   • GERD (gastroesophageal reflux disease)   • HPV in female   • Family history of ovarian cancer   • Lymphadenopathy   • Iron deficiency anemia due to chronic blood loss   • Insomnia   • Chronic fatigue   • JAZMYN (obstructive sleep apnea)   • Multiple joint pain   • Prediabetes   • SOB (shortness of breath)   • Leg swelling   • Chest pain, atypical   • Gastric polyps   • Dietary counseling   • S/P laparoscopic sleeve gastrectomy   • Obesity, Class I, BMI 30-34.9       Past Medical History:   Diagnosis Date   • Anemia    • Arthritis     osteo   • Dermatitis    • GERD (gastroesophageal reflux disease)    • Hair loss    • History of obesity    • HPV in female 2012   • Hyperlipidemia    • Infectious mononucleosis    • Lymphadenopathy 02/2019    Right axilla   • Nausea    • JAZMYN (obstructive sleep apnea)     CPAP   • Patient exposure to body fluids    • Plantar fasciitis, bilateral    • Seasonal allergies        The following portions of the patient's history were reviewed and updated as appropriate: allergies, current medications, past family history, past medical history, past social history, past surgical history and problem list.    Vitals:    02/05/21 1426   BP: 114/70   Pulse: 70   Resp: 18   Temp: 96.9 °F (36.1 °C)       Physical Exam  Vitals signs and nursing note reviewed.   Constitutional:       Appearance: She is well-developed.   Neck:      Thyroid: No thyromegaly.   Cardiovascular:      Rate and Rhythm: Normal rate and regular rhythm.      Heart sounds: Normal heart sounds.   Pulmonary:      Effort: Pulmonary effort is normal. No respiratory distress.      Breath sounds: Normal breath sounds. No wheezing.   Abdominal:      General: Bowel sounds are normal. There is no distension.      Palpations: Abdomen is soft.      Tenderness: There is no abdominal tenderness.  There is no guarding.      Hernia: No hernia is present.   Musculoskeletal:         General: No tenderness.   Skin:     General: Skin is warm and dry.      Findings: No erythema or rash.   Neurological:      Mental Status: She is alert.   Psychiatric:         Behavior: Behavior normal.         Assessment:   Post-op, the patient is doing well.     Encounter Diagnoses   Name Primary?   • Obesity, Class I, BMI 30-34.9 Yes   • JAZMYN (obstructive sleep apnea)    • S/P laparoscopic sleeve gastrectomy    • Gastroesophageal reflux disease without esophagitis    • Chronic fatigue        Plan:   We will check vitamin levels, she will continue getting her protein, keep up the good work with exercise. We did discuss caloric intake, intermittent fasting, muscle wasting, gut microbiome health, weight loss plateaus.   Encouraged patient to be sure to get plenty of lean protein per day through small frequent meals all with a protein source.   Activity restrictions: none.   Recommended patient be sure to get at least 70 grams of protein per day by eating small, frequent meals all with high lean protein choices. Be sure to limit/cut back on daily carbohydrate intake. Discussed with the patient the recommended amount of water per day to intake- half of body weight in ounces. Reviewed vitamin requirements. Be sure to do routine exercise, 150 minutes per week minimum, including both cardio and strength training.     Instructions / Recommendations: dietary counseling recommended, recommended a daily protein intake of  grams, vitamin supplement(s) recommended, recommended exercising at least 150 minutes per week, behavior modifications recommended and instructed to call the office for concerns, questions, or problems.     The patient was instructed to follow up in 3 months .      Total time spent face to face was 45 minutes and 35 minutes was spent counseling.

## 2021-02-11 LAB — VIT B1 BLD-SCNC: 155.2 NMOL/L (ref 66.5–200)

## 2021-02-12 LAB
Lab: NORMAL
METHYLMALONATE SERPL-SCNC: 87 NMOL/L (ref 0–378)

## 2021-03-05 ENCOUNTER — OFFICE VISIT (OUTPATIENT)
Dept: OBSTETRICS AND GYNECOLOGY | Age: 51
End: 2021-03-05

## 2021-03-05 VITALS
BODY MASS INDEX: 31.53 KG/M2 | SYSTOLIC BLOOD PRESSURE: 112 MMHG | WEIGHT: 167 LBS | HEIGHT: 61 IN | DIASTOLIC BLOOD PRESSURE: 70 MMHG

## 2021-03-05 DIAGNOSIS — Z13.220 SCREENING FOR LIPID DISORDERS: ICD-10-CM

## 2021-03-05 DIAGNOSIS — T19.2XXA RETAINED TAMPON, INITIAL ENCOUNTER: Primary | ICD-10-CM

## 2021-03-05 PROCEDURE — 99213 OFFICE O/P EST LOW 20 MIN: CPT | Performed by: NURSE PRACTITIONER

## 2021-03-05 NOTE — PROGRESS NOTES
"Subjective     Chief Complaint   Patient presents with   • Gynecologic Exam     Retained tampon       Armin Viramontes is a 50 y.o.  whose LMP is Patient's last menstrual period was 2021 (exact date).     Pt presents today with chief complaint of possible retained tampon   She states she put a smaller than normal size tampon in but doesn't ever remember taking it out  Thought maybe she felt it but wasn't sure  She denies any pelvic pain or vaginal discharge  She is also requesting to have fasting lipid panel drawn today  She had gastric bypass surgery done this past July and has lost 98 pounds  Pt of Dr. Rai      No Additional Complaints Reported    The following portions of the patient's history were reviewed and updated as appropriate:vital signs, allergies, current medications, past medical history, past social history, past surgical history and problem list      Review of Systems   A comprehensive review of systems was negative except for: Genitourinary: positive for possible retained tampon      Objective      /70   Ht 154.9 cm (61\")   Wt 75.8 kg (167 lb)   LMP 2021 (Exact Date)   BMI 31.55 kg/m²     Physical Exam    General:   alert and no distress   Heart: Not performed today   Lungs: Not performed today.   Breast: Not performed today   Neck: na   Abdomen: {Not performed today   CVA: Not performed today   Pelvis: External genitalia: normal general appearance  Urinary system: urethral meatus normal  Vaginal: normal mucosa without prolapse or lesions, normal without tenderness, induration or masses, normal rugae and No retained tampon seen on exam  Cervix: normal appearance  Adnexa: normal bimanual exam  Uterus: normal single, nontender   Extremities: Not performed today   Neurologic: negative   Psychiatric: Normal affect, judgement, and mood       Lab Review   Labs: No data reviewed     Imaging   No data reviewed    Assessment/Plan     ASSESSMENT  1. Retained tampon, initial " encounter    2. Screening for lipid disorders        PLAN  1.   Orders Placed This Encounter   Procedures   • Lipid Panel       2. Medications prescribed this encounter:      No orders of the defined types were placed in this encounter.      3. No retained tampon on exam. Will check fasting lipid panel today.    F/u for annual exam     Alysha Pearce, MANI  3/5/2021

## 2021-03-06 LAB
CHOLEST SERPL-MCNC: 258 MG/DL (ref 0–200)
HDLC SERPL-MCNC: 49 MG/DL (ref 40–60)
LDLC SERPL CALC-MCNC: 194 MG/DL (ref 0–100)
TRIGL SERPL-MCNC: 86 MG/DL (ref 0–150)
VLDLC SERPL CALC-MCNC: 15 MG/DL (ref 5–40)

## 2021-03-26 ENCOUNTER — OFFICE VISIT (OUTPATIENT)
Dept: OBSTETRICS AND GYNECOLOGY | Age: 51
End: 2021-03-26

## 2021-03-26 ENCOUNTER — PROCEDURE VISIT (OUTPATIENT)
Dept: OBSTETRICS AND GYNECOLOGY | Age: 51
End: 2021-03-26

## 2021-03-26 ENCOUNTER — APPOINTMENT (OUTPATIENT)
Dept: WOMENS IMAGING | Facility: HOSPITAL | Age: 51
End: 2021-03-26

## 2021-03-26 VITALS
DIASTOLIC BLOOD PRESSURE: 60 MMHG | HEIGHT: 61 IN | BODY MASS INDEX: 31.91 KG/M2 | WEIGHT: 169 LBS | SYSTOLIC BLOOD PRESSURE: 104 MMHG

## 2021-03-26 DIAGNOSIS — R73.03 PREDIABETES: ICD-10-CM

## 2021-03-26 DIAGNOSIS — Z11.51 SPECIAL SCREENING EXAMINATION FOR HUMAN PAPILLOMAVIRUS (HPV): ICD-10-CM

## 2021-03-26 DIAGNOSIS — Z12.31 VISIT FOR SCREENING MAMMOGRAM: Primary | ICD-10-CM

## 2021-03-26 DIAGNOSIS — Z01.419 ENCOUNTER FOR GYNECOLOGICAL EXAMINATION WITHOUT ABNORMAL FINDING: Primary | ICD-10-CM

## 2021-03-26 DIAGNOSIS — Z12.4 SCREENING FOR MALIGNANT NEOPLASM OF THE CERVIX: ICD-10-CM

## 2021-03-26 PROCEDURE — 99396 PREV VISIT EST AGE 40-64: CPT | Performed by: OBSTETRICS & GYNECOLOGY

## 2021-03-26 PROCEDURE — 77067 SCR MAMMO BI INCL CAD: CPT | Performed by: RADIOLOGY

## 2021-03-26 NOTE — PROGRESS NOTES
Subjective     Chief Complaint   Patient presents with   • Gynecologic Exam     AC, last pap 19 normal.        History of Present Illness    Armin Viramontes is a 50 y.o.  who presents for annual exam.  Patient has had gastric bypass since her last visit.  She has lost about 100 pounds!  Patient is very happy with the weight loss.  She is able to exercise by walking and running short distances.  She does complain of some pain between her shoulder blades that feels muscular she is trying to do some stretches.  Her cycles are not bothering her.  She did call her mother during this visit to get some more information about her maternal grandmother's cancer.  Her maternal grandmother had hysterectomy and removal of one ovary many years before she developed cancer that was thought to be from the remaining ovary.  Patient's mother also had breast cancer.  Patient and her relatives have never had genetic testing.  Her menses are regular every 28-30 days, lasting 2-3 days, dysmenorrhea mild, occurring first 1-2 days of flow   Obstetric History:  OB History        0    Para   0    Term   0       0    AB   0    Living   0       SAB   0    TAB   0    Ectopic   0    Molar        Multiple   0    Live Births                   Menstrual History:     Patient's last menstrual period was 2021 (exact date).         Current contraception: essure   History of abnormal Pap smear: yes - HPV  Received Gardasil immunization: no  Perform regular self breast exam : yes  Family history of uterine or ovarian - ovarian 49 MGM  Family History of colon cancer: no  Family history of breast cancer: yes - mother 61 stage 0     Mammogram: ordered.  Colonoscopy: recommended.  DEXA: not indicated.    Exercise: very active  Calcium/Vitamin D: adequate intake and uses supplements     The following portions of the patient's history were reviewed and updated as appropriate: allergies, current medications, past family history,  "past medical history, past social history, past surgical history and problem list.    Review of Systems    Review of Systems   Constitutional: Negative for fatigue.   Respiratory: Negative for shortness of breath.    Gastrointestinal: Negative for abdominal pain.   Genitourinary: Negative for dysuria.   Neurological: Negative for headaches.   Psychiatric/Behavioral: Negative for dysphoric mood.     Objective   Physical Exam    /60   Ht 154.9 cm (61\")   Wt 76.7 kg (169 lb)   LMP 03/02/2021 (Exact Date)   Breastfeeding No   BMI 31.93 kg/m²     General:   alert, appears stated age and cooperative   Neck: thyroid normal to palpation   Heart: regular rate and rhythm   Lungs: clear to auscultation bilaterally   Abdomen: soft, non-tender, without masses or organomegaly   Breast: inspection negative, no nipple discharge or bleeding, no masses or nodularity palpable   Vulva: normal, Bartholin's, Urethra, [place]'s normal   Vagina: normal mucosa, normal discharge   Cervix: no cervical motion tenderness and no lesions   Uterus: non-tender, normal shape and consistency   Adnexa: no mass, fullness, tenderness   Rectal: normal rectal, no masses     Assessment/Plan   Diagnoses and all orders for this visit:    1. Encounter for gynecological examination without abnormal finding (Primary)  -     Ambulatory Referral For Screening Colonoscopy  -     Hemoglobin A1c  -     TSH  -     IGP, Aptima HPV, Rfx 16 / 18,45    2. Prediabetes  -     Ambulatory Referral For Screening Colonoscopy  -     Hemoglobin A1c  -     TSH    3. Screening for malignant neoplasm of the cervix  -     IGP, Aptima HPV, Rfx 16 / 18,45    4. Special screening examination for human papillomavirus (HPV)  -     IGP, Aptima HPV, Rfx 16 / 18,45    Family history of ovarian and breast cancer-send genetic testing off today.    Patient has a history of prediabetes so we will check hemoglobin A1c she has had a significant weight loss so hopefully this will be " normal.  We will also check thyroid.  We discussed stretches and yoga to do for muscular tension in her upper back.    Colonoscopy referral was also placed.    All questions answered.  Breast self exam technique reviewed and patient encouraged to perform self-exam monthly.  Discussed healthy lifestyle modifications.  Recommended 30 minutes of aerobic exercise five times per week.  Discussed calcium needs to prevent osteoporosis.

## 2021-03-27 LAB
HBA1C MFR BLD: 5.4 % (ref 4.8–5.6)
TSH SERPL DL<=0.005 MIU/L-ACNC: 1.76 UIU/ML (ref 0.27–4.2)

## 2021-03-29 ENCOUNTER — TELEPHONE (OUTPATIENT)
Dept: OBSTETRICS AND GYNECOLOGY | Age: 51
End: 2021-03-29

## 2021-03-29 NOTE — TELEPHONE ENCOUNTER
----- Message from Curtis Rai MD sent at 3/29/2021  9:07 AM EDT -----  Please notify that hemoglobin A1c and thyroid testing look great ,they are both in normal limits.

## 2021-03-30 LAB
CYTOLOGIST CVX/VAG CYTO: NORMAL
CYTOLOGY CVX/VAG DOC CYTO: NORMAL
CYTOLOGY CVX/VAG DOC THIN PREP: NORMAL
DX ICD CODE: NORMAL
HIV 1 & 2 AB SER-IMP: NORMAL
HPV I/H RISK 4 DNA CVX QL PROBE+SIG AMP: NEGATIVE
OTHER STN SPEC: NORMAL
STAT OF ADQ CVX/VAG CYTO-IMP: NORMAL

## 2021-03-31 ENCOUNTER — TELEPHONE (OUTPATIENT)
Dept: OBSTETRICS AND GYNECOLOGY | Age: 51
End: 2021-03-31

## 2021-03-31 ENCOUNTER — BULK ORDERING (OUTPATIENT)
Dept: CASE MANAGEMENT | Facility: OTHER | Age: 51
End: 2021-03-31

## 2021-03-31 DIAGNOSIS — Z23 IMMUNIZATION DUE: ICD-10-CM

## 2021-11-16 DIAGNOSIS — Z98.84 S/P LAPAROSCOPIC SLEEVE GASTRECTOMY: ICD-10-CM

## 2021-11-16 DIAGNOSIS — D50.0 IRON DEFICIENCY ANEMIA DUE TO CHRONIC BLOOD LOSS: ICD-10-CM

## 2021-11-16 DIAGNOSIS — E66.9 OBESITY, CLASS I, BMI 30-34.9: Primary | ICD-10-CM

## 2021-11-16 DIAGNOSIS — R73.03 PREDIABETES: ICD-10-CM

## 2021-11-16 DIAGNOSIS — E78.5 HYPERLIPIDEMIA, UNSPECIFIED HYPERLIPIDEMIA TYPE: ICD-10-CM

## 2021-12-01 ENCOUNTER — OFFICE VISIT (OUTPATIENT)
Dept: BARIATRICS/WEIGHT MGMT | Facility: CLINIC | Age: 51
End: 2021-12-01

## 2021-12-01 ENCOUNTER — LAB (OUTPATIENT)
Dept: LAB | Facility: HOSPITAL | Age: 51
End: 2021-12-01

## 2021-12-01 VITALS
BODY MASS INDEX: 28.7 KG/M2 | HEART RATE: 59 BPM | TEMPERATURE: 97.1 F | DIASTOLIC BLOOD PRESSURE: 73 MMHG | HEIGHT: 61 IN | SYSTOLIC BLOOD PRESSURE: 116 MMHG | RESPIRATION RATE: 18 BRPM | WEIGHT: 152 LBS

## 2021-12-01 DIAGNOSIS — D50.0 IRON DEFICIENCY ANEMIA DUE TO CHRONIC BLOOD LOSS: ICD-10-CM

## 2021-12-01 DIAGNOSIS — R73.03 PREDIABETES: ICD-10-CM

## 2021-12-01 DIAGNOSIS — Z71.3 DIETARY COUNSELING: ICD-10-CM

## 2021-12-01 DIAGNOSIS — E66.3 OVERWEIGHT (BMI 25.0-29.9): Primary | ICD-10-CM

## 2021-12-01 DIAGNOSIS — E66.9 OBESITY, CLASS I, BMI 30-34.9: ICD-10-CM

## 2021-12-01 DIAGNOSIS — E78.5 HYPERLIPIDEMIA, UNSPECIFIED HYPERLIPIDEMIA TYPE: ICD-10-CM

## 2021-12-01 DIAGNOSIS — E66.3 OVERWEIGHT (BMI 25.0-29.9): ICD-10-CM

## 2021-12-01 DIAGNOSIS — K21.9 GASTROESOPHAGEAL REFLUX DISEASE WITHOUT ESOPHAGITIS: ICD-10-CM

## 2021-12-01 DIAGNOSIS — R53.82 CHRONIC FATIGUE: ICD-10-CM

## 2021-12-01 DIAGNOSIS — Z98.84 S/P LAPAROSCOPIC SLEEVE GASTRECTOMY: ICD-10-CM

## 2021-12-01 DIAGNOSIS — G47.33 OSA (OBSTRUCTIVE SLEEP APNEA): ICD-10-CM

## 2021-12-01 PROBLEM — E66.811 OBESITY, CLASS I, BMI 30-34.9: Status: RESOLVED | Noted: 2021-02-05 | Resolved: 2021-12-01

## 2021-12-01 LAB
25(OH)D3 SERPL-MCNC: 41.7 NG/ML
ALBUMIN SERPL-MCNC: 4.4 G/DL (ref 3.5–5.2)
ALBUMIN/GLOB SERPL: 1.6 G/DL
ALP SERPL-CCNC: 54 U/L (ref 39–117)
ALT SERPL W P-5'-P-CCNC: 12 U/L (ref 1–33)
ANION GAP SERPL CALCULATED.3IONS-SCNC: 10.8 MMOL/L (ref 5–15)
AST SERPL-CCNC: 11 U/L (ref 1–32)
BASOPHILS # BLD AUTO: 0.04 10*3/MM3 (ref 0–0.2)
BASOPHILS NFR BLD AUTO: 0.6 % (ref 0–1.5)
BILIRUB SERPL-MCNC: 0.4 MG/DL (ref 0–1.2)
BUN SERPL-MCNC: 16 MG/DL (ref 6–20)
BUN/CREAT SERPL: 25.8 (ref 7–25)
CALCIUM SPEC-SCNC: 9.4 MG/DL (ref 8.6–10.5)
CHLORIDE SERPL-SCNC: 102 MMOL/L (ref 98–107)
CHOLEST SERPL-MCNC: 283 MG/DL (ref 0–200)
CO2 SERPL-SCNC: 27.2 MMOL/L (ref 22–29)
CREAT SERPL-MCNC: 0.62 MG/DL (ref 0.57–1)
DEPRECATED RDW RBC AUTO: 39.6 FL (ref 37–54)
EOSINOPHIL # BLD AUTO: 0.2 10*3/MM3 (ref 0–0.4)
EOSINOPHIL NFR BLD AUTO: 3.2 % (ref 0.3–6.2)
ERYTHROCYTE [DISTWIDTH] IN BLOOD BY AUTOMATED COUNT: 12.5 % (ref 12.3–15.4)
FERRITIN SERPL-MCNC: 106 NG/ML (ref 13–150)
FOLATE SERPL-MCNC: 11.9 NG/ML (ref 4.78–24.2)
GFR SERPL CREATININE-BSD FRML MDRD: 102 ML/MIN/1.73
GLOBULIN UR ELPH-MCNC: 2.7 GM/DL
GLUCOSE SERPL-MCNC: 83 MG/DL (ref 65–99)
HBA1C MFR BLD: 5.52 % (ref 4.8–5.6)
HCT VFR BLD AUTO: 39.7 % (ref 34–46.6)
HDLC SERPL-MCNC: 76 MG/DL (ref 40–60)
HGB BLD-MCNC: 13.1 G/DL (ref 12–15.9)
IMM GRANULOCYTES # BLD AUTO: 0.01 10*3/MM3 (ref 0–0.05)
IMM GRANULOCYTES NFR BLD AUTO: 0.2 % (ref 0–0.5)
IRON 24H UR-MRATE: 89 MCG/DL (ref 37–145)
LDLC SERPL CALC-MCNC: 193 MG/DL (ref 0–100)
LDLC/HDLC SERPL: 2.5 {RATIO}
LYMPHOCYTES # BLD AUTO: 2.27 10*3/MM3 (ref 0.7–3.1)
LYMPHOCYTES NFR BLD AUTO: 36.9 % (ref 19.6–45.3)
MCH RBC QN AUTO: 29 PG (ref 26.6–33)
MCHC RBC AUTO-ENTMCNC: 33 G/DL (ref 31.5–35.7)
MCV RBC AUTO: 88 FL (ref 79–97)
MONOCYTES # BLD AUTO: 0.34 10*3/MM3 (ref 0.1–0.9)
MONOCYTES NFR BLD AUTO: 5.5 % (ref 5–12)
NEUTROPHILS NFR BLD AUTO: 3.3 10*3/MM3 (ref 1.7–7)
NEUTROPHILS NFR BLD AUTO: 53.6 % (ref 42.7–76)
NRBC BLD AUTO-RTO: 0 /100 WBC (ref 0–0.2)
PLATELET # BLD AUTO: 251 10*3/MM3 (ref 140–450)
PMV BLD AUTO: 10.7 FL (ref 6–12)
POTASSIUM SERPL-SCNC: 3.6 MMOL/L (ref 3.5–5.2)
PREALB SERPL-MCNC: 23 MG/DL (ref 20–40)
PROT SERPL-MCNC: 7.1 G/DL (ref 6–8.5)
PTH-INTACT SERPL-MCNC: 44.3 PG/ML (ref 15–65)
RBC # BLD AUTO: 4.51 10*6/MM3 (ref 3.77–5.28)
SODIUM SERPL-SCNC: 140 MMOL/L (ref 136–145)
TRIGL SERPL-MCNC: 85 MG/DL (ref 0–150)
VLDLC SERPL-MCNC: 14 MG/DL (ref 5–40)
WBC NRBC COR # BLD: 6.16 10*3/MM3 (ref 3.4–10.8)

## 2021-12-01 PROCEDURE — 83540 ASSAY OF IRON: CPT

## 2021-12-01 PROCEDURE — 82746 ASSAY OF FOLIC ACID SERUM: CPT

## 2021-12-01 PROCEDURE — 36415 COLL VENOUS BLD VENIPUNCTURE: CPT

## 2021-12-01 PROCEDURE — 83921 ORGANIC ACID SINGLE QUANT: CPT

## 2021-12-01 PROCEDURE — 84134 ASSAY OF PREALBUMIN: CPT

## 2021-12-01 PROCEDURE — 82728 ASSAY OF FERRITIN: CPT

## 2021-12-01 PROCEDURE — 82306 VITAMIN D 25 HYDROXY: CPT

## 2021-12-01 PROCEDURE — 99215 OFFICE O/P EST HI 40 MIN: CPT | Performed by: NURSE PRACTITIONER

## 2021-12-01 PROCEDURE — 80053 COMPREHEN METABOLIC PANEL: CPT

## 2021-12-01 PROCEDURE — 83036 HEMOGLOBIN GLYCOSYLATED A1C: CPT

## 2021-12-01 PROCEDURE — 84425 ASSAY OF VITAMIN B-1: CPT

## 2021-12-01 PROCEDURE — 80061 LIPID PANEL: CPT | Performed by: NURSE PRACTITIONER

## 2021-12-01 PROCEDURE — 85025 COMPLETE CBC W/AUTO DIFF WBC: CPT

## 2021-12-01 PROCEDURE — 83970 ASSAY OF PARATHORMONE: CPT

## 2021-12-01 NOTE — PROGRESS NOTES
[facility] BARIATRIC SURGERY  [address] [id number] [phone]  [address] [id number] [phone]  Dept: [phone]  12/1/2021      Armin Viramontes.  [id number]  [id number]  1970  female      Chief Complaint   Patient presents with   • Follow-up     SLEEVE FOLLOW UP       BH Post-Op Bariatric Surgery:   Armin Viramontes is status post Laparoscopic Sleeve procedure, performed on 7/22/20     HPI:   Today's weight is 68.9 kg (152 lb) pounds, today's BMI is Body mass index is 28.7 kg/m²., a  loss of 27 pounds since the last visit and weight loss since surgery is 113 pounds. The patient reports a decreased portion size and loss of appetite.      Armin Viramontes denies n/v/d/regurg/reflux/pain and reports alopecia.     Diet and Exercise: Diet history reviewed and discussed with the patient. Weight loss/gains to date discussed with the patient. The patient states they are eating 70+ grams of protein per day. She reports eating 3 meals per day, a typical portion size of 1 cup, eating 3 snacks per day, drinking 5+ or more 8-oz. glasses of water per day, no carbonated beverage consumption and exercising regularly- running and weights.     Supplements: whole foods MVI, d, iron, collagen, biotin.     Review of Systems   Constitutional:        Alopecia   All other systems reviewed and are negative.      Patient Active Problem List   Diagnosis   • Seasonal allergies   • Hyperlipidemia   • GERD (gastroesophageal reflux disease)   • HPV in female   • Family history of ovarian cancer   • Lymphadenopathy   • Iron deficiency anemia due to chronic blood loss   • Insomnia   • Chronic fatigue   • JAZMYN (obstructive sleep apnea)   • Multiple joint pain   • Prediabetes   • SOB (shortness of breath)   • Leg swelling   • Chest pain, atypical   • Gastric polyps   • Dietary counseling   • S/P laparoscopic sleeve gastrectomy   • Overweight  (BMI 25.0-29.9)       Past Medical History:   Diagnosis Date   • Anemia    • Arthritis     osteo   • Dermatitis    • GERD (gastroesophageal reflux disease)    • Hair loss    • History of obesity    • HPV in female 2012   • Hyperlipidemia    • Infectious mononucleosis    • Lymphadenopathy 02/2019    Right axilla   • Nausea    • Obesity, Class I, BMI 30-34.9 2/5/2021   • JAZMYN (obstructive sleep apnea)     CPAP   • Patient exposure to body fluids    • Plantar fasciitis, bilateral    • Seasonal allergies        The following portions of the patient's history were reviewed and updated as appropriate: allergies, current medications, past family history, past medical history, past social history, past surgical history and problem list.    Vitals:    12/01/21 0843   BP: 116/73   Pulse: 59   Resp: 18   Temp: 97.1 °F (36.2 °C)       Physical Exam  Vitals reviewed.   Constitutional:       Appearance: Normal appearance. She is well-developed. She is obese.   HENT:      Head: Normocephalic and atraumatic.   Cardiovascular:      Rate and Rhythm: Normal rate.   Pulmonary:      Effort: Pulmonary effort is normal.      Breath sounds: Normal breath sounds.   Abdominal:      General: Bowel sounds are normal. There is no distension.      Palpations: Abdomen is soft.      Tenderness: There is no abdominal tenderness.   Musculoskeletal:         General: Normal range of motion.   Skin:     General: Skin is warm and dry.   Neurological:      Mental Status: She is alert and oriented to person, place, and time.   Psychiatric:         Behavior: Behavior normal.         Thought Content: Thought content normal.         Judgment: Judgment normal.         Assessment:   Post-op, the patient is doing great.     Encounter Diagnoses   Name Primary?   • Overweight (BMI 25.0-29.9) Yes   • S/P laparoscopic sleeve gastrectomy    • Dietary counseling    • Prediabetes    • Hyperlipidemia, unspecified hyperlipidemia type    • Iron deficiency anemia due to  chronic blood loss    • Chronic fatigue        Plan:     Encouraged patient to be sure to get plenty of lean protein per day through small frequent meals all with a protein source. Doing well with her dietary choices. Discussed snacking options and avoiding grazing. Be sure to get plenty of water. Will check labs to see if OTC MVI is sufficient. Recommended strength training.  Activity restrictions: none.   Recommended patient be sure to get at least 70 grams of protein per day by eating small, frequent meals all with high lean protein choices. Be sure to limit/cut back on daily carbohydrate intake. Discussed with the patient the recommended amount of water per day to intake- half of body weight in ounces. Reviewed vitamin requirements. Be sure to do routine exercise, 150 minutes per week minimum, including both cardio and strength training.     Instructions / Recommendations: dietary counseling recommended, recommended a daily protein intake of  grams, vitamin supplement(s) recommended, recommended exercising at least 150 minutes per week, behavior modifications recommended and instructed to call the office for concerns, questions, or problems.     The patient was instructed to follow up in 6 months.     The patient was counseled regarding the above procedure. Total time spent on this encounter was 40+ minutes including reviewing previous notes, lab results and face to face time spent with the patient.  The majority of time was spent counseling the patient regarding diet and exercise as well as reviewing their medications and their compliance with the procedure.

## 2021-12-06 LAB — VIT B1 BLD-SCNC: 133.3 NMOL/L (ref 66.5–200)

## 2021-12-08 LAB
Lab: NORMAL
METHYLMALONATE SERPL-SCNC: 141 NMOL/L (ref 0–378)

## 2022-12-22 ENCOUNTER — PROCEDURE VISIT (OUTPATIENT)
Dept: OBSTETRICS AND GYNECOLOGY | Age: 52
End: 2022-12-22

## 2022-12-22 ENCOUNTER — OFFICE VISIT (OUTPATIENT)
Dept: OBSTETRICS AND GYNECOLOGY | Age: 52
End: 2022-12-22

## 2022-12-22 ENCOUNTER — APPOINTMENT (OUTPATIENT)
Dept: WOMENS IMAGING | Facility: HOSPITAL | Age: 52
End: 2022-12-22
Payer: COMMERCIAL

## 2022-12-22 VITALS
BODY MASS INDEX: 30.02 KG/M2 | WEIGHT: 159 LBS | SYSTOLIC BLOOD PRESSURE: 110 MMHG | HEIGHT: 61 IN | DIASTOLIC BLOOD PRESSURE: 72 MMHG

## 2022-12-22 DIAGNOSIS — Z12.31 VISIT FOR SCREENING MAMMOGRAM: Primary | ICD-10-CM

## 2022-12-22 DIAGNOSIS — Z12.11 SCREENING FOR COLON CANCER: ICD-10-CM

## 2022-12-22 DIAGNOSIS — Z12.4 SCREENING FOR CERVICAL CANCER: ICD-10-CM

## 2022-12-22 DIAGNOSIS — Z80.3 FAMILY HISTORY OF BREAST CANCER: ICD-10-CM

## 2022-12-22 DIAGNOSIS — Z01.419 WELL WOMAN EXAM WITH ROUTINE GYNECOLOGICAL EXAM: Primary | ICD-10-CM

## 2022-12-22 DIAGNOSIS — Z80.41 FAMILY HISTORY OF OVARIAN CANCER: ICD-10-CM

## 2022-12-22 PROCEDURE — 77067 SCR MAMMO BI INCL CAD: CPT | Performed by: OBSTETRICS & GYNECOLOGY

## 2022-12-22 PROCEDURE — 99396 PREV VISIT EST AGE 40-64: CPT | Performed by: NURSE PRACTITIONER

## 2022-12-22 PROCEDURE — 77067 SCR MAMMO BI INCL CAD: CPT | Performed by: RADIOLOGY

## 2022-12-22 PROCEDURE — 77063 BREAST TOMOSYNTHESIS BI: CPT | Performed by: OBSTETRICS & GYNECOLOGY

## 2022-12-22 PROCEDURE — 77063 BREAST TOMOSYNTHESIS BI: CPT | Performed by: RADIOLOGY

## 2022-12-22 RX ORDER — UBIDECARENONE 75 MG
50 CAPSULE ORAL DAILY
COMMUNITY

## 2022-12-22 NOTE — PROGRESS NOTES
Subjective     Chief Complaint   Patient presents with   • Gynecologic Exam     AE, last pap 2021 neg/hpv neg, mg 2022  Cc; no problems       History of Present Illness    Armin Viramontes is a 52 y.o.  who presents for annual exam.    Doing well  Mammogram today  Family hx of ovarian and breast cancer - pt has had negative myrisk  Colonoscopy - never had, will order  Menses every 2-3 months, no HF or NS  No other GYN concerns or complaints    Obstetric History:  OB History        0    Para   0    Term   0       0    AB   0    Living   0       SAB   0    IAB   0    Ectopic   0    Molar        Multiple   0    Live Births                   Menstrual History:     Patient's last menstrual period was 2022.         Current contraception: essure  History of abnormal Pap smear: yes - hpv  Received Gardasil immunization: no  Perform regular self breast exam: yes - .  Family history of uterine or ovarian cancer: yes - MGM  Family History of colon cancer: no  Family history of breast cancer: yes - mother    Mammogram: done today.  Colonoscopy: recommended.  DEXA: not indicated.    Exercise: moderately active - walks  Calcium/Vitamin D: uses supplements    The following portions of the patient's history were reviewed and updated as appropriate: allergies, current medications, past family history, past medical history, past social history, past surgical history and problem list.    Review of Systems   Constitutional: Negative.    Respiratory: Negative.    Cardiovascular: Negative.    Gastrointestinal: Negative.    Genitourinary: Negative.    Skin: Negative.    Psychiatric/Behavioral: Negative.            Objective   Physical Exam  Constitutional:       General: She is awake.      Appearance: Normal appearance. She is well-developed.   HENT:      Head: Normocephalic and atraumatic.      Nose: Nose normal.   Neck:      Thyroid: No thyroid mass, thyromegaly or thyroid tenderness.    Cardiovascular:      Rate and Rhythm: Normal rate and regular rhythm.      Pulses: Normal pulses.      Heart sounds: Normal heart sounds.   Pulmonary:      Effort: Pulmonary effort is normal.      Breath sounds: Normal breath sounds.   Chest:   Breasts:     Breasts are symmetrical.      Right: Normal. No swelling, bleeding, inverted nipple, mass, nipple discharge, skin change or tenderness.      Left: Normal. No swelling, bleeding, inverted nipple, mass, nipple discharge, skin change or tenderness.   Abdominal:      General: Abdomen is flat. Bowel sounds are normal.      Palpations: Abdomen is soft.      Tenderness: There is no abdominal tenderness.   Genitourinary:     General: Normal vulva.      Labia:         Right: No rash, tenderness, lesion or injury.         Left: No rash, tenderness, lesion or injury.       Urethra: No prolapse, urethral pain, urethral swelling or urethral lesion.      Vagina: Normal. No signs of injury. No vaginal discharge, erythema, tenderness, bleeding, lesions or prolapsed vaginal walls.      Cervix: No discharge, friability, lesion, erythema or cervical bleeding.      Uterus: Normal. Not enlarged, not tender and no uterine prolapse.       Adnexa: Right adnexa normal and left adnexa normal.        Right: No mass, tenderness or fullness.          Left: No mass, tenderness or fullness.        Rectum: Normal. No mass.   Musculoskeletal:      Cervical back: Normal range of motion and neck supple.   Lymphadenopathy:      Upper Body:      Right upper body: No supraclavicular adenopathy.      Left upper body: No supraclavicular adenopathy.   Skin:     General: Skin is warm and dry.   Neurological:      General: No focal deficit present.      Mental Status: She is alert and oriented to person, place, and time.   Psychiatric:         Mood and Affect: Mood normal.         Behavior: Behavior normal. Behavior is cooperative.         Thought Content: Thought content normal.         Judgment:  "Judgment normal.         /72   Ht 154.9 cm (61\")   Wt 72.1 kg (159 lb)   LMP 09/30/2022   BMI 30.04 kg/m²     Assessment & Plan   Diagnoses and all orders for this visit:    1. Well woman exam with routine gynecological exam (Primary)    2. Family history of ovarian cancer    3. Family history of breast cancer    4. Screening for cervical cancer  -     IGP, Apt HPV,rfx 16 / 18,45    5. Screening for colon cancer  -     Ambulatory Referral For Screening Colonoscopy        All questions answered.  Breast self exam technique reviewed and patient encouraged to perform self-exam monthly.  Discussed healthy lifestyle modifications.  Recommended 30 minutes of aerobic exercise five times per week.  Discussed calcium needs to prevent osteoporosis.    -F/u 1 year               "

## 2023-04-12 ENCOUNTER — TELEPHONE (OUTPATIENT)
Dept: ONCOLOGY | Facility: CLINIC | Age: 53
End: 2023-04-12
Payer: COMMERCIAL

## 2023-04-12 NOTE — TELEPHONE ENCOUNTER
Called pt with her lab results back in 2019. Her dad was just diagnosed with iron deficiency and she wanted to know what her levels were before she received her iron infusion. Informed pt of lab results. She v/u.

## 2023-04-12 NOTE — TELEPHONE ENCOUNTER
Caller: Armin Viramontes    Relationship: Self    Best call back number: [phone]    What is the best time to reach you: ASAP    Who are you requesting to speak with (clinical staff, provider,  specific staff member): CLINICAL      What was the call regarding: PT HAD INFUSION AND IRON TEST WITH US A FEW YEARS AGO, SHE BELIEVES IN 2019, AND SHE NEEDS TO FIND OUT IF WE CAN LET HER KNOW WHAT HER IRON LEVEL WAS AT THAT TIME.    Do you require a callback: YES, PLEASE CALL PT BACK TO ADVISE.  OR IT IS ALSO OK TO LEAVE THE IRON LEVEL NUMBER ON HER VM.

## 2024-01-04 ENCOUNTER — HOSPITAL ENCOUNTER (OUTPATIENT)
Facility: HOSPITAL | Age: 54
Discharge: HOME | End: 2024-01-04
Admitting: OBSTETRICS & GYNECOLOGY
Payer: COMMERCIAL

## 2024-01-04 ENCOUNTER — OFFICE VISIT (OUTPATIENT)
Dept: OBSTETRICS AND GYNECOLOGY | Age: 54
End: 2024-01-04
Payer: COMMERCIAL

## 2024-01-04 VITALS
HEIGHT: 61 IN | DIASTOLIC BLOOD PRESSURE: 68 MMHG | WEIGHT: 166 LBS | SYSTOLIC BLOOD PRESSURE: 112 MMHG | BODY MASS INDEX: 31.34 KG/M2

## 2024-01-04 DIAGNOSIS — Z12.31 ENCOUNTER FOR SCREENING MAMMOGRAM FOR MALIGNANT NEOPLASM OF BREAST: ICD-10-CM

## 2024-01-04 DIAGNOSIS — Z01.419 WELL WOMAN EXAM WITH ROUTINE GYNECOLOGICAL EXAM: Primary | ICD-10-CM

## 2024-01-04 DIAGNOSIS — Z80.3 FAMILY HISTORY OF BREAST CANCER: ICD-10-CM

## 2024-01-04 DIAGNOSIS — N95.1 MENOPAUSAL SYMPTOMS: ICD-10-CM

## 2024-01-04 DIAGNOSIS — Z12.4 SCREENING FOR CERVICAL CANCER: ICD-10-CM

## 2024-01-04 DIAGNOSIS — Z12.31 VISIT FOR SCREENING MAMMOGRAM: ICD-10-CM

## 2024-01-04 DIAGNOSIS — Z98.84 S/P LAPAROSCOPIC SLEEVE GASTRECTOMY: ICD-10-CM

## 2024-01-04 DIAGNOSIS — Z80.41 FAMILY HISTORY OF OVARIAN CANCER: ICD-10-CM

## 2024-01-04 PROCEDURE — 77063 BREAST TOMOSYNTHESIS BI: CPT

## 2024-01-04 PROCEDURE — 77067 SCR MAMMO BI INCL CAD: CPT

## 2024-01-04 RX ORDER — FERROUS GLUCONATE 324(38)MG
324 TABLET ORAL
COMMUNITY

## 2024-01-04 NOTE — PROGRESS NOTES
Subjective     Chief Complaint   Patient presents with    Gynecologic Exam     AE, last pap 2022 neg/hpv neg, mg 2024, no csy  CC:  no problems       History of Present Illness    Armin Viramontes is a 53 y.o.  who presents for annual exam.    Doing well  Mammogram today  Colonoscopy - didn't do it but planning on it this year  Family hx of ovarian and breast cancer - pt has had negative myrisk   Still having menses every 2-3 months, occl HF's  Currently on menses  Wants hormone levels checked  No other GYN concerns or complaints      Obstetric History:  OB History          0    Para   0    Term   0       0    AB   0    Living   0         SAB   0    IAB   0    Ectopic   0    Molar        Multiple   0    Live Births                   Menstrual History:     Patient's last menstrual period was 2024.         Current contraception:  Essure  History of abnormal Pap smear: yes - hpv  Received Gardasil immunization: no  Perform regular self breast exam: yes - .  Family history of uterine or ovarian cancer: yes - see hx  Family History of colon cancer: no  Family history of breast cancer: yes - see hx    Mammogram: done today.  Colonoscopy: recommended.  DEXA: not indicated.    Exercise: moderately active - run/walk, weights   Calcium/Vitamin D: uses supplements    The following portions of the patient's history were reviewed and updated as appropriate: allergies, current medications, past family history, past medical history, past social history, past surgical history, and problem list.    Review of Systems   Constitutional: Negative.    Respiratory: Negative.     Cardiovascular: Negative.    Gastrointestinal: Negative.    Genitourinary: Negative.    Skin: Negative.    Psychiatric/Behavioral: Negative.             Objective   Physical Exam  Constitutional:       General: She is awake.      Appearance: Normal appearance. She is well-developed.   HENT:      Head: Normocephalic and  atraumatic.      Nose: Nose normal.   Neck:      Thyroid: No thyroid mass, thyromegaly or thyroid tenderness.   Cardiovascular:      Rate and Rhythm: Normal rate and regular rhythm.      Pulses: Normal pulses.      Heart sounds: Normal heart sounds.   Pulmonary:      Effort: Pulmonary effort is normal.      Breath sounds: Normal breath sounds.   Chest:   Breasts:     Breasts are symmetrical.      Right: Normal. No swelling, bleeding, inverted nipple, mass, nipple discharge, skin change or tenderness.      Left: Normal. No swelling, bleeding, inverted nipple, mass, nipple discharge, skin change or tenderness.   Abdominal:      General: Abdomen is flat. Bowel sounds are normal.      Palpations: Abdomen is soft.      Tenderness: There is no abdominal tenderness.   Genitourinary:     General: Normal vulva.      Labia:         Right: No rash, tenderness, lesion or injury.         Left: No rash, tenderness, lesion or injury.       Urethra: No prolapse, urethral pain, urethral swelling or urethral lesion.      Vagina: Normal. No signs of injury. No vaginal discharge, erythema, tenderness, bleeding, lesions or prolapsed vaginal walls.      Cervix: Normal. No discharge, friability, lesion, erythema or cervical bleeding.      Uterus: Normal. Not enlarged, not tender and no uterine prolapse.       Adnexa: Right adnexa normal and left adnexa normal.        Right: No mass, tenderness or fullness.          Left: No mass, tenderness or fullness.        Rectum: Normal. No mass.   Musculoskeletal:      Cervical back: Normal range of motion and neck supple.   Lymphadenopathy:      Upper Body:      Right upper body: No supraclavicular adenopathy.      Left upper body: No supraclavicular adenopathy.   Skin:     General: Skin is warm and dry.   Neurological:      General: No focal deficit present.      Mental Status: She is alert and oriented to person, place, and time.   Psychiatric:         Mood and Affect: Mood normal.          "Behavior: Behavior normal. Behavior is cooperative.         Thought Content: Thought content normal.         Judgment: Judgment normal.         /68   Ht 154.9 cm (61\")   Wt 75.3 kg (166 lb)   LMP 01/01/2024   BMI 31.37 kg/m²     Assessment & Plan   Diagnoses and all orders for this visit:    1. Well woman exam with routine gynecological exam (Primary)    2. Family history of breast cancer    3. Family history of ovarian cancer    4. S/P laparoscopic sleeve gastrectomy    5. Screening for cervical cancer  -     IGP, Apt HPV,rfx 16 / 18,45    6. Menopausal symptoms  -     Follicle Stimulating Hormone  -     Estradiol    7. Encounter for screening mammogram for malignant neoplasm of breast  -     Mammo Screening Digital Tomosynthesis Bilateral With CAD; Future        All questions answered.  Breast self exam technique reviewed and patient encouraged to perform self-exam monthly.  Discussed healthy lifestyle modifications.  Recommended 30 minutes of aerobic exercise five times per week.  Discussed calcium needs to prevent osteoporosis.    -Pap and mammo today  -Pt will schedule her colonoscopy  -Check FSH and estradiol level   -F/u 1 year                "

## 2024-01-05 LAB
ESTRADIOL SERPL-MCNC: 28.1 PG/ML
FSH SERPL-ACNC: 21.6 MIU/ML

## 2024-01-09 LAB
CYTOLOGIST CVX/VAG CYTO: ABNORMAL
CYTOLOGY CVX/VAG DOC CYTO: ABNORMAL
CYTOLOGY CVX/VAG DOC THIN PREP: ABNORMAL
DX ICD CODE: ABNORMAL
DX ICD CODE: ABNORMAL
HIV 1 & 2 AB SER-IMP: ABNORMAL
HPV I/H RISK 4 DNA CVX QL PROBE+SIG AMP: NEGATIVE
OTHER STN SPEC: ABNORMAL
PATHOLOGIST CVX/VAG CYTO: ABNORMAL
RECOM F/U CVX/VAG CYTO: ABNORMAL
STAT OF ADQ CVX/VAG CYTO-IMP: ABNORMAL

## 2024-06-05 ENCOUNTER — OFFICE VISIT (OUTPATIENT)
Dept: BARIATRICS/WEIGHT MGMT | Facility: CLINIC | Age: 54
End: 2024-06-05
Payer: COMMERCIAL

## 2024-06-05 VITALS
TEMPERATURE: 97.8 F | HEIGHT: 61 IN | BODY MASS INDEX: 31.43 KG/M2 | WEIGHT: 166.5 LBS | SYSTOLIC BLOOD PRESSURE: 114 MMHG | HEART RATE: 59 BPM | DIASTOLIC BLOOD PRESSURE: 68 MMHG

## 2024-06-05 DIAGNOSIS — E66.9 OBESITY, CLASS I, BMI 30-34.9: Primary | ICD-10-CM

## 2024-06-05 DIAGNOSIS — G47.33 OSA (OBSTRUCTIVE SLEEP APNEA): ICD-10-CM

## 2024-06-05 DIAGNOSIS — Z98.84 S/P LAPAROSCOPIC SLEEVE GASTRECTOMY: ICD-10-CM

## 2024-06-05 DIAGNOSIS — K21.9 GASTROESOPHAGEAL REFLUX DISEASE WITHOUT ESOPHAGITIS: ICD-10-CM

## 2024-06-05 DIAGNOSIS — R73.03 PREDIABETES: ICD-10-CM

## 2024-06-05 DIAGNOSIS — R53.82 CHRONIC FATIGUE: ICD-10-CM

## 2024-06-05 PROBLEM — E66.3 OVERWEIGHT (BMI 25.0-29.9): Status: RESOLVED | Noted: 2021-12-01 | Resolved: 2024-06-05

## 2024-06-05 RX ORDER — TOPIRAMATE 25 MG/1
TABLET ORAL
Qty: 166 TABLET | Refills: 0 | Status: SHIPPED | OUTPATIENT
Start: 2024-06-05 | End: 2024-09-03

## 2024-06-05 NOTE — PROGRESS NOTES
[facility] BARIATRIC SURGERY  [address]  [facility] [address]  [phone]  [address]  [facility] [id number] [phone]  Dept: [phone]  6/5/2024      Armin Viramontes.  [id number]  [id number]  1970  female      Chief Complaint   Patient presents with    Follow-up     Follow up sleeve       BH Post-Op Bariatric Surgery:   Armin Viramontes is status post Laparoscopic Sleeve procedure, performed on 7/22/20     HPI:     Today's weight is 75.5 kg (166 lb 8 oz) pounds, today's BMI is Body mass index is 31.44 kg/m²., she has a gain of 14 pounds since the last visit and her weight loss since surgery is 99 pounds. The patient reports a decreased portion size and loss of appetite.    Armin Viramontes denies nausea, reflux, heartburn and reports that she did get down to around 145lb and maintained around 150lb for 6-8months or so. She has been more focused on strength in the last few years. She has noticed that she has noticed herself snacking more often. More on veggies straws. She has undergone liposuction and a tummy tuck. She reports dealing with menopausal symptoms. She reports that she feels comfortable around 150lb.     She does report hair loss.      Diet and Exercise: Diet history reviewed and discussed with the patient. Weight loss/gains to date discussed with the patient. The patient states they are eating 70 grams of protein per day. She reports eating 3 meals per day, a typical portion size of 1/2-3-4 cup, eating 3 snacks per day, drinking 5-6 or more 8-oz. glasses of water per day, no carbonated beverage consumption and exercising regularly.     She can tolerate 2-3 oz in a sitting but will go back and finish the second half of hear meal 30-45 minutes later.     She is getting most of her protein from solid foods. She is still being careful to stop when she is feeling full and honors her cues for fullness. She does report  hunger every 2-3 hours throughout the day. If she has something sweet, she will drink sugar free. She avoids sodas. She has gotten more into strength training. She has been focused on trying to cut back on her snacking and grazing. She is currently perimenopausal.     She has previously gone through [facility] and had her body composition taken.     Supplements: OTC MTV with iron and calcium.     Review of Systems   Constitutional:  Positive for appetite change. Negative for fatigue and unexpected weight change.        Hair loss   HENT: Negative.     Eyes: Negative.    Respiratory: Negative.     Cardiovascular: Negative.  Negative for leg swelling.   Gastrointestinal:  Negative for abdominal distention, abdominal pain, constipation, diarrhea, nausea and vomiting.   Genitourinary:  Negative for difficulty urinating, frequency and urgency.   Musculoskeletal:  Negative for back pain.   Skin: Negative.    Psychiatric/Behavioral: Negative.     All other systems reviewed and are negative.      Patient Active Problem List   Diagnosis    Seasonal allergies    Hyperlipidemia    GERD (gastroesophageal reflux disease)    HPV in female    Family history of ovarian cancer    Lymphadenopathy    Iron deficiency anemia due to chronic blood loss    Insomnia    Chronic fatigue    JAZMYN (obstructive sleep apnea)    Multiple joint pain    Prediabetes    SOB (shortness of breath)    Leg swelling    Chest pain, atypical    Gastric polyps    Dietary counseling    S/P laparoscopic sleeve gastrectomy    Obesity, Class I, BMI 30-34.9    Family history of breast cancer       Past Medical History:   Diagnosis Date    Anemia     Arthritis     osteo    Dermatitis     GERD (gastroesophageal reflux disease)     Hair loss     History of obesity     HPV in female 2012    Hyperlipidemia     Infectious mononucleosis     Lymphadenopathy 02/2019    Right axilla    Nausea     Obesity, Class I, BMI 30-34.9 2/5/2021    JAZMYN (obstructive sleep apnea)     CPAP     Patient exposure to body fluids     Plantar fasciitis, bilateral     Seasonal allergies        The following portions of the patient's history were reviewed and updated as appropriate: allergies, current medications, past family history, past medical history, past social history, past surgical history, and problem list.    Vitals:    06/05/24 1436   BP: 114/68   Pulse: 59   Temp: 97.8 °F (36.6 °C)       Physical Exam  Vitals and nursing note reviewed.   Constitutional:       Appearance: She is well-developed.   Neck:      Thyroid: No thyromegaly.   Cardiovascular:      Rate and Rhythm: Normal rate.   Pulmonary:      Effort: Pulmonary effort is normal. No respiratory distress.   Abdominal:      Palpations: Abdomen is soft.   Musculoskeletal:         General: No tenderness.   Skin:     General: Skin is warm and dry.   Neurological:      Mental Status: She is alert.   Psychiatric:         Behavior: Behavior normal.         Assessment:   Post-op, the patient would like to lose more weight but is doing a good job getting protein in. She honors her fullness cues, meal frequency and portion sizes are reasonable. She reports more cravings and lack of satiety in the evenings.     Encounter Diagnoses   Name Primary?    Obesity, Class I, BMI 30-34.9 Yes    Prediabetes     S/P laparoscopic sleeve gastrectomy     Gastroesophageal reflux disease without esophagitis     JAZMYN (obstructive sleep apnea)     Chronic fatigue        Plan:   We did discuss satiety hormones, increasing fiber in her diet, eating to best utilize the sleeve itself. She is not able to eat 4-6oz of meat in a sitting and has to break this into two portions at each meal but does well making sure to get her protein in and to get it from solids. We did discuss use of a GLP-1 medications as well as phentermine or topiramate but hunger is not a primary concern for her as much as satiety is and she would prefer to start with a low dose oral medication. She denies a  history of seizures or migraines and will start this at 25mg nightly for two weeks and then increase to 50mg. Discussed common AE and patient verbalized understanding of importance of not stopping this medication abruptly.   Encouraged patient to be sure to get plenty of lean protein per day through small frequent meals all with a protein source.   Activity restrictions: none.   Recommended patient be sure to get at least 70 grams of protein per day by eating small, frequent meals all with high lean protein choices. Be sure to limit/cut back on daily carbohydrate intake. Discussed with the patient the recommended amount of water per day to intake- half of body weight in ounces. Reviewed vitamin requirements. Be sure to do routine exercise, 150 minutes per week minimum, including both cardio and strength training.     Instructions / Recommendations: dietary counseling recommended, recommended a daily protein intake of  grams, vitamin supplement(s) recommended, recommended exercising at least 150 minutes per week, behavior modifications recommended and instructed to call the office for concerns, questions, or problems.     The patient was instructed to follow up in 3 months .      Total time spent during this encounter today was 45 minutes

## 2024-06-20 ENCOUNTER — TELEPHONE (OUTPATIENT)
Dept: BARIATRICS/WEIGHT MGMT | Facility: CLINIC | Age: 54
End: 2024-06-20
Payer: COMMERCIAL

## 2024-06-20 NOTE — TELEPHONE ENCOUNTER
I spoke to patient and she has been on topomax for 1 week, has started feeling dizzy and not feeling well at all. She was only taking 1 tablet a day. She wants to stop this and I told her it was ok and I did share this with the provider.

## 2024-08-07 ENCOUNTER — OFFICE VISIT (OUTPATIENT)
Dept: BARIATRICS/WEIGHT MGMT | Facility: CLINIC | Age: 54
End: 2024-08-07
Payer: COMMERCIAL

## 2024-08-07 VITALS
HEART RATE: 62 BPM | HEIGHT: 61 IN | TEMPERATURE: 97.6 F | DIASTOLIC BLOOD PRESSURE: 78 MMHG | WEIGHT: 169 LBS | BODY MASS INDEX: 31.91 KG/M2 | SYSTOLIC BLOOD PRESSURE: 118 MMHG

## 2024-08-07 DIAGNOSIS — E66.9 OBESITY, CLASS I, BMI 30-34.9: Primary | ICD-10-CM

## 2024-08-07 DIAGNOSIS — G47.33 OSA (OBSTRUCTIVE SLEEP APNEA): ICD-10-CM

## 2024-08-07 DIAGNOSIS — Z98.84 S/P LAPAROSCOPIC SLEEVE GASTRECTOMY: ICD-10-CM

## 2024-08-07 PROCEDURE — 99215 OFFICE O/P EST HI 40 MIN: CPT | Performed by: NURSE PRACTITIONER

## 2024-08-07 NOTE — PROGRESS NOTES
"[facility]IATRIC SURGERY  [address]  [facility] [id number] [phone]  [address]  [facility] [id number] [phone]  Dept: [phone]  8/7/2024      Armin Viramontes.  [id number]  [id number]  1970  female      Chief Complaint   Patient presents with    Follow-up     Sleeve / rx follow up        BH Post-Op Bariatric Surgery:   Armin Viramontes is status post Laparoscopic Sleeve procedure, performed on 7/22/20 who previously started topiramate to help with cravings and improve satiety two months ago. She reports coming back off of the medication due to lightheadedness and she just felt \"off\".    HPI:     Today's weight is 76.7 kg (169 lb) pounds, today's BMI is Body mass index is 31.95 kg/m²., she has a gain of 3 pounds since the last visit and her weight loss since surgery is 96 pounds. The patient reports a decreased portion size and loss of appetite.    Armin Viramontes denies nausea, vomiting, reflux and reports that she is concerned about weight gain and would like to look into other options for MWL     Diet and Exercise: Diet history reviewed and discussed with the patient. Weight loss/gains to date discussed with the patient. The patient states they are eating 80 grams of protein per day. She reports eating 3-4 meals per day, a typical portion size of 1/2 cup, eating 1-2 snacks per day, drinking 5-6 or more 8-oz. glasses of water per day, no carbonated beverage consumption and exercising regularly- cardio and strength 4-5 days per week    Supplements: whole foods, OTC MTV with iron.     Review of Systems   Constitutional:  Positive for appetite change. Negative for fatigue and unexpected weight change.        Hair thinning   HENT: Negative.     Eyes: Negative.    Respiratory: Negative.     Cardiovascular: Negative.  Negative for leg swelling.   Gastrointestinal:  Negative for abdominal distention, abdominal pain, " constipation, diarrhea, nausea and vomiting.   Genitourinary:  Negative for difficulty urinating, frequency and urgency.   Musculoskeletal:  Negative for back pain.   Skin: Negative.    Psychiatric/Behavioral: Negative.     All other systems reviewed and are negative.      Patient Active Problem List   Diagnosis    Seasonal allergies    Hyperlipidemia    HPV in female    Family history of ovarian cancer    Lymphadenopathy    Iron deficiency anemia due to chronic blood loss    Insomnia    Chronic fatigue    JAZMYN (obstructive sleep apnea)    Multiple joint pain    Prediabetes    SOB (shortness of breath)    Leg swelling    Chest pain, atypical    Gastric polyps    Dietary counseling    S/P laparoscopic sleeve gastrectomy    Obesity, Class I, BMI 30-34.9    Family history of breast cancer       Past Medical History:   Diagnosis Date    Anemia     Arthritis     osteo    Dermatitis     GERD (gastroesophageal reflux disease)     Hair loss     History of obesity     HPV in female 2012    Hyperlipidemia     Infectious mononucleosis     Lymphadenopathy 02/2019    Right axilla    Nausea     Obesity, Class I, BMI 30-34.9 2/5/2021    JAZMYN (obstructive sleep apnea)     CPAP    Patient exposure to body fluids     Plantar fasciitis, bilateral     Seasonal allergies        The following portions of the patient's history were reviewed and updated as appropriate: allergies, current medications, past family history, past medical history, past social history, past surgical history, and problem list.    Vitals:    08/07/24 0826   BP: 118/78   Pulse: 62   Temp: 97.6 °F (36.4 °C)       Physical Exam  Vitals and nursing note reviewed.   Constitutional:       Appearance: She is well-developed.   Neck:      Thyroid: No thyromegaly.   Cardiovascular:      Rate and Rhythm: Normal rate.   Pulmonary:      Effort: Pulmonary effort is normal. No respiratory distress.   Abdominal:      Palpations: Abdomen is soft.   Musculoskeletal:         General:  No tenderness.   Skin:     General: Skin is warm and dry.   Neurological:      Mental Status: She is alert.   Psychiatric:         Behavior: Behavior normal.         Assessment:   Post-op, the patient would like to discuss other options for MWL medications. Her insurance does not over medications for weight loss.     Encounter Diagnoses   Name Primary?    Obesity, Class I, BMI 30-34.9 Yes    S/P laparoscopic sleeve gastrectomy     JAZMYN (obstructive sleep apnea)        Plan:   We did discuss GLP-1 therapy, specifically semaglutide. We discussed dosing, administration including injection site preparation with alcohol, titration, common side effects including nausea, vomiting, constipation, diarrhea, hypoglycemia, injection site reaction, headache, and abdominal pain. We discussed contraindications including pregnancy. Patient denies history or family history of MEN2 or thyroid cancer, or history of pancreatitis. her does not take insulin or a sulfonylurea. We did discuss remote chance of renal impairment as it was associated with GI symptoms in trials.     Patient will start taking semaglutide at the 0.25mg dose subcutaneously. Armin Viramontes verbalized understanding that her will take one dose weekly and will repeat this dosing for 1 month. We discussed storing future doses in the refrigerator and her was advised to take a dose if missed ASAP if the next scheduled dose is greater than 48 hours away.     Patient both watched, and was able to repeat demonstration of administration using medication including site preparation, administration, and disposal. her will call and report any adverse effects to our group in the instance that any occur and we will advise at that time. Armin Viramontes was given handouts regarding medication risks, common AE, and contraindications.    Patient was also provided education on how to store, clean vial and injection site, draw up and administer a subcutaneous injection. @HIS@ was  provided a web link to a video to follow when administering her medication as well as a dosing guide with @HIS@ specific dose for the first four weeks.     Encouraged patient to be sure to get plenty of lean protein per day through small frequent meals all with a protein source.   Activity restrictions: none.   Recommended patient be sure to get at least 70 grams of protein per day by eating small, frequent meals all with high lean protein choices. Be sure to limit/cut back on daily carbohydrate intake. Discussed with the patient the recommended amount of water per day to intake- half of body weight in ounces. Reviewed vitamin requirements. Be sure to do routine exercise, 150 minutes per week minimum, including both cardio and strength training.     Instructions / Recommendations: dietary counseling recommended, recommended a daily protein intake of  grams, vitamin supplement(s) recommended, recommended exercising at least 150 minutes per week, behavior modifications recommended and instructed to call the office for concerns, questions, or problems.     The patient was instructed to follow up in 1 month .    Total time spent during this encounter today was 45 minutes

## 2024-09-04 RX ORDER — TOPIRAMATE 25 MG/1
TABLET, FILM COATED ORAL
Qty: 166 TABLET | OUTPATIENT
Start: 2024-09-04

## 2024-09-05 ENCOUNTER — OFFICE VISIT (OUTPATIENT)
Dept: BARIATRICS/WEIGHT MGMT | Facility: CLINIC | Age: 54
End: 2024-09-05
Payer: COMMERCIAL

## 2024-09-05 VITALS
HEIGHT: 61 IN | DIASTOLIC BLOOD PRESSURE: 53 MMHG | HEART RATE: 59 BPM | TEMPERATURE: 97.8 F | BODY MASS INDEX: 30.96 KG/M2 | WEIGHT: 164 LBS | SYSTOLIC BLOOD PRESSURE: 118 MMHG

## 2024-09-05 DIAGNOSIS — Z98.84 S/P LAPAROSCOPIC SLEEVE GASTRECTOMY: ICD-10-CM

## 2024-09-05 DIAGNOSIS — R53.82 CHRONIC FATIGUE: ICD-10-CM

## 2024-09-05 DIAGNOSIS — R73.03 PREDIABETES: ICD-10-CM

## 2024-09-05 DIAGNOSIS — G47.33 OSA (OBSTRUCTIVE SLEEP APNEA): ICD-10-CM

## 2024-09-05 DIAGNOSIS — E66.9 OBESITY, CLASS I, BMI 30-34.9: Primary | ICD-10-CM

## 2024-09-05 PROCEDURE — 99213 OFFICE O/P EST LOW 20 MIN: CPT | Performed by: NURSE PRACTITIONER

## 2024-09-05 NOTE — PROGRESS NOTES
[facility] BARIATRIC SURGERY  [address]  [facility] [address]  [phone]  [address]  [facility] [id number] [phone]  Dept: [phone]  9/5/2024      Armin Viramontes.  [id number]  [id number]  1970  female      Chief Complaint   Patient presents with    Follow-up     Follow up sleeve        BH Post-Op Bariatric Surgery:   Armin Viramontes is status post Laparoscopic Sleeve procedure, performed on 7/22/20 who has been taking compounded semaglutide starting on 8/7/24 for the last randy at the 0.25mg dose. She does note more fatigue than typical intermittently by Friday each week but reports that this didn't last long. She does report mild nausea but she reports that she has only had this intermittently.     HPI:     Today's weight is 74.4 kg (164 lb) pounds, today's BMI is Body mass index is 31 kg/m²., she has a loss of 5 pounds since the last visit and her weight loss since surgery is 101 pounds. The patient reports a decreased portion size and loss of appetite.    Armin Viramontes denies headaches, dizziness, reflux. She does report intermittent nausea and fatigue as well as mild constipation. She denies head hunger. She hasn't noticed a big change in portion sizes.      Diet and Exercise: Diet history reviewed and discussed with the patient. Weight loss/gains to date discussed with the patient. The patient states they are eating 50 grams of protein per day. She reports eating 2-3 meals per day, a typical portion size of 1/2 cup, eating 1-2 snacks per day, drinking 5-6 or more 8-oz. glasses of water per day, no carbonated beverage consumption and exercising regularly- cardio and strength training 5-6 days per week    Supplements: OTC MTV with iron and calcium    Review of Systems   Constitutional:  Positive for appetite change. Negative for fatigue and unexpected weight change.   HENT: Negative.     Eyes: Negative.     Respiratory: Negative.     Cardiovascular: Negative.  Negative for leg swelling.   Gastrointestinal:  Negative for abdominal distention, abdominal pain, constipation, diarrhea, nausea and vomiting.   Genitourinary:  Negative for difficulty urinating, frequency and urgency.   Musculoskeletal:  Negative for back pain.   Skin: Negative.    Psychiatric/Behavioral: Negative.     All other systems reviewed and are negative.      Patient Active Problem List   Diagnosis    Seasonal allergies    Hyperlipidemia    HPV in female    Family history of ovarian cancer    Lymphadenopathy    Iron deficiency anemia due to chronic blood loss    Insomnia    Chronic fatigue    JAZMYN (obstructive sleep apnea)    Multiple joint pain    Prediabetes    SOB (shortness of breath)    Leg swelling    Chest pain, atypical    Gastric polyps    Dietary counseling    S/P laparoscopic sleeve gastrectomy    Obesity, Class I, BMI 30-34.9    Family history of breast cancer       Past Medical History:   Diagnosis Date    Anemia     Arthritis     osteo    Dermatitis     GERD (gastroesophageal reflux disease)     Hair loss     History of obesity     HPV in female 2012    Hyperlipidemia     Infectious mononucleosis     Lymphadenopathy 02/2019    Right axilla    Nausea     Obesity, Class I, BMI 30-34.9 2/5/2021    JAZMYN (obstructive sleep apnea)     CPAP    Patient exposure to body fluids     Plantar fasciitis, bilateral     Seasonal allergies        The following portions of the patient's history were reviewed and updated as appropriate: allergies, current medications, past family history, past medical history, past social history, past surgical history, and problem list.    Vitals:    09/05/24 1441   BP: 118/53   Pulse: 59   Temp: 97.8 °F (36.6 °C)       Physical Exam  Vitals and nursing note reviewed.   Constitutional:       Appearance: She is well-developed.   Neck:      Thyroid: No thyromegaly.   Cardiovascular:      Rate and Rhythm: Normal rate.    Pulmonary:      Effort: Pulmonary effort is normal. No respiratory distress.   Abdominal:      Palpations: Abdomen is soft.   Musculoskeletal:         General: No tenderness.   Skin:     General: Skin is warm and dry.   Neurological:      Mental Status: She is alert.   Psychiatric:         Behavior: Behavior normal.         Assessment:   Post-op, the patient is doing well.     Encounter Diagnoses   Name Primary?    Obesity, Class I, BMI 30-34.9 Yes    S/P laparoscopic sleeve gastrectomy     JAZMYN (obstructive sleep apnea)     Chronic fatigue     Prediabetes        Plan:   Continue semaglutide at 0.25mg weekly. Increase protein intake.  Encouraged patient to be sure to get plenty of lean protein per day through small frequent meals all with a protein source.   Activity restrictions: none.   Recommended patient be sure to get at least 70 grams of protein per day by eating small, frequent meals all with high lean protein choices. Be sure to limit/cut back on daily carbohydrate intake. Discussed with the patient the recommended amount of water per day to intake- half of body weight in ounces. Reviewed vitamin requirements. Be sure to do routine exercise, 150 minutes per week minimum, including both cardio and strength training.     Instructions / Recommendations: dietary counseling recommended, recommended a daily protein intake of  grams, vitamin supplement(s) recommended, recommended exercising at least 150 minutes per week, behavior modifications recommended and instructed to call the office for concerns, questions, or problems.     The patient was instructed to follow up in 3 months .     Total time spent during this encounter today was 25 minutes

## 2025-01-08 ENCOUNTER — TELEPHONE (OUTPATIENT)
Dept: BARIATRICS/WEIGHT MGMT | Facility: CLINIC | Age: 55
End: 2025-01-08
Payer: COMMERCIAL

## 2025-01-08 NOTE — TELEPHONE ENCOUNTER
Staff message from Michelle Chin APRN Wethington, Deanna, MA; Joelle Romo MA; Yonis Matute MA  Refill request off the fax for semaglutide. Patient was supposed to have follow up in December. Will send 1 month but please reach out to her to make follow up.      LVM for patient to call us back

## 2025-08-22 ENCOUNTER — HOSPITAL ENCOUNTER (OUTPATIENT)
Dept: CARDIOLOGY | Facility: HOSPITAL | Age: 55
Discharge: HOME | End: 2025-08-22
Admitting: NURSE PRACTITIONER
Payer: COMMERCIAL

## 2025-08-22 ENCOUNTER — OFFICE VISIT (OUTPATIENT)
Dept: BARIATRICS/WEIGHT MGMT | Facility: CLINIC | Age: 55
End: 2025-08-22
Payer: COMMERCIAL

## 2025-08-22 VITALS
OXYGEN SATURATION: 100 % | HEIGHT: 61 IN | HEART RATE: 53 BPM | WEIGHT: 166 LBS | DIASTOLIC BLOOD PRESSURE: 93 MMHG | SYSTOLIC BLOOD PRESSURE: 144 MMHG | TEMPERATURE: 97.7 F | BODY MASS INDEX: 31.34 KG/M2

## 2025-08-22 DIAGNOSIS — Z98.84 S/P LAPAROSCOPIC SLEEVE GASTRECTOMY: ICD-10-CM

## 2025-08-22 DIAGNOSIS — E66.811 OBESITY, CLASS I, BMI 30-34.9: Primary | ICD-10-CM

## 2025-08-22 DIAGNOSIS — R73.03 PREDIABETES: ICD-10-CM

## 2025-08-22 DIAGNOSIS — G47.33 OSA (OBSTRUCTIVE SLEEP APNEA): ICD-10-CM

## 2025-08-22 DIAGNOSIS — E66.811 OBESITY, CLASS I, BMI 30-34.9: ICD-10-CM

## 2025-08-22 PROCEDURE — 93005 ELECTROCARDIOGRAM TRACING: CPT | Performed by: NURSE PRACTITIONER

## 2025-08-22 PROCEDURE — 99214 OFFICE O/P EST MOD 30 MIN: CPT | Performed by: NURSE PRACTITIONER

## 2025-08-22 RX ORDER — PHENTERMINE HYDROCHLORIDE 37.5 MG/1
37.5 TABLET ORAL
Qty: 30 TABLET | Refills: 2 | Status: SHIPPED | OUTPATIENT
Start: 2025-08-22

## 2025-08-25 LAB
QT INTERVAL: 408 MS
QTC INTERVAL: 387 MS

## (undated) DEVICE — ADAPT CLN BIOGUARD AIR/H2O DISP

## (undated) DEVICE — CATH DIAG IMPULSE FR4 5F 100CM

## (undated) DEVICE — BITEBLOCK OMNI BLOC

## (undated) DEVICE — PK CATH CARD 40

## (undated) DEVICE — GLV SURG BIOGEL LTX PF 8 1/2

## (undated) DEVICE — SENSR O2 OXIMAX FNGR A/ 18IN NONSTR

## (undated) DEVICE — MSK PROC CURAPLEX O2 2/ADAPT 7FT

## (undated) DEVICE — TUBING, SUCTION, 1/4" X 10', STRAIGHT: Brand: MEDLINE

## (undated) DEVICE — CATH VENT MIV RADL PIG ST TIP 5F 110CM

## (undated) DEVICE — APL DUPLOSPRAYER MIS 40CM

## (undated) DEVICE — DUAL LUMEN STOMACH TUBE,ANTI-REFLUX VALVE: Brand: SALEM SUMP

## (undated) DEVICE — ENSEAL LAPAROSCOPIC TISSUE SEALER G2 ARTICULATING CURVED JAW FOR USE WITH G2 GENERATOR 5MM DIAMETER 45CM SHAFT LENGTH: Brand: ENSEAL

## (undated) DEVICE — VIOLET BRAIDED (POLYGLACTIN 910), SYNTHETIC ABSORBABLE SUTURE: Brand: COATED VICRYL

## (undated) DEVICE — GLV SURG SENSICARE PI MIC PF SZ8.5 LF STRL

## (undated) DEVICE — ECHELON FLEX POWERED PLUS LONG ARTICULATING ENDOSCOPIC LINEAR CUTTER, 60MM: Brand: ECHELON FLEX

## (undated) DEVICE — ESOPHAGEAL BALLOON DILATATION CATHETER: Brand: CRE FIXED WIRE

## (undated) DEVICE — GLIDESHEATH BASIC HYDROPHILIC COATED INTRODUCER SHEATH: Brand: GLIDESHEATH

## (undated) DEVICE — SYRINGE,TOOMEY,IRRIGATION,70CC,STERILE: Brand: MEDLINE

## (undated) DEVICE — GW EMR FIX EXCHG J STD .035 3MM 260CM

## (undated) DEVICE — VISUALIZATION SYSTEM: Brand: CLEARIFY

## (undated) DEVICE — ENDOPATH XCEL BLADELESS TROCARS WITH STABILITY SLEEVES: Brand: ENDOPATH XCEL

## (undated) DEVICE — KT ORCA ORCAPOD DISP STRL

## (undated) DEVICE — KT MANIFLD CARDIAC

## (undated) DEVICE — LN SMPL CO2 SHTRM SD STREAM W/M LUER

## (undated) DEVICE — KTTNER ENDO BLNT DISSCT

## (undated) DEVICE — GLIDESHEATH SLENDER STAINLESS STEEL KIT: Brand: GLIDESHEATH SLENDER

## (undated) DEVICE — FRCP BX RADJAW4 NDL 2.8 240CM LG OG BX40

## (undated) DEVICE — MARKR SKIN W/RULR AND LBL

## (undated) DEVICE — PK OSC LAP SLV 40

## (undated) DEVICE — CATH DIAG IMPULSE FL3.5 5F 100CM

## (undated) DEVICE — BALN PRESS WEDGE 5F 110CM

## (undated) DEVICE — LAPAROSCOPIC SMOKE FILTRATION SYSTEM: Brand: PALL LAPAROSHIELD® PLUS LAPAROSCOPIC SMOKE FILTRATION SYSTEM

## (undated) DEVICE — DEV INFL ALLIANCE2 SYS